# Patient Record
Sex: MALE | Race: WHITE | Employment: FULL TIME | URBAN - METROPOLITAN AREA
[De-identification: names, ages, dates, MRNs, and addresses within clinical notes are randomized per-mention and may not be internally consistent; named-entity substitution may affect disease eponyms.]

---

## 2018-12-01 ENCOUNTER — HOSPITAL ENCOUNTER (OUTPATIENT)
Dept: CT IMAGING | Age: 53
Discharge: HOME | End: 2018-12-01
Payer: COMMERCIAL

## 2018-12-01 DIAGNOSIS — Q45.3 ECTOPIC PANCREATIC TISSUE IN STOMACH: ICD-10-CM

## 2018-12-01 PROCEDURE — 74170 CT ABD WO CNTRST FLWD CNTRST: CPT

## 2018-12-01 PROCEDURE — 6360000004 HC RX CONTRAST MEDICATION: Performed by: INTERNAL MEDICINE

## 2018-12-01 RX ADMIN — IOPAMIDOL 75 ML: 755 INJECTION, SOLUTION INTRAVENOUS at 11:00

## 2018-12-12 ENCOUNTER — HOSPITAL ENCOUNTER (OUTPATIENT)
Dept: NON INVASIVE DIAGNOSTICS | Age: 53
Discharge: HOME | End: 2018-12-12
Payer: COMMERCIAL

## 2018-12-12 ENCOUNTER — HOSPITAL ENCOUNTER (OUTPATIENT)
Dept: VASCULAR LAB | Age: 53
Discharge: HOME | End: 2018-12-12
Payer: COMMERCIAL

## 2018-12-12 DIAGNOSIS — I65.29 STENOSIS OF CAROTID ARTERY, UNSPECIFIED LATERALITY: Primary | ICD-10-CM

## 2018-12-12 LAB
LEFT VENTRICULAR EJECTION FRACTION HIGH VALUE: 60 %
LEFT VENTRICULAR EJECTION FRACTION MODE: NORMAL
LV EF: 60 %
LVEF MODALITY: NORMAL

## 2018-12-12 PROCEDURE — 93306 TTE W/DOPPLER COMPLETE: CPT

## 2018-12-12 PROCEDURE — 93880 EXTRACRANIAL BILAT STUDY: CPT

## 2021-03-30 ENCOUNTER — IMMUNIZATION (OUTPATIENT)
Dept: FAMILY MEDICINE CLINIC | Age: 56
End: 2021-03-30
Payer: COMMERCIAL

## 2021-03-30 PROCEDURE — 91300 COVID-19, PFIZER VACCINE 30MCG/0.3ML DOSE: CPT | Performed by: NURSE PRACTITIONER

## 2021-03-30 PROCEDURE — 0001A COVID-19, PFIZER VACCINE 30MCG/0.3ML DOSE: CPT | Performed by: NURSE PRACTITIONER

## 2021-04-26 ENCOUNTER — TELEPHONE (OUTPATIENT)
Dept: FAMILY MEDICINE CLINIC | Age: 56
End: 2021-04-26

## 2021-05-06 ENCOUNTER — TELEPHONE (OUTPATIENT)
Dept: FAMILY MEDICINE CLINIC | Age: 56
End: 2021-05-06

## 2021-05-06 NOTE — TELEPHONE ENCOUNTER
Patient got vaccine somewhere else yesterday, needs no appointment    ----- Message from Joesph Lim sent at 2021  1:26 PM EDT -----  Regardinnd vaccine  Patient canceled his 2nd vaccine , the location left a  stating he can get scheduled for May 1st . [facility] cant reschedule 2nd vaccines . Please schedule patient and call [phone].

## 2021-10-25 ENCOUNTER — TELEPHONE (OUTPATIENT)
Dept: CARDIOLOGY CLINIC | Age: 56
End: 2021-10-25

## 2021-10-25 NOTE — TELEPHONE ENCOUNTER
Spoke with Eulogio Aburto. She stated patient was referred to us in 2018. Patient had echo in 2018 but was never seen in office. Recent EKG in her office shows incomplete RBBB. Patient has no cardiac symptoms. He is having a right meniscus repair on 11/4/21. She would for [facility] to review EKG and call her back. EKG being faxed to us.

## 2022-03-03 ENCOUNTER — TELEPHONE (OUTPATIENT)
Dept: CARDIOLOGY CLINIC | Age: 57
End: 2022-03-03

## 2022-03-03 NOTE — TELEPHONE ENCOUNTER
Called the pt to get him scheduled as [employer] with DCE, pt stated he was at work and wants a call back after 3 pm,   Time and Date for the pt will be 3/24 at 9 am with GERARDO. Paper work is on [place].       Pls advise thank you

## 2022-03-21 NOTE — PROGRESS NOTES
[facility]  H+P  Consult  OP Visit  FU Visit   CC HX HPI   GEN  Doing well. No new concerns. AS  Ø CP, SOB. HTN  Ambulatory BP in good range. Ø HA/dizziness. CHOL  Last lipid reviewed. On max dose/tolerated statin. MED  Compliant with CV meds. Ø reported SA. HISTORY/ALLERGY/ROS   MEDHx  has a past medical history of Hypertension. SURGHx  has a past surgical history that includes knee surgery (Right). SOCHx  reports that he has been smoking cigarettes. He has been smoking about 1.00 pack per day. He has never used smokeless tobacco. He reports that he does not use drugs. FAMHx family history is not on file. ALLERG Patient has no known allergies. ROS Full ROS obtained and negative except as mentioned in HPI   MEDICATIONS   Current Outpatient Medications   Medication Sig Dispense Refill    aspirin 81 MG EC tablet Take 81 mg by mouth daily      lisinopril (PRINIVIL;ZESTRIL) 20 MG tablet Take 20 mg by mouth daily      pitavastatin (LIVALO) 4 MG TABS tablet Take 4 mg by mouth daily      omeprazole (PRILOSEC) 40 MG delayed release capsule Take 40 mg by mouth daily      tadalafil (CIALIS) 5 MG tablet Take 5 mg by mouth daily      bisoprolol-hydroCHLOROthiazide (ZIAC) 10-6.25 MG per tablet Take 1 tablet by mouth daily      ezetimibe (ZETIA) 10 MG tablet Take 10 mg by mouth daily      colesevelam (WELCHOL) 625 MG tablet TAKE 3 TABLETS BY MOUTH TWICE DAILY FOR 90 DAYS       No current facility-administered medications for this visit.       PHYSICAL EXAM   Vitals Vitals:    03/24/22 0900   BP: 138/82   Pulse: 64   SpO2: 96%      Gen Alert, coop, no distress Heart  Rrr, 2/6   Head NC, AT, no abnorm Abd  Soft, NT, +BS, no mass, no OM   Eyes PER, conj/corn clear Ext  Ext nl, AT, no C/C/E   Nose Nares nl, no drain, NT Pulse 2+ and symmetric   Throat Lips, mucosa, tongue nl Skin Col/text/turg nl, no vis rash/les   Neck S/S, TM, NT, no bruit/JVD Psych Nl mood and affect   Lung CTA-B, unlabored, no DTP Lymph   No cervical or axillary LA   Ch wall NT, no deform Neuro  Nl gross M/S exam      CODING   SCI (81788) - AS, Asc AA  30-39 minutes preparing to see pt including review hx, tests, consults, perf exam, , educating pt, fam, caregiver, ordering meds/tests/procedures, referring and comm with pcps and other consultants, documenting info in EMR, interpreting results and communicating to fam and coordination of pt care. ASSESSMENT AND PLAN   *AS    Date EF Detail   Sx     No concerning   DATA   Most recent TTE, [facility] reviewed personally   TTE 12/18  3/22 60%  55% Mild AS MG 17, aortic root 3.9 cm, ascending aorta 4.7  Mod AS MG 20, ascending aorta 4.0   Plan     AS moderate by echo  Repeat echo 1 year, sooner with symptom change   *Ascending aortic aneurysm  Status stable  CTC none  TTE As above  Repair Sx, rupture, ED>5.5, AV surgery >4.5  Surv 3.5-4.5: CT Q12m /// 4.5-5.4: CT Q6m  Plan Aggressive BP control, education regarding s/s of complications, interval imaging as above   Noncon CT chest  *COMPLIANCE  Status Compliant  Plan Discussed importance of compliance with meds/diet/salt/exercise; avoid tob/alc/drugs; patient verbalized understanding  *FOLLOWUP  12 months if CT ok    [address] Radha Smith, am scribing for and in the presence of Karlene Reich MD.   SignedRadha 03/21/22 1:20 PM   Provider Carol Ann Dia is working as a scribe for and in the presence of me (Karlene Reich MD). Working as a scribe, Radha Oewns may have prepopulated components of this note with my historical  intellectual property under my direct supervision. Any additions to this intellectual property were performed in my presence and at my direction.   Furthermore, the content and accuracy of this note have been reviewed by me Karlene Reich MD).  3/24/2022 7:18 AM

## 2022-03-23 ENCOUNTER — HOSPITAL ENCOUNTER (OUTPATIENT)
Dept: NON INVASIVE DIAGNOSTICS | Age: 57
Discharge: HOME | End: 2022-03-23
Payer: COMMERCIAL

## 2022-03-23 DIAGNOSIS — I35.0 NODULAR CALCIFIC AORTIC VALVE STENOSIS: ICD-10-CM

## 2022-03-23 DIAGNOSIS — I10 BENIGN HYPERTENSION: ICD-10-CM

## 2022-03-23 DIAGNOSIS — I51.7 CARDIOMEGALY: ICD-10-CM

## 2022-03-23 LAB
LV EF: 55 %
LVEF MODALITY: NORMAL

## 2022-03-23 PROCEDURE — 93306 TTE W/DOPPLER COMPLETE: CPT

## 2022-03-24 ENCOUNTER — OFFICE VISIT (OUTPATIENT)
Dept: CARDIOLOGY CLINIC | Age: 57
End: 2022-03-24
Payer: COMMERCIAL

## 2022-03-24 VITALS
HEIGHT: 69 IN | DIASTOLIC BLOOD PRESSURE: 82 MMHG | BODY MASS INDEX: 39.31 KG/M2 | OXYGEN SATURATION: 96 % | SYSTOLIC BLOOD PRESSURE: 138 MMHG | WEIGHT: 265.4 LBS | HEART RATE: 64 BPM

## 2022-03-24 DIAGNOSIS — I35.0 AORTIC VALVE STENOSIS, ETIOLOGY OF CARDIAC VALVE DISEASE UNSPECIFIED: ICD-10-CM

## 2022-03-24 DIAGNOSIS — I77.810 ASCENDING AORTA DILATATION (HCC): Primary | ICD-10-CM

## 2022-03-24 PROCEDURE — 99214 OFFICE O/P EST MOD 30 MIN: CPT | Performed by: INTERNAL MEDICINE

## 2022-03-24 RX ORDER — BISOPROLOL FUMARATE AND HYDROCHLOROTHIAZIDE 10; 6.25 MG/1; MG/1
1 TABLET ORAL DAILY
COMMUNITY
Start: 2021-05-11

## 2022-03-24 RX ORDER — ASPIRIN 81 MG/1
81 TABLET ORAL DAILY
COMMUNITY

## 2022-03-24 RX ORDER — TADALAFIL 5 MG/1
5 TABLET ORAL DAILY
COMMUNITY

## 2022-03-24 RX ORDER — PITAVASTATIN CALCIUM 4.18 MG/1
4 TABLET, FILM COATED ORAL DAILY
COMMUNITY
Start: 2021-05-11

## 2022-03-24 RX ORDER — EZETIMIBE 10 MG/1
10 TABLET ORAL DAILY
COMMUNITY
Start: 2021-05-11

## 2022-03-24 RX ORDER — OMEPRAZOLE 40 MG/1
40 CAPSULE, DELAYED RELEASE ORAL DAILY
COMMUNITY
Start: 2021-05-11

## 2022-03-24 RX ORDER — COLESEVELAM 180 1/1
TABLET ORAL
COMMUNITY
Start: 2021-06-07

## 2022-03-24 RX ORDER — LISINOPRIL 20 MG/1
20 TABLET ORAL DAILY
COMMUNITY

## 2022-04-06 ENCOUNTER — HOSPITAL ENCOUNTER (OUTPATIENT)
Dept: CT IMAGING | Age: 57
Discharge: HOME | End: 2022-04-06
Payer: COMMERCIAL

## 2022-04-06 DIAGNOSIS — I77.810 ASCENDING AORTA DILATATION (HCC): ICD-10-CM

## 2022-04-06 PROCEDURE — 71250 CT THORAX DX C-: CPT

## 2022-04-07 DIAGNOSIS — I77.810 ASCENDING AORTA DILATATION (HCC): Primary | ICD-10-CM

## 2022-04-15 ENCOUNTER — CLINICAL DOCUMENTATION (OUTPATIENT)
Dept: OTHER | Age: 57
End: 2022-04-15

## 2022-04-28 ENCOUNTER — OFFICE VISIT (OUTPATIENT)
Dept: CARDIOTHORACIC SURGERY | Age: 57
End: 2022-04-28
Payer: COMMERCIAL

## 2022-04-28 VITALS
HEIGHT: 69 IN | TEMPERATURE: 98.1 F | DIASTOLIC BLOOD PRESSURE: 72 MMHG | WEIGHT: 260.4 LBS | BODY MASS INDEX: 38.57 KG/M2 | HEART RATE: 62 BPM | OXYGEN SATURATION: 98 % | SYSTOLIC BLOOD PRESSURE: 110 MMHG

## 2022-04-28 DIAGNOSIS — I71.21 ASCENDING AORTIC ANEURYSM: Primary | ICD-10-CM

## 2022-04-28 DIAGNOSIS — I35.0 NONRHEUMATIC AORTIC VALVE STENOSIS: ICD-10-CM

## 2022-04-28 PROCEDURE — 99204 OFFICE O/P NEW MOD 45 MIN: CPT | Performed by: THORACIC SURGERY (CARDIOTHORACIC VASCULAR SURGERY)

## 2022-04-28 NOTE — PROGRESS NOTES
Department of Cardiovascular & Thoracic Surgery  History and Physical / Consultation          PCP: Rebekah Schneider DO    Referring Physician: Dr. Rochelle Archer    DIAGNOSIS:  Ascending Aortic Aneurysm     CHIEF COMPLAINT:  5.2 cm Ascending Aortic Aneurysm on CT scan    History Obtained From:  patient, electronic medical record    HISTORY OF PRESENT ILLNESS:      The patient is a 61 y.o. male with a hx of Moderate Aortic Stenosis, HTN, HLD, pancreatitis, fatty liver, and GERD who is referred for an ascending aortic aneurysm measuring 5.2cm in size seen on a CT Chest W/O contrast on 4/6/22. Patient also had an echo preformed on 3/23/22 showing mild to moderate aortic stenosis. Patient presents today denying any symptoms of chest pain, shortness of breath, palpitations, lightheadedness, or leg swelling. Patient has a 60 pack year history of smoking and is currently smoking 1 pack a day.       Past Medical History:        Diagnosis Date    Fatty liver     Hyperlipidemia     Hypertension     Pancreatitis        Past Surgical History:        Procedure Laterality Date    KNEE SURGERY Right 11/2021    meniscus repair       Medications:   Current Outpatient Medications   Medication Sig Dispense Refill    Cholecalciferol (VITAMIN D3) 125 MCG (5000 UT) TABS Take 5,000 Units by mouth      aspirin 81 MG EC tablet Take 81 mg by mouth daily      lisinopril (PRINIVIL;ZESTRIL) 20 MG tablet Take 20 mg by mouth daily      pitavastatin (LIVALO) 4 MG TABS tablet Take 4 mg by mouth daily      omeprazole (PRILOSEC) 40 MG delayed release capsule Take 40 mg by mouth daily      tadalafil (CIALIS) 5 MG tablet Take 5 mg by mouth daily      bisoprolol-hydroCHLOROthiazide (ZIAC) 10-6.25 MG per tablet Take 1 tablet by mouth daily      ezetimibe (ZETIA) 10 MG tablet Take 10 mg by mouth daily      colesevelam (WELCHOL) 625 MG tablet TAKE 3 TABLETS BY MOUTH TWICE DAILY FOR 90 DAYS       No current facility-administered medications for this visit. Allergies:  Patient has no known allergies. Social History:    TOBACCO: Reports smoking for 45 years up to 2 packs a day. Patient has decreased his smoking to 1 pack per day. Estimated 60 pack year smoking history. ETOH:  Currently drinks 12-15 beers on saturdays. Patient reports having previously been drinking 12-15 beers everyday but decreased his amount after having pancreatitis. DRUGS:   reports no history of drug use. LIFESTYLE: Active    MARITAL STATUS:    OCCUPATION:  Employed at a [employer]     Family History:        Problem Relation Age of Onset    Heart Attack Father        REVIEW OF SYSTEMS:    Constitutional:  No night sweats, headaches, weight loss. Eyes:  No glaucoma, cataracts. ENMT:  No nosebleeds, deviated septum. Cardiac:  No arrhythmias. Vascular:  No claudication, varicosities. + HTN, aneurysm  GI:  No PUD, + heartburn. :  No kidney stones, frequent UTIs  Musculoskeletal:  No arthritis, gout. Respiratory:  No SOB, emphysema, asthma. Integumentary:  No dermatitis, itching, rash. Neurological:  No stroke, TIAs, seizures. Psychiatric:  No depression, anxiety. Endocrine: No diabetes, thyroid issues. Hematologic:  No bleeding, easy bruising. Immunologic:  No known cancer, steroid therapies.     PHYSICAL EXAM:    VITALS:  /72 (Site: Right Upper Arm, Position: Sitting, Cuff Size: Medium Adult)   Pulse 62   Temp 98.1 °F (36.7 °C) (Temporal)   Ht 5' 9\" (1.753 m)   Wt 260 lb 6.4 oz (118.1 kg)   SpO2 98%   BMI 38.45 kg/m²     Eyes:  lids and lashes normal, pupils equal and round, extra ocular muscles intact, sclera clear, conjunctiva normal    Head/ENT:  Normocephalic, atraumatic,  residual dentition, normal gums, & palate, oropharynx without erythema or exudates    Neck:  supple, symmetrical, trachea midline, no lymphadenopathy, no jugular venous distension, no carotid bruits and MASSES:  no masses    Lungs:  no increased work of breathing, good air exchange, no retractions and clear to auscultation, no palpable / percussible abnormalities    Cardiovascular:  regular rate and rhythm, S1, S2 normal, no murmur, click, rub or gallop    Pulses:  Right dorsalis pedis 2, Left dorsalis pedis 2, Right posterior tibial 2, Left Posterior tibial 2, Right radial 2, and Left radial 2    Abdomen:  Soft, normal bowel sounds, non-tender, no hepatosplenomegaly, aorta likely normal and bruits absent    Musculoskeletal:  Back is straight and non-tender,  No CVAT, full ROM of upper and lower extremities. Extremities:   No clubbing, or cyanosis, or edema     Skin: warm and normal turgor, no ulcers, infections, or rashes, no rashes    Neurological: awake, alert and oriented x 3, motor 5/5 bilateral upper and lower extremities, sensation grossly intact    Psychiatric: Mood and affect appear appropriate    DATA:    CT Chest W/O Contrast: 4/6/22  Impression:  1. No acute intrapulmonary findings. 2. 5.2 cm fusiform aneurysm of the ascending aorta, without evidence of leak  or rupture. ECHO: 2/23/22   Summary   Normal left ventricle size, and systolic function with an estimated ejection   fraction of 55%. Diastolic filling parameters suggests grade I diastolic dysfunction . There is mild concentric left ventricular hypertrophy. Mild to moderate aortic stenosis with a peak velocity of 3.1m/s and a mean   pressure gradient of 20mmHg. MACRINA 1.78 cm2   Calcification of the anterior mitral valve leaflet. ASSESSMENT AND PLAN:    Mr. Cristela Dailey is a 61year old male being seen in the office today for an ascending aortic aneurysm measuring 5.2cm in size seen on a CT Chest W/O contrast on 4/6/22 and mild to moderate aortic stenosis seen on an echo on 3/23/22. The patient's ascending aortic aneurysm does not meet criteria for surgical intervention until it is 5.5cm in size.   His aortic stenosis also does not meet criteria for surgical intervention until it becomes severe. This was discussed with the patient and he will need to have a surveillance CT Chest w/o contrast in 6 months (approximately on 10/6/22) to continue to monitor his aneurysm. If aneurysm reaches or exceeds 5.5cm or aortic stenosis becomes severe, patient can be seen again in the office to discuss surgical repair. Discussed assessment and plan with Dr. Lizabeth Lozano. Patient is still currently smoking 1 pack of cigarettes a day and drinking 12-15 beers a week. Smoking and alcohol cessation counseling was given by Dr. Lizabeth Lozano for 20 minutes. All questions answered at this time.        Electronically signed by Elisabeth Grande PA-C on 4/28/2022 at 3:08 PM

## 2022-05-25 ENCOUNTER — TELEPHONE (OUTPATIENT)
Dept: CARDIOLOGY CLINIC | Age: 57
End: 2022-05-25

## 2022-05-25 NOTE — TELEPHONE ENCOUNTER
Josefa Harding from [facility] stating that Laith Colbert NP want to know if it ok for patient to continue taking Paul Harness can be reached at [phone]

## 2022-05-26 ENCOUNTER — TELEPHONE (OUTPATIENT)
Dept: ORTHOPEDIC SURGERY | Age: 57
End: 2022-05-26

## 2022-05-26 NOTE — TELEPHONE ENCOUNTER
Per DCE, ok for the patient to take viagra I called DEPARTMENT [facility] office and informed them.

## 2022-05-26 NOTE — TELEPHONE ENCOUNTER
Attempted to contact patient to inform them about the [facility] [address] joint pain program.    To provide optimal care, [facility], in collaboration with our Primary Care Providers, are excited to update our [facility] ([facility]) patients on our joint pain program.    In the past have you received injections in your knees? Or are you currently experiencing knee pain that is impacting your daily activities or quality of life? For more information about what [facility] [address] can offer to you or to set up an appointment with a joint pain specialist, please call us back at [phone].

## 2022-06-02 NOTE — TELEPHONE ENCOUNTER
Dilia Israel NP needs DCE to put it in writing that its ok for the patient to take Viagra. .  Can be faxed to [phone]

## 2022-10-05 ENCOUNTER — TELEPHONE (OUTPATIENT)
Dept: CARDIOTHORACIC SURGERY | Age: 57
End: 2022-10-05

## 2022-10-05 NOTE — TELEPHONE ENCOUNTER
Spoke with patient regarding schedule of CT chest without contrast on 10/29/2022 at 10:00 am with arrival time of 9:30 am at [facility] for assessment of his ascending aortic aneurysm. Patient has been instructed to avoid metal items such as buttons, snaps and jewelry.

## 2022-10-24 ENCOUNTER — HOSPITAL ENCOUNTER (OUTPATIENT)
Age: 57
Discharge: HOME | End: 2022-10-24
Payer: COMMERCIAL

## 2022-10-24 LAB
A/G RATIO: 1.8 (ref 1.1–2.2)
ALBUMIN SERPL-MCNC: 4.4 G/DL (ref 3.4–5)
ALP BLD-CCNC: 38 U/L (ref 40–129)
ALT SERPL-CCNC: 39 U/L (ref 10–40)
ANION GAP SERPL CALCULATED.3IONS-SCNC: 9 MMOL/L (ref 3–16)
APTT: 28.6 SEC (ref 23–34.3)
AST SERPL-CCNC: 30 U/L (ref 15–37)
BASOPHILS ABSOLUTE: 0.1 K/UL (ref 0–0.2)
BASOPHILS RELATIVE PERCENT: 0.9 %
BILIRUB SERPL-MCNC: 0.6 MG/DL (ref 0–1)
BUN BLDV-MCNC: 16 MG/DL (ref 7–20)
CALCIUM SERPL-MCNC: 10.5 MG/DL (ref 8.3–10.6)
CHLORIDE BLD-SCNC: 101 MMOL/L (ref 99–110)
CO2: 28 MMOL/L (ref 21–32)
CREAT SERPL-MCNC: 1 MG/DL (ref 0.9–1.3)
D DIMER: <0.27 UG/ML FEU (ref 0–0.6)
EOSINOPHILS ABSOLUTE: 0.2 K/UL (ref 0–0.6)
EOSINOPHILS RELATIVE PERCENT: 1.7 %
GFR SERPL CREATININE-BSD FRML MDRD: >60 ML/MIN/{1.73_M2}
GLUCOSE BLD-MCNC: 112 MG/DL (ref 70–99)
HCT VFR BLD CALC: 48.6 % (ref 40.5–52.5)
HEMOGLOBIN: 16.3 G/DL (ref 13.5–17.5)
INR BLD: 0.97 (ref 0.87–1.14)
LACTATE DEHYDROGENASE: 170 U/L (ref 100–190)
LYMPHOCYTES ABSOLUTE: 2.6 K/UL (ref 1–5.1)
LYMPHOCYTES RELATIVE PERCENT: 28.1 %
MCH RBC QN AUTO: 31.3 PG (ref 26–34)
MCHC RBC AUTO-ENTMCNC: 33.6 G/DL (ref 31–36)
MCV RBC AUTO: 93 FL (ref 80–100)
MONOCYTES ABSOLUTE: 0.5 K/UL (ref 0–1.3)
MONOCYTES RELATIVE PERCENT: 5.3 %
NEUTROPHILS ABSOLUTE: 5.8 K/UL (ref 1.7–7.7)
NEUTROPHILS RELATIVE PERCENT: 64 %
PDW BLD-RTO: 13.9 % (ref 12.4–15.4)
PLATELET # BLD: 205 K/UL (ref 135–450)
PMV BLD AUTO: 8.6 FL (ref 5–10.5)
POTASSIUM SERPL-SCNC: 4.5 MMOL/L (ref 3.5–5.1)
PROTHROMBIN TIME: 12.8 SEC (ref 11.7–14.5)
RBC # BLD: 5.23 M/UL (ref 4.2–5.9)
SODIUM BLD-SCNC: 138 MMOL/L (ref 136–145)
TOTAL CK: 270 U/L (ref 39–308)
TOTAL PROTEIN: 6.8 G/DL (ref 6.4–8.2)
TROPONIN: <0.01 NG/ML
URIC ACID, SERUM: 6.2 MG/DL (ref 3.5–7.2)
WBC # BLD: 9.1 K/UL (ref 4–11)

## 2022-10-24 PROCEDURE — 85379 FIBRIN DEGRADATION QUANT: CPT

## 2022-10-24 PROCEDURE — 85610 PROTHROMBIN TIME: CPT

## 2022-10-24 PROCEDURE — 36415 COLL VENOUS BLD VENIPUNCTURE: CPT

## 2022-10-24 PROCEDURE — 82085 ASSAY OF ALDOLASE: CPT

## 2022-10-24 PROCEDURE — 85730 THROMBOPLASTIN TIME PARTIAL: CPT

## 2022-10-24 PROCEDURE — 83615 LACTATE (LD) (LDH) ENZYME: CPT

## 2022-10-24 PROCEDURE — 85025 COMPLETE CBC W/AUTO DIFF WBC: CPT

## 2022-10-24 PROCEDURE — 84550 ASSAY OF BLOOD/URIC ACID: CPT

## 2022-10-24 PROCEDURE — 80053 COMPREHEN METABOLIC PANEL: CPT

## 2022-10-24 PROCEDURE — 84484 ASSAY OF TROPONIN QUANT: CPT

## 2022-10-24 PROCEDURE — 82550 ASSAY OF CK (CPK): CPT

## 2022-10-26 LAB — ALDOLASE: 6.1 U/L (ref 1.2–7.6)

## 2022-10-29 ENCOUNTER — HOSPITAL ENCOUNTER (OUTPATIENT)
Dept: CT IMAGING | Age: 57
Discharge: HOME | End: 2022-10-29
Payer: COMMERCIAL

## 2022-10-29 DIAGNOSIS — I71.21 ASCENDING AORTIC ANEURYSM: ICD-10-CM

## 2022-10-29 PROCEDURE — 71250 CT THORAX DX C-: CPT

## 2022-11-10 DIAGNOSIS — I71.21 ANEURYSM OF ASCENDING AORTA WITHOUT RUPTURE: Primary | ICD-10-CM

## 2022-11-22 ENCOUNTER — TELEPHONE (OUTPATIENT)
Dept: CARDIOLOGY CLINIC | Age: 57
End: 2022-11-22

## 2022-11-22 NOTE — TELEPHONE ENCOUNTER
Pt states he has an order for both echo and echo with bubble and is asking if he needs both. Please advise.

## 2022-11-23 NOTE — TELEPHONE ENCOUNTER
Called pt to clarify. Pt only needs one echo ordered by dr Luwana Merlin. Gave scheduling number and office number. Pt needs yearly fu appointment with Dr Luwana Merlin scheduled. Echo can be same day.

## 2022-12-29 DIAGNOSIS — I71.21 ANEURYSM OF ASCENDING AORTA WITHOUT RUPTURE: Primary | ICD-10-CM

## 2023-04-05 ENCOUNTER — HOSPITAL ENCOUNTER (OUTPATIENT)
Dept: NON INVASIVE DIAGNOSTICS | Age: 58
Discharge: HOME | End: 2023-04-05
Payer: COMMERCIAL

## 2023-04-05 LAB
LV EF: 63 %
LVEF MODALITY: NORMAL

## 2023-04-05 PROCEDURE — 93306 TTE W/DOPPLER COMPLETE: CPT

## 2023-04-06 NOTE — PROGRESS NOTES
[address]  H+P  Consult  OP Visit  FU Visit   CC/HPI   CC Followup visit for cardiac conditions detailed in assessment and plan below. Intervention NONE   General Doing well. No new concerns. Cardiac Sx -CP, -SOB, -dizziness, -syncope, -edema, -orthopnea, -pnd   HISTORY/ALLERGY/ROS   MEDHx  has a past medical history of Fatty liver, Hyperlipidemia, Hypertension, and Pancreatitis. SURGHx  has a past surgical history that includes knee surgery (Right, 11/2021). SOCHx  reports that he has been smoking cigarettes. He has been smoking an average of 1 pack per day. He has never used smokeless tobacco. He reports current alcohol use. He reports that he does not use drugs. FAMHx family history includes Heart Attack in his father. ALLERG Patient has no known allergies. ROS Full ROS obtained and negative except as mentioned in HPI   MEDICATIONS   Current Outpatient Medications   Medication Sig Dispense Refill    Cholecalciferol (VITAMIN D3) 125 MCG (5000 UT) TABS Take 5,000 Units by mouth      aspirin 81 MG EC tablet Take 81 mg by mouth daily      lisinopril (PRINIVIL;ZESTRIL) 20 MG tablet Take 20 mg by mouth daily      pitavastatin (LIVALO) 4 MG TABS tablet Take 4 mg by mouth daily      omeprazole (PRILOSEC) 40 MG delayed release capsule Take 40 mg by mouth daily      tadalafil (CIALIS) 5 MG tablet Take 5 mg by mouth daily      bisoprolol-hydroCHLOROthiazide (ZIAC) 10-6.25 MG per tablet Take 1 tablet by mouth daily      ezetimibe (ZETIA) 10 MG tablet Take 10 mg by mouth daily      colesevelam (WELCHOL) 625 MG tablet TAKE 3 TABLETS BY MOUTH TWICE DAILY FOR 90 DAYS       No current facility-administered medications for this visit.       PHYSICAL EXAM   Vitals /82   Pulse 63   Ht 5' 9\" (1.753 m)   Wt 265 lb 12.8 oz (120.6 kg)   SpO2 95%   BMI 39.25 kg/m²    Gen Alert, coop, no distress Heart  RRR, 1/6   Head NC, AT, no abnorm Abd  Soft, NT, +BS, no mass, no OM   Eyes PER, conj/corn clear

## 2023-04-07 ENCOUNTER — TELEPHONE (OUTPATIENT)
Dept: CARDIOLOGY CLINIC | Age: 58
End: 2023-04-07

## 2023-04-07 ENCOUNTER — OFFICE VISIT (OUTPATIENT)
Dept: CARDIOLOGY CLINIC | Age: 58
End: 2023-04-07
Payer: COMMERCIAL

## 2023-04-07 VITALS
WEIGHT: 265.8 LBS | BODY MASS INDEX: 39.37 KG/M2 | HEIGHT: 69 IN | HEART RATE: 63 BPM | OXYGEN SATURATION: 95 % | SYSTOLIC BLOOD PRESSURE: 128 MMHG | DIASTOLIC BLOOD PRESSURE: 82 MMHG

## 2023-04-07 DIAGNOSIS — I35.0 AORTIC VALVE STENOSIS, NONRHEUMATIC: Primary | ICD-10-CM

## 2023-04-07 DIAGNOSIS — I77.810 ASCENDING AORTA DILATATION (HCC): ICD-10-CM

## 2023-04-07 PROCEDURE — 99214 OFFICE O/P EST MOD 30 MIN: CPT | Performed by: INTERNAL MEDICINE

## 2023-04-07 RX ORDER — NIACIN 500 MG
500 TABLET ORAL EVERY MORNING
COMMUNITY

## 2023-10-12 ENCOUNTER — HOSPITAL ENCOUNTER (OUTPATIENT)
Dept: NON INVASIVE DIAGNOSTICS | Age: 58
Discharge: HOME | End: 2023-10-12
Payer: COMMERCIAL

## 2023-10-12 ENCOUNTER — HOSPITAL ENCOUNTER (OUTPATIENT)
Dept: CT IMAGING | Age: 58
Discharge: HOME | End: 2023-10-12
Payer: COMMERCIAL

## 2023-10-12 DIAGNOSIS — I35.0 AORTIC VALVE STENOSIS, NONRHEUMATIC: ICD-10-CM

## 2023-10-12 DIAGNOSIS — I77.810 ASCENDING AORTA DILATATION (HCC): ICD-10-CM

## 2023-10-12 PROCEDURE — 93306 TTE W/DOPPLER COMPLETE: CPT

## 2023-10-12 PROCEDURE — 71250 CT THORAX DX C-: CPT

## 2023-10-19 ENCOUNTER — OFFICE VISIT (OUTPATIENT)
Dept: CARDIOLOGY CLINIC | Age: 58
End: 2023-10-19
Payer: COMMERCIAL

## 2023-10-19 VITALS
HEIGHT: 69 IN | HEART RATE: 70 BPM | BODY MASS INDEX: 39.69 KG/M2 | WEIGHT: 268 LBS | DIASTOLIC BLOOD PRESSURE: 82 MMHG | OXYGEN SATURATION: 96 % | SYSTOLIC BLOOD PRESSURE: 120 MMHG

## 2023-10-19 DIAGNOSIS — I77.810 ASCENDING AORTA DILATATION (HCC): ICD-10-CM

## 2023-10-19 DIAGNOSIS — I35.0 AORTIC VALVE STENOSIS, NONRHEUMATIC: Primary | ICD-10-CM

## 2023-10-19 PROCEDURE — 99214 OFFICE O/P EST MOD 30 MIN: CPT | Performed by: INTERNAL MEDICINE

## 2023-10-19 NOTE — PATIENT INSTRUCTIONS
Continue all medications as prescribed    Your CT and ECHO are both stable. There is no significant change. We will repeat both before your appt next year    If you have worsening shortness of breath or fatigue call the office    Maria Teresa Casey is Dr Barbie Carpenter Nurse    Keep trying to quit smoking!

## 2024-06-30 ENCOUNTER — HOSPITAL ENCOUNTER (EMERGENCY)
Age: 59
Discharge: HOME | End: 2024-06-30
Payer: COMMERCIAL

## 2024-06-30 VITALS
HEART RATE: 70 BPM | HEIGHT: 70 IN | SYSTOLIC BLOOD PRESSURE: 136 MMHG | DIASTOLIC BLOOD PRESSURE: 88 MMHG | TEMPERATURE: 98.1 F | RESPIRATION RATE: 18 BRPM | BODY MASS INDEX: 38.65 KG/M2 | OXYGEN SATURATION: 95 % | WEIGHT: 270 LBS

## 2024-06-30 DIAGNOSIS — A69.20 ERYTHEMA MIGRANS (LYME DISEASE): Primary | ICD-10-CM

## 2024-06-30 PROCEDURE — 6370000000 HC RX 637 (ALT 250 FOR IP): Performed by: GENERAL ACUTE CARE HOSPITAL

## 2024-06-30 PROCEDURE — 99283 EMERGENCY DEPT VISIT LOW MDM: CPT

## 2024-06-30 RX ORDER — DOXYCYCLINE HYCLATE 100 MG
100 TABLET ORAL ONCE
Status: COMPLETED | OUTPATIENT
Start: 2024-06-30 | End: 2024-06-30

## 2024-06-30 RX ADMIN — DOXYCYCLINE HYCLATE 100 MG: 100 TABLET, COATED ORAL at 15:10

## 2024-06-30 ASSESSMENT — PAIN DESCRIPTION - LOCATION: LOCATION: BACK

## 2024-06-30 ASSESSMENT — PAIN SCALES - GENERAL: PAINLEVEL_OUTOF10: 6

## 2024-06-30 ASSESSMENT — PAIN DESCRIPTION - ORIENTATION: ORIENTATION: LOWER

## 2024-06-30 ASSESSMENT — PAIN DESCRIPTION - DESCRIPTORS: DESCRIPTORS: ACHING

## 2024-06-30 ASSESSMENT — LIFESTYLE VARIABLES
HOW MANY STANDARD DRINKS CONTAINING ALCOHOL DO YOU HAVE ON A TYPICAL DAY: PATIENT DOES NOT DRINK
HOW OFTEN DO YOU HAVE A DRINK CONTAINING ALCOHOL: NEVER

## 2024-06-30 ASSESSMENT — PAIN - FUNCTIONAL ASSESSMENT: PAIN_FUNCTIONAL_ASSESSMENT: 0-10

## 2024-06-30 NOTE — DISCHARGE INSTRUCTIONS
Take the prescribed doxycycline (given by Urgent Care) as directed until gone.    Follow-up with your primary care provider in 1 week for reevaluation.    Return to nearest ED for high fever, incessant vomiting, severe pain, or any other worsening symptoms

## 2024-06-30 NOTE — ED PROVIDER NOTES
condition requiring my urgent intervention.  I personally saw the patient and independently provided 15 minutes of non-concurrent critical care out of the total shared critical care time provided, excluding separately reportable procedures.         PAST MEDICAL HISTORY      has a past medical history of Fatty liver, Hyperlipidemia, Hypertension, and Pancreatitis.     EMERGENCY DEPARTMENT COURSE and DIFFERENTIAL DIAGNOSIS/MDM:   Vitals:    Vitals:    06/30/24 1438   BP: 136/88   Pulse: 70   Resp: 18   Temp: 98.1 °F (36.7 °C)   TempSrc: Oral   SpO2: 95%   Weight: 122.5 kg (270 lb)   Height: 1.778 m (5' 10\")       Patient was given the following medications:  Medications   doxycycline hyclate (VIBRA-TABS) tablet 100 mg (100 mg Oral Given 6/30/24 1510)             Is this patient to be included in the SEP-1 Core Measure due to severe sepsis or septic shock?   No   Exclusion criteria - the patient is NOT to be included for SEP-1 Core Measure due to:  2+ SIRS criteria are not met    Chronic Conditions affecting care:    has a past medical history of Fatty liver, Hyperlipidemia, Hypertension, and Pancreatitis.    CONSULTS: (Who and What was discussed)  None      Social Determinants Significantly Affecting Health : None    Records Reviewed (External and Source) previous records reviewed in order to gain further information regarding patient's PMH as well as his HPI.  Nursing notes reviewed.    CC/HPI Summary, DDx, ED Course, and Reassessment: This is a 59-year-old male who presents to the emergency department today accompanied by spouse for evaluation of a rash.  Patient states that he noticed the rash yesterday.  Patient states that he was bit by a tick approximately 2 weeks ago on Father's Day weekend while camping.  Patient states that his wife removed the tick.  Patient reports concern for possible Lyme disease.  Patient states that he has otherwise felt like his \"usual self\".  Patient denies headache, dizziness, or

## 2024-06-30 NOTE — ED TRIAGE NOTES
Patient oriented to room and ED throughput process.  Safety measures with ED bed locked in lowest position and call light in reach.  Patient educated on all orders, including any medications.  Patient educated on chief complaint/symptoms. Patient encouraged to ask questions regarding care, medications or treatment plan.  Patient aware of how to reach staff with questions/concerns.     No

## 2024-07-03 ASSESSMENT — ENCOUNTER SYMPTOMS
ABDOMINAL PAIN: 0
COUGH: 0
CHEST TIGHTNESS: 0
BACK PAIN: 1
SORE THROAT: 0
VOICE CHANGE: 0
WHEEZING: 0
NAUSEA: 0
VOMITING: 0
SHORTNESS OF BREATH: 0

## 2025-01-13 ENCOUNTER — APPOINTMENT (OUTPATIENT)
Dept: GENERAL RADIOLOGY | Age: 60
DRG: 216 | End: 2025-01-13
Payer: COMMERCIAL

## 2025-01-13 ENCOUNTER — APPOINTMENT (OUTPATIENT)
Dept: CT IMAGING | Age: 60
DRG: 216 | End: 2025-01-13
Payer: COMMERCIAL

## 2025-01-13 ENCOUNTER — HOSPITAL ENCOUNTER (INPATIENT)
Age: 60
LOS: 9 days | Discharge: HOME HEALTH | DRG: 216 | End: 2025-01-22
Attending: EMERGENCY MEDICINE | Admitting: STUDENT IN AN ORGANIZED HEALTH CARE EDUCATION/TRAINING PROGRAM
Payer: COMMERCIAL

## 2025-01-13 DIAGNOSIS — R07.9 CHEST PAIN, UNSPECIFIED TYPE: Primary | ICD-10-CM

## 2025-01-13 DIAGNOSIS — I35.9 AORTIC VALVE DISEASE: ICD-10-CM

## 2025-01-13 DIAGNOSIS — I71.21 ANEURYSM OF ASCENDING AORTA WITHOUT RUPTURE (HCC): ICD-10-CM

## 2025-01-13 DIAGNOSIS — I25.10 CORONARY ARTERY DISEASE, UNSPECIFIED VESSEL OR LESION TYPE, UNSPECIFIED WHETHER ANGINA PRESENT, UNSPECIFIED WHETHER NATIVE OR TRANSPLANTED HEART: ICD-10-CM

## 2025-01-13 DIAGNOSIS — R07.9 CHEST PAIN: ICD-10-CM

## 2025-01-13 DIAGNOSIS — Z95.2 S/P AVR: ICD-10-CM

## 2025-01-13 DIAGNOSIS — I48.91 ATRIAL FIBRILLATION WITH RAPID VENTRICULAR RESPONSE (HCC): ICD-10-CM

## 2025-01-13 LAB
ALBUMIN SERPL-MCNC: 4.3 G/DL (ref 3.4–5)
ALBUMIN/GLOB SERPL: 1.5 {RATIO} (ref 1.1–2.2)
ALP SERPL-CCNC: 46 U/L (ref 40–129)
ALT SERPL-CCNC: 49 U/L (ref 10–40)
ANION GAP SERPL CALCULATED.3IONS-SCNC: 13 MMOL/L (ref 3–16)
ANTI-XA UNFRAC HEPARIN: 0.28 IU/ML (ref 0.3–0.7)
APTT BLD: 99.6 SEC (ref 22.1–36.4)
AST SERPL-CCNC: 43 U/L (ref 15–37)
BASOPHILS # BLD: 0.1 K/UL (ref 0–0.2)
BASOPHILS NFR BLD: 0.6 %
BILIRUB SERPL-MCNC: 0.4 MG/DL (ref 0–1)
BUN SERPL-MCNC: 19 MG/DL (ref 7–20)
CALCIUM SERPL-MCNC: 10.3 MG/DL (ref 8.3–10.6)
CHLORIDE SERPL-SCNC: 97 MMOL/L (ref 99–110)
CO2 SERPL-SCNC: 24 MMOL/L (ref 21–32)
CREAT SERPL-MCNC: 1.1 MG/DL (ref 0.8–1.3)
DEPRECATED RDW RBC AUTO: 13.3 % (ref 12.4–15.4)
EOSINOPHIL # BLD: 0.2 K/UL (ref 0–0.6)
EOSINOPHIL NFR BLD: 1.6 %
GFR SERPLBLD CREATININE-BSD FMLA CKD-EPI: 76 ML/MIN/{1.73_M2}
GLUCOSE SERPL-MCNC: 157 MG/DL (ref 70–99)
HCT VFR BLD AUTO: 50.1 % (ref 40.5–52.5)
HGB BLD-MCNC: 16.7 G/DL (ref 13.5–17.5)
INR PPP: 1.08 (ref 0.85–1.15)
LYMPHOCYTES # BLD: 3.9 K/UL (ref 1–5.1)
LYMPHOCYTES NFR BLD: 35 %
MAGNESIUM SERPL-MCNC: 1.51 MG/DL (ref 1.8–2.4)
MCH RBC QN AUTO: 31.3 PG (ref 26–34)
MCHC RBC AUTO-ENTMCNC: 33.4 G/DL (ref 31–36)
MCV RBC AUTO: 93.6 FL (ref 80–100)
MONOCYTES # BLD: 0.8 K/UL (ref 0–1.3)
MONOCYTES NFR BLD: 7.1 %
NEUTROPHILS # BLD: 6.1 K/UL (ref 1.7–7.7)
NEUTROPHILS NFR BLD: 55.7 %
PLATELET # BLD AUTO: 197 K/UL (ref 135–450)
PMV BLD AUTO: 8.6 FL (ref 5–10.5)
POTASSIUM SERPL-SCNC: 3.9 MMOL/L (ref 3.5–5.1)
PROT SERPL-MCNC: 7.1 G/DL (ref 6.4–8.2)
PROTHROMBIN TIME: 14.2 SEC (ref 11.9–14.9)
RBC # BLD AUTO: 5.35 M/UL (ref 4.2–5.9)
SODIUM SERPL-SCNC: 134 MMOL/L (ref 136–145)
TROPONIN, HIGH SENSITIVITY: 21 NG/L (ref 0–22)
TROPONIN, HIGH SENSITIVITY: 48 NG/L (ref 0–22)
TROPONIN, HIGH SENSITIVITY: 9 NG/L (ref 0–22)
WBC # BLD AUTO: 11 K/UL (ref 4–11)

## 2025-01-13 PROCEDURE — 6360000002 HC RX W HCPCS: Performed by: STUDENT IN AN ORGANIZED HEALTH CARE EDUCATION/TRAINING PROGRAM

## 2025-01-13 PROCEDURE — 85520 HEPARIN ASSAY: CPT

## 2025-01-13 PROCEDURE — 80053 COMPREHEN METABOLIC PANEL: CPT

## 2025-01-13 PROCEDURE — 96365 THER/PROPH/DIAG IV INF INIT: CPT

## 2025-01-13 PROCEDURE — 93005 ELECTROCARDIOGRAM TRACING: CPT | Performed by: EMERGENCY MEDICINE

## 2025-01-13 PROCEDURE — 6360000002 HC RX W HCPCS: Performed by: INTERNAL MEDICINE

## 2025-01-13 PROCEDURE — 84484 ASSAY OF TROPONIN QUANT: CPT

## 2025-01-13 PROCEDURE — 6370000000 HC RX 637 (ALT 250 FOR IP): Performed by: EMERGENCY MEDICINE

## 2025-01-13 PROCEDURE — 99291 CRITICAL CARE FIRST HOUR: CPT

## 2025-01-13 PROCEDURE — 2060000000 HC ICU INTERMEDIATE R&B

## 2025-01-13 PROCEDURE — 2580000003 HC RX 258: Performed by: INTERNAL MEDICINE

## 2025-01-13 PROCEDURE — 83735 ASSAY OF MAGNESIUM: CPT

## 2025-01-13 PROCEDURE — 36415 COLL VENOUS BLD VENIPUNCTURE: CPT

## 2025-01-13 PROCEDURE — 85730 THROMBOPLASTIN TIME PARTIAL: CPT

## 2025-01-13 PROCEDURE — 85025 COMPLETE CBC W/AUTO DIFF WBC: CPT

## 2025-01-13 PROCEDURE — 85610 PROTHROMBIN TIME: CPT

## 2025-01-13 PROCEDURE — 2500000003 HC RX 250 WO HCPCS: Performed by: EMERGENCY MEDICINE

## 2025-01-13 PROCEDURE — 71275 CT ANGIOGRAPHY CHEST: CPT

## 2025-01-13 PROCEDURE — 1200000000 HC SEMI PRIVATE

## 2025-01-13 PROCEDURE — 93005 ELECTROCARDIOGRAM TRACING: CPT | Performed by: INTERNAL MEDICINE

## 2025-01-13 PROCEDURE — 6360000004 HC RX CONTRAST MEDICATION: Performed by: EMERGENCY MEDICINE

## 2025-01-13 RX ORDER — POTASSIUM CHLORIDE 7.45 MG/ML
10 INJECTION INTRAVENOUS PRN
Status: DISCONTINUED | OUTPATIENT
Start: 2025-01-13 | End: 2025-01-16

## 2025-01-13 RX ORDER — PANTOPRAZOLE SODIUM 40 MG/1
40 TABLET, DELAYED RELEASE ORAL
Status: DISCONTINUED | OUTPATIENT
Start: 2025-01-14 | End: 2025-01-16

## 2025-01-13 RX ORDER — ASPIRIN 81 MG/1
81 TABLET, CHEWABLE ORAL DAILY
Status: DISCONTINUED | OUTPATIENT
Start: 2025-01-14 | End: 2025-01-16

## 2025-01-13 RX ORDER — HEPARIN SODIUM 1000 [USP'U]/ML
4000 INJECTION, SOLUTION INTRAVENOUS; SUBCUTANEOUS ONCE
Status: COMPLETED | OUTPATIENT
Start: 2025-01-13 | End: 2025-01-13

## 2025-01-13 RX ORDER — 0.9 % SODIUM CHLORIDE 0.9 %
500 INTRAVENOUS SOLUTION INTRAVENOUS ONCE
Status: COMPLETED | OUTPATIENT
Start: 2025-01-14 | End: 2025-01-14

## 2025-01-13 RX ORDER — ATORVASTATIN CALCIUM 40 MG/1
40 TABLET, FILM COATED ORAL NIGHTLY
Status: DISCONTINUED | OUTPATIENT
Start: 2025-01-13 | End: 2025-01-16

## 2025-01-13 RX ORDER — HEPARIN SODIUM 1000 [USP'U]/ML
2000 INJECTION, SOLUTION INTRAVENOUS; SUBCUTANEOUS PRN
Status: DISCONTINUED | OUTPATIENT
Start: 2025-01-13 | End: 2025-01-13 | Stop reason: SDUPTHER

## 2025-01-13 RX ORDER — DILTIAZEM HYDROCHLORIDE 5 MG/ML
5 INJECTION INTRAVENOUS ONCE
Status: COMPLETED | OUTPATIENT
Start: 2025-01-13 | End: 2025-01-13

## 2025-01-13 RX ORDER — 0.9 % SODIUM CHLORIDE 0.9 %
500 INTRAVENOUS SOLUTION INTRAVENOUS ONCE
Status: COMPLETED | OUTPATIENT
Start: 2025-01-13 | End: 2025-01-13

## 2025-01-13 RX ORDER — DILTIAZEM HCL IN NACL,ISO-OSM 125 MG/125
2.5-15 PLASTIC BAG, INJECTION (ML) INTRAVENOUS CONTINUOUS
Status: DISCONTINUED | OUTPATIENT
Start: 2025-01-13 | End: 2025-01-13 | Stop reason: SDUPTHER

## 2025-01-13 RX ORDER — HEPARIN SODIUM 1000 [USP'U]/ML
2000 INJECTION, SOLUTION INTRAVENOUS; SUBCUTANEOUS PRN
Status: DISCONTINUED | OUTPATIENT
Start: 2025-01-13 | End: 2025-01-16

## 2025-01-13 RX ORDER — LISINOPRIL 10 MG/1
10 TABLET ORAL DAILY
Status: DISCONTINUED | OUTPATIENT
Start: 2025-01-14 | End: 2025-01-14

## 2025-01-13 RX ORDER — POTASSIUM CHLORIDE 1500 MG/1
40 TABLET, EXTENDED RELEASE ORAL PRN
Status: DISCONTINUED | OUTPATIENT
Start: 2025-01-13 | End: 2025-01-16

## 2025-01-13 RX ORDER — HEPARIN SODIUM 10000 [USP'U]/100ML
5-30 INJECTION, SOLUTION INTRAVENOUS CONTINUOUS
Status: DISCONTINUED | OUTPATIENT
Start: 2025-01-13 | End: 2025-01-13 | Stop reason: SDUPTHER

## 2025-01-13 RX ORDER — HYDROMORPHONE HYDROCHLORIDE 1 MG/ML
0.5 INJECTION, SOLUTION INTRAMUSCULAR; INTRAVENOUS; SUBCUTANEOUS EVERY 4 HOURS PRN
Status: DISCONTINUED | OUTPATIENT
Start: 2025-01-13 | End: 2025-01-16

## 2025-01-13 RX ORDER — MAGNESIUM SULFATE IN WATER 40 MG/ML
2000 INJECTION, SOLUTION INTRAVENOUS PRN
Status: DISCONTINUED | OUTPATIENT
Start: 2025-01-13 | End: 2025-01-16

## 2025-01-13 RX ORDER — SODIUM CHLORIDE 9 MG/ML
INJECTION, SOLUTION INTRAVENOUS PRN
Status: DISCONTINUED | OUTPATIENT
Start: 2025-01-13 | End: 2025-01-16

## 2025-01-13 RX ORDER — MAGNESIUM SULFATE IN WATER 40 MG/ML
4000 INJECTION, SOLUTION INTRAVENOUS ONCE
Status: COMPLETED | OUTPATIENT
Start: 2025-01-13 | End: 2025-01-14

## 2025-01-13 RX ORDER — FENOFIBRIC ACID 135 MG/1
135 CAPSULE, DELAYED RELEASE ORAL DAILY
Status: ON HOLD | COMMUNITY
End: 2025-01-22 | Stop reason: HOSPADM

## 2025-01-13 RX ORDER — DILTIAZEM HCL IN NACL,ISO-OSM 125 MG/125
2.5-15 PLASTIC BAG, INJECTION (ML) INTRAVENOUS CONTINUOUS
Status: DISCONTINUED | OUTPATIENT
Start: 2025-01-13 | End: 2025-01-16

## 2025-01-13 RX ORDER — ACETAMINOPHEN 650 MG/1
650 SUPPOSITORY RECTAL EVERY 6 HOURS PRN
Status: DISCONTINUED | OUTPATIENT
Start: 2025-01-13 | End: 2025-01-16

## 2025-01-13 RX ORDER — SODIUM CHLORIDE 0.9 % (FLUSH) 0.9 %
5-40 SYRINGE (ML) INJECTION PRN
Status: DISCONTINUED | OUTPATIENT
Start: 2025-01-13 | End: 2025-01-16

## 2025-01-13 RX ORDER — HEPARIN SODIUM 1000 [USP'U]/ML
4000 INJECTION, SOLUTION INTRAVENOUS; SUBCUTANEOUS ONCE
Status: DISCONTINUED | OUTPATIENT
Start: 2025-01-13 | End: 2025-01-13 | Stop reason: SDUPTHER

## 2025-01-13 RX ORDER — SODIUM CHLORIDE 0.9 % (FLUSH) 0.9 %
5-40 SYRINGE (ML) INJECTION EVERY 12 HOURS SCHEDULED
Status: DISCONTINUED | OUTPATIENT
Start: 2025-01-13 | End: 2025-01-16

## 2025-01-13 RX ORDER — NITROGLYCERIN 0.4 MG/1
0.4 TABLET SUBLINGUAL EVERY 5 MIN PRN
Status: DISCONTINUED | OUTPATIENT
Start: 2025-01-13 | End: 2025-01-13

## 2025-01-13 RX ORDER — POLYETHYLENE GLYCOL 3350 17 G/17G
17 POWDER, FOR SOLUTION ORAL DAILY PRN
Status: DISCONTINUED | OUTPATIENT
Start: 2025-01-13 | End: 2025-01-16

## 2025-01-13 RX ORDER — IOPAMIDOL 755 MG/ML
75 INJECTION, SOLUTION INTRAVASCULAR
Status: COMPLETED | OUTPATIENT
Start: 2025-01-13 | End: 2025-01-13

## 2025-01-13 RX ORDER — HEPARIN SODIUM 1000 [USP'U]/ML
4000 INJECTION, SOLUTION INTRAVENOUS; SUBCUTANEOUS PRN
Status: DISCONTINUED | OUTPATIENT
Start: 2025-01-13 | End: 2025-01-13 | Stop reason: SDUPTHER

## 2025-01-13 RX ORDER — HEPARIN SODIUM 10000 [USP'U]/100ML
5-30 INJECTION, SOLUTION INTRAVENOUS CONTINUOUS
Status: DISCONTINUED | OUTPATIENT
Start: 2025-01-13 | End: 2025-01-16

## 2025-01-13 RX ORDER — IBUPROFEN 800 MG/1
800 TABLET, FILM COATED ORAL EVERY 8 HOURS PRN
Status: ON HOLD | COMMUNITY
End: 2025-01-22 | Stop reason: HOSPADM

## 2025-01-13 RX ORDER — ONDANSETRON 4 MG/1
4 TABLET, ORALLY DISINTEGRATING ORAL EVERY 8 HOURS PRN
Status: DISCONTINUED | OUTPATIENT
Start: 2025-01-13 | End: 2025-01-16

## 2025-01-13 RX ORDER — ACETAMINOPHEN 325 MG/1
650 TABLET ORAL EVERY 6 HOURS PRN
Status: DISCONTINUED | OUTPATIENT
Start: 2025-01-13 | End: 2025-01-16

## 2025-01-13 RX ORDER — ONDANSETRON 2 MG/ML
4 INJECTION INTRAMUSCULAR; INTRAVENOUS EVERY 6 HOURS PRN
Status: DISCONTINUED | OUTPATIENT
Start: 2025-01-13 | End: 2025-01-16

## 2025-01-13 RX ORDER — HEPARIN SODIUM 1000 [USP'U]/ML
4000 INJECTION, SOLUTION INTRAVENOUS; SUBCUTANEOUS PRN
Status: DISCONTINUED | OUTPATIENT
Start: 2025-01-13 | End: 2025-01-16

## 2025-01-13 RX ORDER — NITROGLYCERIN 0.4 MG/1
0.4 TABLET SUBLINGUAL ONCE
Status: COMPLETED | OUTPATIENT
Start: 2025-01-13 | End: 2025-01-13

## 2025-01-13 RX ORDER — DIGOXIN 0.25 MG/ML
500 INJECTION INTRAMUSCULAR; INTRAVENOUS ONCE
Status: COMPLETED | OUTPATIENT
Start: 2025-01-13 | End: 2025-01-13

## 2025-01-13 RX ORDER — DIGOXIN 125 MCG
250 TABLET ORAL DAILY
Status: DISCONTINUED | OUTPATIENT
Start: 2025-01-14 | End: 2025-01-16

## 2025-01-13 RX ADMIN — IOPAMIDOL 75 ML: 755 INJECTION, SOLUTION INTRAVENOUS at 18:12

## 2025-01-13 RX ADMIN — DIGOXIN 500 MCG: 0.25 INJECTION INTRAMUSCULAR; INTRAVENOUS at 20:02

## 2025-01-13 RX ADMIN — HYDROMORPHONE HYDROCHLORIDE 0.5 MG: 1 INJECTION, SOLUTION INTRAMUSCULAR; INTRAVENOUS; SUBCUTANEOUS at 20:47

## 2025-01-13 RX ADMIN — HEPARIN SODIUM 8 UNITS/KG/HR: 10000 INJECTION, SOLUTION INTRAVENOUS at 20:15

## 2025-01-13 RX ADMIN — HEPARIN SODIUM 4000 UNITS: 1000 INJECTION INTRAVENOUS; SUBCUTANEOUS at 20:13

## 2025-01-13 RX ADMIN — NITROGLYCERIN 0.4 MG: 0.4 TABLET SUBLINGUAL at 19:25

## 2025-01-13 RX ADMIN — MAGNESIUM SULFATE HEPTAHYDRATE 4000 MG: 40 INJECTION, SOLUTION INTRAVENOUS at 20:59

## 2025-01-13 RX ADMIN — SODIUM CHLORIDE 500 ML: 9 INJECTION, SOLUTION INTRAVENOUS at 18:55

## 2025-01-13 RX ADMIN — Medication 5 MG/HR: at 18:00

## 2025-01-13 RX ADMIN — DILTIAZEM HYDROCHLORIDE 5 MG: 5 INJECTION, SOLUTION INTRAVENOUS at 17:56

## 2025-01-13 ASSESSMENT — PAIN DESCRIPTION - DESCRIPTORS
DESCRIPTORS: BURNING
DESCRIPTORS: BURNING

## 2025-01-13 ASSESSMENT — PAIN - FUNCTIONAL ASSESSMENT: PAIN_FUNCTIONAL_ASSESSMENT: 0-10

## 2025-01-13 ASSESSMENT — PAIN DESCRIPTION - LOCATION
LOCATION: CHEST

## 2025-01-13 ASSESSMENT — PAIN SCALES - GENERAL
PAINLEVEL_OUTOF10: 8
PAINLEVEL_OUTOF10: 2
PAINLEVEL_OUTOF10: 8
PAINLEVEL_OUTOF10: 8

## 2025-01-13 ASSESSMENT — LIFESTYLE VARIABLES
HOW OFTEN DO YOU HAVE A DRINK CONTAINING ALCOHOL: NEVER
HOW MANY STANDARD DRINKS CONTAINING ALCOHOL DO YOU HAVE ON A TYPICAL DAY: PATIENT DOES NOT DRINK

## 2025-01-14 ENCOUNTER — APPOINTMENT (OUTPATIENT)
Age: 60
DRG: 216 | End: 2025-01-14
Attending: STUDENT IN AN ORGANIZED HEALTH CARE EDUCATION/TRAINING PROGRAM
Payer: COMMERCIAL

## 2025-01-14 PROBLEM — R07.9 CHEST PAIN: Status: ACTIVE | Noted: 2025-01-14

## 2025-01-14 LAB
ANION GAP SERPL CALCULATED.3IONS-SCNC: 9 MMOL/L (ref 3–16)
ANTI-XA UNFRAC HEPARIN: <0.1 IU/ML (ref 0.3–0.7)
ANTI-XA UNFRAC HEPARIN: <0.1 IU/ML (ref 0.3–0.7)
BUN SERPL-MCNC: 18 MG/DL (ref 7–20)
CALCIUM SERPL-MCNC: 9.4 MG/DL (ref 8.3–10.6)
CHLORIDE SERPL-SCNC: 100 MMOL/L (ref 99–110)
CHOLEST SERPL-MCNC: 162 MG/DL (ref 0–199)
CO2 SERPL-SCNC: 23 MMOL/L (ref 21–32)
CREAT SERPL-MCNC: 0.9 MG/DL (ref 0.8–1.3)
DEPRECATED RDW RBC AUTO: 13.6 % (ref 12.4–15.4)
ECHO AO ASC DIAM: 5.3 CM
ECHO AO ASCENDING AORTA INDEX: 2.23 CM/M2
ECHO AO ROOT DIAM: 4.2 CM
ECHO AO ROOT INDEX: 1.76 CM/M2
ECHO AV AREA PEAK VELOCITY: 1.1 CM2
ECHO AV AREA VTI: 1.3 CM2
ECHO AV AREA/BSA PEAK VELOCITY: 0.5 CM2/M2
ECHO AV AREA/BSA VTI: 0.5 CM2/M2
ECHO AV MEAN GRADIENT: 52 MMHG
ECHO AV MEAN VELOCITY: 3.5 M/S
ECHO AV PEAK GRADIENT: 82 MMHG
ECHO AV PEAK VELOCITY: 4.5 M/S
ECHO AV VELOCITY RATIO: 0.24
ECHO AV VTI: 101 CM
ECHO BSA: 2.48 M2
ECHO BSA: 2.48 M2
ECHO LA AREA 2C: 26.8 CM2
ECHO LA AREA 4C: 26.7 CM2
ECHO LA DIAMETER INDEX: 2.14 CM/M2
ECHO LA DIAMETER: 5.1 CM
ECHO LA MAJOR AXIS: 7.2 CM
ECHO LA MINOR AXIS: 7.6 CM
ECHO LA TO AORTIC ROOT RATIO: 1.21
ECHO LA VOL BP: 81 ML (ref 18–58)
ECHO LA VOL MOD A2C: 78 ML (ref 18–58)
ECHO LA VOL MOD A4C: 81 ML (ref 18–58)
ECHO LA VOL/BSA BIPLANE: 34 ML/M2 (ref 16–34)
ECHO LA VOLUME INDEX MOD A2C: 33 ML/M2 (ref 16–34)
ECHO LA VOLUME INDEX MOD A4C: 34 ML/M2 (ref 16–34)
ECHO LV E' LATERAL VELOCITY: 7.36 CM/S
ECHO LV E' SEPTAL VELOCITY: 6.57 CM/S
ECHO LV EDV A2C: 82 ML
ECHO LV EDV A4C: 102 ML
ECHO LV EDV INDEX A4C: 43 ML/M2
ECHO LV EDV NDEX A2C: 34 ML/M2
ECHO LV EJECTION FRACTION A2C: 76 %
ECHO LV EJECTION FRACTION A4C: 81 %
ECHO LV EJECTION FRACTION BIPLANE: 78 % (ref 55–100)
ECHO LV ESV A2C: 20 ML
ECHO LV ESV A4C: 19 ML
ECHO LV ESV INDEX A2C: 8 ML/M2
ECHO LV ESV INDEX A4C: 8 ML/M2
ECHO LV FRACTIONAL SHORTENING: 26 % (ref 28–44)
ECHO LV INTERNAL DIMENSION DIASTOLE INDEX: 1.93 CM/M2
ECHO LV INTERNAL DIMENSION DIASTOLIC: 4.6 CM (ref 4.2–5.9)
ECHO LV INTERNAL DIMENSION SYSTOLIC INDEX: 1.43 CM/M2
ECHO LV INTERNAL DIMENSION SYSTOLIC: 3.4 CM
ECHO LV IVSD: 1.5 CM (ref 0.6–1)
ECHO LV MASS 2D: 284.8 G (ref 88–224)
ECHO LV MASS INDEX 2D: 119.7 G/M2 (ref 49–115)
ECHO LV POSTERIOR WALL DIASTOLIC: 1.5 CM (ref 0.6–1)
ECHO LV RELATIVE WALL THICKNESS RATIO: 0.65
ECHO LVOT AREA: 4.5 CM2
ECHO LVOT AV VTI INDEX: 0.28
ECHO LVOT DIAM: 2.4 CM
ECHO LVOT MEAN GRADIENT: 4 MMHG
ECHO LVOT PEAK GRADIENT: 5 MMHG
ECHO LVOT PEAK VELOCITY: 1.1 M/S
ECHO LVOT STROKE VOLUME INDEX: 53.8 ML/M2
ECHO LVOT SV: 128 ML
ECHO LVOT VTI: 28.3 CM
ECHO MV A VELOCITY: 0.65 M/S
ECHO MV AREA VTI: 4.5 CM2
ECHO MV E VELOCITY: 0.77 M/S
ECHO MV E/A RATIO: 1.18
ECHO MV E/E' LATERAL: 10.46
ECHO MV E/E' RATIO (AVERAGED): 11.09
ECHO MV E/E' SEPTAL: 11.72
ECHO MV LVOT VTI INDEX: 1.01
ECHO MV MAX VELOCITY: 0.9 M/S
ECHO MV MEAN GRADIENT: 1 MMHG
ECHO MV MEAN VELOCITY: 0.5 M/S
ECHO MV PEAK GRADIENT: 3 MMHG
ECHO MV VTI: 28.6 CM
ECHO PV MAX VELOCITY: 1.1 M/S
ECHO PV PEAK GRADIENT: 5 MMHG
ECHO RA AREA 4C: 12.8 CM2
ECHO RA END SYSTOLIC VOLUME APICAL 4 CHAMBER INDEX BSA: 8 ML/M2
ECHO RA VOLUME: 20 ML
ECHO RV BASAL DIMENSION: 2.5 CM
ECHO RV FREE WALL PEAK S': 11.4 CM/S
ECHO RV LONGITUDINAL DIMENSION: 8.1 CM
ECHO RV MID DIMENSION: 2 CM
ECHO RV TAPSE: 2.1 CM (ref 1.7–?)
EKG ATRIAL RATE: 86 BPM
EKG DIAGNOSIS: NORMAL
EKG DIAGNOSIS: NORMAL
EKG Q-T INTERVAL: 302 MS
EKG Q-T INTERVAL: 344 MS
EKG QRS DURATION: 102 MS
EKG QRS DURATION: 98 MS
EKG QTC CALCULATION (BAZETT): 497 MS
EKG QTC CALCULATION (BAZETT): 513 MS
EKG R AXIS: -27 DEGREES
EKG R AXIS: 263 DEGREES
EKG T AXIS: 16 DEGREES
EKG T AXIS: 227 DEGREES
EKG VENTRICULAR RATE: 134 BPM
EKG VENTRICULAR RATE: 163 BPM
EST. AVERAGE GLUCOSE BLD GHB EST-MCNC: 142.7 MG/DL
GFR SERPLBLD CREATININE-BSD FMLA CKD-EPI: >90 ML/MIN/{1.73_M2}
GLUCOSE SERPL-MCNC: 124 MG/DL (ref 70–99)
HBA1C MFR BLD: 6.6 %
HCT VFR BLD AUTO: 46.4 % (ref 40.5–52.5)
HDLC SERPL-MCNC: 26 MG/DL (ref 40–60)
HGB BLD-MCNC: 15.5 G/DL (ref 13.5–17.5)
INR PPP: 0.97 (ref 0.85–1.15)
LDLC SERPL CALC-MCNC: 104 MG/DL
MAGNESIUM SERPL-MCNC: 2.36 MG/DL (ref 1.8–2.4)
MCH RBC QN AUTO: 31.4 PG (ref 26–34)
MCHC RBC AUTO-ENTMCNC: 33.4 G/DL (ref 31–36)
MCV RBC AUTO: 94.1 FL (ref 80–100)
NT-PROBNP SERPL-MCNC: 319 PG/ML (ref 0–124)
PHOSPHATE SERPL-MCNC: 3.5 MG/DL (ref 2.5–4.9)
PLATELET # BLD AUTO: 181 K/UL (ref 135–450)
PMV BLD AUTO: 8.6 FL (ref 5–10.5)
POTASSIUM SERPL-SCNC: 4 MMOL/L (ref 3.5–5.1)
PROTHROMBIN TIME: 13.1 SEC (ref 11.9–14.9)
RBC # BLD AUTO: 4.93 M/UL (ref 4.2–5.9)
SODIUM SERPL-SCNC: 132 MMOL/L (ref 136–145)
TRIGL SERPL-MCNC: 158 MG/DL (ref 0–150)
TROPONIN, HIGH SENSITIVITY: 89 NG/L (ref 0–22)
TSH SERPL DL<=0.005 MIU/L-ACNC: 0.95 UIU/ML (ref 0.27–4.2)
VLDLC SERPL CALC-MCNC: 32 MG/DL
WBC # BLD AUTO: 11.7 K/UL (ref 4–11)

## 2025-01-14 PROCEDURE — 6360000004 HC RX CONTRAST MEDICATION: Performed by: INTERNAL MEDICINE

## 2025-01-14 PROCEDURE — 2709999900 HC NON-CHARGEABLE SUPPLY: Performed by: INTERNAL MEDICINE

## 2025-01-14 PROCEDURE — 85610 PROTHROMBIN TIME: CPT

## 2025-01-14 PROCEDURE — 2060000000 HC ICU INTERMEDIATE R&B

## 2025-01-14 PROCEDURE — 99152 MOD SED SAME PHYS/QHP 5/>YRS: CPT | Performed by: INTERNAL MEDICINE

## 2025-01-14 PROCEDURE — 4A023N7 MEASUREMENT OF CARDIAC SAMPLING AND PRESSURE, LEFT HEART, PERCUTANEOUS APPROACH: ICD-10-PCS | Performed by: THORACIC SURGERY (CARDIOTHORACIC VASCULAR SURGERY)

## 2025-01-14 PROCEDURE — 85520 HEPARIN ASSAY: CPT

## 2025-01-14 PROCEDURE — C1769 GUIDE WIRE: HCPCS | Performed by: INTERNAL MEDICINE

## 2025-01-14 PROCEDURE — 99223 1ST HOSP IP/OBS HIGH 75: CPT | Performed by: INTERNAL MEDICINE

## 2025-01-14 PROCEDURE — 2500000003 HC RX 250 WO HCPCS: Performed by: STUDENT IN AN ORGANIZED HEALTH CARE EDUCATION/TRAINING PROGRAM

## 2025-01-14 PROCEDURE — 2580000003 HC RX 258: Performed by: EMERGENCY MEDICINE

## 2025-01-14 PROCEDURE — 6370000000 HC RX 637 (ALT 250 FOR IP): Performed by: STUDENT IN AN ORGANIZED HEALTH CARE EDUCATION/TRAINING PROGRAM

## 2025-01-14 PROCEDURE — 93454 CORONARY ARTERY ANGIO S&I: CPT | Performed by: INTERNAL MEDICINE

## 2025-01-14 PROCEDURE — 84443 ASSAY THYROID STIM HORMONE: CPT

## 2025-01-14 PROCEDURE — 6360000002 HC RX W HCPCS: Performed by: INTERNAL MEDICINE

## 2025-01-14 PROCEDURE — 99222 1ST HOSP IP/OBS MODERATE 55: CPT | Performed by: INTERNAL MEDICINE

## 2025-01-14 PROCEDURE — 2500000003 HC RX 250 WO HCPCS: Performed by: EMERGENCY MEDICINE

## 2025-01-14 PROCEDURE — 6360000004 HC RX CONTRAST MEDICATION: Performed by: STUDENT IN AN ORGANIZED HEALTH CARE EDUCATION/TRAINING PROGRAM

## 2025-01-14 PROCEDURE — 83735 ASSAY OF MAGNESIUM: CPT

## 2025-01-14 PROCEDURE — C1894 INTRO/SHEATH, NON-LASER: HCPCS | Performed by: INTERNAL MEDICINE

## 2025-01-14 PROCEDURE — 36415 COLL VENOUS BLD VENIPUNCTURE: CPT

## 2025-01-14 PROCEDURE — 80048 BASIC METABOLIC PNL TOTAL CA: CPT

## 2025-01-14 PROCEDURE — 93010 ELECTROCARDIOGRAM REPORT: CPT | Performed by: INTERNAL MEDICINE

## 2025-01-14 PROCEDURE — 2580000003 HC RX 258: Performed by: NURSE PRACTITIONER

## 2025-01-14 PROCEDURE — 83036 HEMOGLOBIN GLYCOSYLATED A1C: CPT

## 2025-01-14 PROCEDURE — C8929 TTE W OR WO FOL WCON,DOPPLER: HCPCS

## 2025-01-14 PROCEDURE — 85027 COMPLETE CBC AUTOMATED: CPT

## 2025-01-14 PROCEDURE — 84100 ASSAY OF PHOSPHORUS: CPT

## 2025-01-14 PROCEDURE — 93458 L HRT ARTERY/VENTRICLE ANGIO: CPT | Performed by: INTERNAL MEDICINE

## 2025-01-14 PROCEDURE — 2500000003 HC RX 250 WO HCPCS: Performed by: INTERNAL MEDICINE

## 2025-01-14 PROCEDURE — 80061 LIPID PANEL: CPT

## 2025-01-14 PROCEDURE — 93306 TTE W/DOPPLER COMPLETE: CPT | Performed by: INTERNAL MEDICINE

## 2025-01-14 PROCEDURE — 83880 ASSAY OF NATRIURETIC PEPTIDE: CPT

## 2025-01-14 PROCEDURE — 84484 ASSAY OF TROPONIN QUANT: CPT

## 2025-01-14 PROCEDURE — 99153 MOD SED SAME PHYS/QHP EA: CPT | Performed by: INTERNAL MEDICINE

## 2025-01-14 PROCEDURE — B2111ZZ FLUOROSCOPY OF MULTIPLE CORONARY ARTERIES USING LOW OSMOLAR CONTRAST: ICD-10-PCS | Performed by: THORACIC SURGERY (CARDIOTHORACIC VASCULAR SURGERY)

## 2025-01-14 RX ORDER — IOPAMIDOL 755 MG/ML
INJECTION, SOLUTION INTRAVASCULAR PRN
Status: DISCONTINUED | OUTPATIENT
Start: 2025-01-14 | End: 2025-01-14 | Stop reason: HOSPADM

## 2025-01-14 RX ORDER — FENTANYL CITRATE 50 UG/ML
INJECTION, SOLUTION INTRAMUSCULAR; INTRAVENOUS PRN
Status: DISCONTINUED | OUTPATIENT
Start: 2025-01-14 | End: 2025-01-14 | Stop reason: HOSPADM

## 2025-01-14 RX ORDER — MIDAZOLAM HYDROCHLORIDE 1 MG/ML
INJECTION, SOLUTION INTRAMUSCULAR; INTRAVENOUS PRN
Status: DISCONTINUED | OUTPATIENT
Start: 2025-01-14 | End: 2025-01-14 | Stop reason: HOSPADM

## 2025-01-14 RX ADMIN — SODIUM CHLORIDE, PRESERVATIVE FREE 10 ML: 5 INJECTION INTRAVENOUS at 20:26

## 2025-01-14 RX ADMIN — DIGOXIN 250 MCG: 125 TABLET ORAL at 07:49

## 2025-01-14 RX ADMIN — ASPIRIN 81 MG: 81 TABLET, CHEWABLE ORAL at 07:53

## 2025-01-14 RX ADMIN — SULFUR HEXAFLUORIDE 2 ML: 60.7; .19; .19 INJECTION, POWDER, LYOPHILIZED, FOR SUSPENSION INTRAVENOUS; INTRAVESICAL at 13:02

## 2025-01-14 RX ADMIN — SODIUM CHLORIDE, PRESERVATIVE FREE 10 ML: 5 INJECTION INTRAVENOUS at 07:53

## 2025-01-14 RX ADMIN — HEPARIN SODIUM 4000 UNITS: 1000 INJECTION INTRAVENOUS; SUBCUTANEOUS at 02:43

## 2025-01-14 RX ADMIN — HEPARIN SODIUM 4000 UNITS: 1000 INJECTION INTRAVENOUS; SUBCUTANEOUS at 09:28

## 2025-01-14 RX ADMIN — SODIUM CHLORIDE 500 ML: 9 INJECTION, SOLUTION INTRAVENOUS at 00:07

## 2025-01-14 RX ADMIN — Medication 10 MG/HR: at 06:00

## 2025-01-14 RX ADMIN — HEPARIN SODIUM 16 UNITS/KG/HR: 10000 INJECTION, SOLUTION INTRAVENOUS at 12:13

## 2025-01-14 RX ADMIN — SODIUM CHLORIDE, PRESERVATIVE FREE 10 ML: 5 INJECTION INTRAVENOUS at 01:03

## 2025-01-14 RX ADMIN — ATORVASTATIN CALCIUM 40 MG: 40 TABLET, FILM COATED ORAL at 20:26

## 2025-01-14 RX ADMIN — PANTOPRAZOLE SODIUM 40 MG: 40 TABLET, DELAYED RELEASE ORAL at 06:04

## 2025-01-14 ASSESSMENT — PAIN SCALES - GENERAL
PAINLEVEL_OUTOF10: 0

## 2025-01-14 NOTE — H&P
V2.0  History and Physical      Name:  Kojo Gracia /Age/Sex: 1965  (59 y.o. male)   MRN & CSN:  [id number] & [id number] Encounter Date/Time: 2025 11:19 PM EST   Location:  [address] PCP: Lucho Traylor MD       Hospital Day: 1    Assessment and Plan:   Kojo Gracia is a 59 y.o. male  who presents with Atrial fibrillation (HCC)    Hospital Problems             Last Modified POA    * (Principal) Atrial fibrillation (HCC) 2025 Yes         Assessment and Plan:  Atypical chest pain ACS rule out.  Elevated heart score of more than 4.  Cardiology to be consulted currently on heparin drip because of A-fib RVR.  Trend troponins.  Telemetry in place.  Admitted to stepdown  Atrial fibrillation with rapid ventricle response started on heparin drip and Cardizem drip.  Echocardiogram to be ordered.  Cardiology to be consulted telemetry in place.  Hypomagnesemia replete as needed goal magnesium level of 2 and potassium of 4  History of stable aortic aneurysm of 5.1 cm    Medical Decision Making:  The following items were considered in medical decision making:  Discussion of patient care with other providers  Reviewed clinical lab tests if any  Reviewed radiology tests if any  Reviewed other diagnostic tests/interventions  Independent review of radiologic images if any  Microbiology cultures and other micro tests if any    Estimated time spent for medical decision-making encompassing complexity of the case, history taking, medication review, physical examination, communication with family, RN, , discussion with specialists, and ancillary staff members to provide accurate care for the patient was [id number] minutes.    MDM (any 2 required for High level billing)     A. Problems (any 1)  [x] Acute/Chronic Illness/injury posing ongoing threat to life and/or bodily function without ongoing treatment    [] Severe exacerbation of chronic illness

## 2025-01-14 NOTE — SEDATION DOCUMENTATION
[facility]  Pre-sedation Assessment   PROCEDURE   Planned Procedure Cardiac catheterization   Consent I have discussed with the patient and/or the patient representative the indication, alternatives, and the possible risks and/or complications of the planned procedure and the anesthesia methods. The patient and/or patient representative appear to understand and agree to proceed.   INDICATION   Chief Complaint Chest Pain/Pressure  Anginal Equivalent  Dyspnea on Exertion  Dyspnea   Presentation New Onset Angina <= 2 months, Worsening Angina, and Suspected CAD   Anginal Class (2 wks) CCS III - Symptoms with everyday living activities, i.e., moderate limitation   Anginal Symptoms Typical chest pain or anginal equivalent   CHF Class (2 wks) [] I     []II     []III     []IV    CV Instability Yes   ISCHEMIC WORKUP/MANAGEMENT/EVALUTION   Stress Test No   Anginal Meds Yes: Aspirin, Ca Channel Blockers, and STATIN   UPCOMING SURGERY   Valve surgery No   MEDICAL HISTORY   Vital Signs /66   Pulse 92   Temp 99.3 °F (37.4 °C) (Oral)   Resp 16   Ht 1.753 m (5' 9\")   Wt 126.3 kg (278 lb 6.4 oz)   SpO2 91%   BMI 41.11 kg/m²    Allergies Reviewed in EMR   Medications Reviewed in EMR   Medical History Reviewed in EMR   Surgical History Reviewed in EMR   PRE-SEDATION   Exam I have assessed the patient and reviewed the H&P on the chart.   Hx Anesthesia Issue None   Mod Mallampati II   ASA Class Class 2 - A normal healthy patient with mild systemic disease   KIMO SCALE   Activity 2 - Able to move 4 extrem on command   Respiration 2 - Able to breath deeply and cough freely   Circulation 2 - BP +/- 20mmHg of normal   Consciousness 2 - Fully awake   Oxygen Saturation 2 - Able to maintain oxygen sat >92% on room air   SEDATION/ANESTHESIA PLAN   Goal Guard patient safety and welfare. Minimize physical discomfort and pain. Minimize negative psychological responses to treatment by providing sedation and analgesia

## 2025-01-14 NOTE — TELEPHONE ENCOUNTER
RN at bedside assessing CP patient states “ hardly feels any CP now”. It is much better than it was.     Repeat EKG st depressions and HR improved now sinus tach low 100s occasionally peaks at 120.   Trop trend 9, 21, 48     On cardizem (12.5mg/hr) and heparin drips. BP borderline will order some hydration.     Call with any concerns

## 2025-01-14 NOTE — FLOWSHEET NOTE
Pt admitted to room 5907. Currently on magnesium, heparin, and Cardizem gtt. Pt states chest pain better after dilaudid, 2/10. Wife at bedside.    01/13/25 2123   Vital Signs   Temp 97.4 °F (36.3 °C)   Temp Source Temporal   Pulse (!) 124   Heart Rate Source Telemetry   Respirations 26   BP (!) 137/118   MAP (Calculated) 124   BP Location Left upper arm   BP Method Automatic   Patient Position Turns self;Semi fowlers   Pain Assessment   Pain Assessment 0-10   Pain Level 2   Patient's Stated Pain Goal 0 - No pain   Pain Location Chest   Oxygen Therapy   SpO2 92 %   O2 Device None (Room air)   Height and Weight   Weight - Scale 126.4 kg (278 lb 10.6 oz)   Weight Method Bed scale   BMI (Calculated) 41.2   Rhythm Interpretation   Cardiac Rhythm A fib RVR

## 2025-01-14 NOTE — ED NOTES
Pt transported to [address] [place] [address] by this RN in stable conditions, on telemetry, and all medications infusing per MAR.

## 2025-01-14 NOTE — ED NOTES
Patient Name: Kojo Gracia  : 1965 59 y.o.  MRN: [id number]  ED Room #: ED-0002/02     Chief complaint:   Chief Complaint   Patient presents with    Chest Pain     Pt states that 30 minutes ago was dizzy, chest burning, some SOB. Pt has aortic aneurysm supposed to have scan Thursday.      Hospital Problem/Diagnosis:   Hospital Problems             Last Modified POA    * (Principal) Atrial fibrillation (HCC) 2025 Yes         O2 Flow Rate:O2 Device: None (Room air)   (if applicable)  Cardiac Rhythm:   (if applicable)  Active LDA's:   Peripheral IV 25 Distal;Left;Anterior Cephalic (Active)       Peripheral IV 25 Right Antecubital (Active)   Site Assessment Clean, dry & intact 25   Line Status Blood return noted;Flushed 25   Phlebitis Assessment No symptoms 25   Infiltration Assessment 0 25   Dressing Status New dressing applied 25   Dressing Type Transparent 25   Dressing Intervention New 25            How does patient ambulate? Stand by assist    2. How does patient take pills? Whole with Water    3. Is patient alert? Alert    4. Is patient oriented? To Person, To Place, To Time, and To Situation    5.   Patient arrived from:  home  Facility Name: ___________________________________________    6. If patient is disoriented or from a Skill Nursing Facility has family been notified of admission? No    7. Patient belongings? Belongings: Cell Phone, Wallet, and Clothing    Disposition of belongings? Kept with Patient     8. Any specific patient or family belongings/needs/dynamics?   a. N/A    9. Miscellaneous comments/pending orders?  a. A&Ox4, ambulatory, from home, wife at bedside.       If there are any additional questions please reach out to the Emergency Department.

## 2025-01-15 ENCOUNTER — APPOINTMENT (OUTPATIENT)
Dept: VASCULAR LAB | Age: 60
DRG: 216 | End: 2025-01-15
Payer: COMMERCIAL

## 2025-01-15 ENCOUNTER — ANESTHESIA EVENT (OUTPATIENT)
Dept: OPERATING ROOM | Age: 60
End: 2025-01-15
Payer: COMMERCIAL

## 2025-01-15 PROBLEM — I48.0 PAF (PAROXYSMAL ATRIAL FIBRILLATION) (HCC): Status: ACTIVE | Noted: 2025-01-15

## 2025-01-15 PROBLEM — E88.82 CLASS 3 OBESITY DUE TO DISRUPTION OF MC4R PATHWAY WITH SERIOUS COMORBIDITY AND BODY MASS INDEX (BMI) OF 40.0 TO 44.9 IN ADULT: Status: ACTIVE | Noted: 2025-01-15

## 2025-01-15 PROBLEM — E66.813 CLASS 3 OBESITY DUE TO DISRUPTION OF MC4R PATHWAY WITH SERIOUS COMORBIDITY AND BODY MASS INDEX (BMI) OF 40.0 TO 44.9 IN ADULT: Status: ACTIVE | Noted: 2025-01-15

## 2025-01-15 PROBLEM — Z91.89 AT RISK FOR SLEEP APNEA: Status: ACTIVE | Noted: 2025-01-15

## 2025-01-15 PROBLEM — I71.21 ANEURYSM OF ASCENDING AORTA WITHOUT RUPTURE (HCC): Status: ACTIVE | Noted: 2025-01-15

## 2025-01-15 PROBLEM — R00.1 BRADYCARDIA: Status: ACTIVE | Noted: 2025-01-15

## 2025-01-15 PROBLEM — Z15.2 CLASS 3 OBESITY DUE TO DISRUPTION OF MC4R PATHWAY WITH SERIOUS COMORBIDITY AND BODY MASS INDEX (BMI) OF 40.0 TO 44.9 IN ADULT: Status: ACTIVE | Noted: 2025-01-15

## 2025-01-15 LAB
ABO + RH BLD: NORMAL
ANION GAP SERPL CALCULATED.3IONS-SCNC: 9 MMOL/L (ref 3–16)
BASE EXCESS BLDA CALC-SCNC: 0 MMOL/L (ref -3–3)
BILIRUB UR QL STRIP.AUTO: NEGATIVE
BLD GP AB SCN SERPL QL: NORMAL
BUN SERPL-MCNC: 14 MG/DL (ref 7–20)
CALCIUM SERPL-MCNC: 9.5 MG/DL (ref 8.3–10.6)
CHLORIDE SERPL-SCNC: 105 MMOL/L (ref 99–110)
CLARITY UR: CLEAR
CO2 BLDA-SCNC: 56.8 MMOL/L
CO2 SERPL-SCNC: 22 MMOL/L (ref 21–32)
COHGB MFR BLDA: 0.5 % (ref 0–1.5)
COLOR UR: YELLOW
CREAT SERPL-MCNC: 0.9 MG/DL (ref 0.8–1.3)
DEPRECATED RDW RBC AUTO: 13.8 % (ref 12.4–15.4)
ECHO BSA: 2.48 M2
EKG ATRIAL RATE: 62 BPM
EKG DIAGNOSIS: NORMAL
EKG P AXIS: 31 DEGREES
EKG P-R INTERVAL: 156 MS
EKG Q-T INTERVAL: 450 MS
EKG QRS DURATION: 102 MS
EKG QTC CALCULATION (BAZETT): 456 MS
EKG R AXIS: -35 DEGREES
EKG T AXIS: 7 DEGREES
EKG VENTRICULAR RATE: 62 BPM
EST. AVERAGE GLUCOSE BLD GHB EST-MCNC: 139.9 MG/DL
GFR SERPLBLD CREATININE-BSD FMLA CKD-EPI: >90 ML/MIN/{1.73_M2}
GLUCOSE SERPL-MCNC: 146 MG/DL (ref 70–99)
GLUCOSE UR STRIP.AUTO-MCNC: NEGATIVE MG/DL
HBA1C MFR BLD: 6.5 %
HCO3 BLDA-SCNC: 24.2 MMOL/L (ref 21–29)
HCT VFR BLD AUTO: 47.3 % (ref 40.5–52.5)
HGB BLD-MCNC: 16.2 G/DL (ref 13.5–17.5)
HGB BLDA-MCNC: 16.7 G/DL (ref 13.5–17.5)
HGB UR QL STRIP.AUTO: NEGATIVE
KETONES UR STRIP.AUTO-MCNC: NEGATIVE MG/DL
LEUKOCYTE ESTERASE UR QL STRIP.AUTO: NEGATIVE
MAGNESIUM SERPL-MCNC: 2.09 MG/DL (ref 1.8–2.4)
MCH RBC QN AUTO: 31.7 PG (ref 26–34)
MCHC RBC AUTO-ENTMCNC: 34.2 G/DL (ref 31–36)
MCV RBC AUTO: 92.5 FL (ref 80–100)
METHGB MFR BLDA: 0.6 %
NITRITE UR QL STRIP.AUTO: NEGATIVE
O2 THERAPY: ABNORMAL
PCO2 BLDA: 37.4 MMHG (ref 35–45)
PH BLDA: 7.42 [PH] (ref 7.35–7.45)
PH UR STRIP.AUTO: 7.5 [PH] (ref 5–8)
PHOSPHATE SERPL-MCNC: 2.9 MG/DL (ref 2.5–4.9)
PLATELET # BLD AUTO: 169 K/UL (ref 135–450)
PMV BLD AUTO: 8.5 FL (ref 5–10.5)
PO2 BLDA: 156 MMHG (ref 75–108)
POTASSIUM SERPL-SCNC: 4.1 MMOL/L (ref 3.5–5.1)
PROT UR STRIP.AUTO-MCNC: NEGATIVE MG/DL
RBC # BLD AUTO: 5.11 M/UL (ref 4.2–5.9)
SAO2 % BLDA: 98.3 %
SODIUM SERPL-SCNC: 136 MMOL/L (ref 136–145)
SP GR UR STRIP.AUTO: 1.01 (ref 1–1.03)
UA COMPLETE W REFLEX CULTURE PNL UR: NORMAL
UA DIPSTICK W REFLEX MICRO PNL UR: NORMAL
URN SPEC COLLECT METH UR: NORMAL
UROBILINOGEN UR STRIP-ACNC: 1 E.U./DL
VAS LEFT CCA DIST EDV: 20.5 CM/S
VAS LEFT CCA DIST PSV: 81.2 CM/S
VAS LEFT CCA MID EDV: 20.5 CM/S
VAS LEFT CCA MID PSV: 76.6 CM/S
VAS LEFT CCA PROX EDV: 25.4 CM/S
VAS LEFT CCA PROX PSV: 101 CM/S
VAS LEFT ECA EDV: 16.9 CM/S
VAS LEFT ECA PSV: 81.2 CM/S
VAS LEFT ICA DIST EDV: 31.6 CM/S
VAS LEFT ICA DIST PSV: 74.8 CM/S
VAS LEFT ICA MID EDV: 30.4 CM/S
VAS LEFT ICA MID PSV: 80.1 CM/S
VAS LEFT ICA PROX EDV: 14.2 CM/S
VAS LEFT ICA PROX PSV: 41.3 CM/S
VAS LEFT ICA/CCA PSV: 1.05
VAS LEFT SUBCLAVIAN PROX PSV: 130 CM/S
VAS LEFT VERTEBRAL PSV: 57.9 CM/S
VAS RIGHT CCA DIST EDV: 29.4 CM/S
VAS RIGHT CCA DIST PSV: 91.8 CM/S
VAS RIGHT CCA MID EDV: 18.2 CM/S
VAS RIGHT CCA MID PSV: 80.6 CM/S
VAS RIGHT CCA PROX EDV: 14.7 CM/S
VAS RIGHT CCA PROX PSV: 87.6 CM/S
VAS RIGHT ECA PSV: 91.1 CM/S
VAS RIGHT ICA DIST EDV: 17.5 CM/S
VAS RIGHT ICA DIST PSV: 42.1 CM/S
VAS RIGHT ICA MID EDV: 23.7 CM/S
VAS RIGHT ICA MID PSV: 63.2 CM/S
VAS RIGHT ICA PROX EDV: 15.4 CM/S
VAS RIGHT ICA PROX PSV: 39.5 CM/S
VAS RIGHT ICA/CCA PSV: 0.78
VAS RIGHT SUBCLAVIAN PROX PSV: 118 CM/S
VAS RIGHT VERTEBRAL PSV: 38.4 CM/S
WBC # BLD AUTO: 7.7 K/UL (ref 4–11)

## 2025-01-15 PROCEDURE — P9059 PLASMA, FRZ BETWEEN 8-24HOUR: HCPCS

## 2025-01-15 PROCEDURE — 93010 ELECTROCARDIOGRAM REPORT: CPT | Performed by: INTERNAL MEDICINE

## 2025-01-15 PROCEDURE — 86901 BLOOD TYPING SEROLOGIC RH(D): CPT

## 2025-01-15 PROCEDURE — 93880 EXTRACRANIAL BILAT STUDY: CPT | Performed by: SURGERY

## 2025-01-15 PROCEDURE — 94010 BREATHING CAPACITY TEST: CPT

## 2025-01-15 PROCEDURE — 2140000000 HC CCU INTERMEDIATE R&B

## 2025-01-15 PROCEDURE — 6370000000 HC RX 637 (ALT 250 FOR IP): Performed by: THORACIC SURGERY (CARDIOTHORACIC VASCULAR SURGERY)

## 2025-01-15 PROCEDURE — 86900 BLOOD TYPING SEROLOGIC ABO: CPT

## 2025-01-15 PROCEDURE — 83735 ASSAY OF MAGNESIUM: CPT

## 2025-01-15 PROCEDURE — 36415 COLL VENOUS BLD VENIPUNCTURE: CPT

## 2025-01-15 PROCEDURE — 84100 ASSAY OF PHOSPHORUS: CPT

## 2025-01-15 PROCEDURE — 6370000000 HC RX 637 (ALT 250 FOR IP): Performed by: STUDENT IN AN ORGANIZED HEALTH CARE EDUCATION/TRAINING PROGRAM

## 2025-01-15 PROCEDURE — 93970 EXTREMITY STUDY: CPT

## 2025-01-15 PROCEDURE — P9017 PLASMA 1 DONOR FRZ W/IN 8 HR: HCPCS

## 2025-01-15 PROCEDURE — 81003 URINALYSIS AUTO W/O SCOPE: CPT

## 2025-01-15 PROCEDURE — 30233L1 TRANSFUSION OF NONAUTOLOGOUS FRESH PLASMA INTO PERIPHERAL VEIN, PERCUTANEOUS APPROACH: ICD-10-PCS | Performed by: THORACIC SURGERY (CARDIOTHORACIC VASCULAR SURGERY)

## 2025-01-15 PROCEDURE — 6A551Z2 PHERESIS OF PLATELETS, MULTIPLE: ICD-10-PCS | Performed by: THORACIC SURGERY (CARDIOTHORACIC VASCULAR SURGERY)

## 2025-01-15 PROCEDURE — 82803 BLOOD GASES ANY COMBINATION: CPT

## 2025-01-15 PROCEDURE — 2500000003 HC RX 250 WO HCPCS: Performed by: STUDENT IN AN ORGANIZED HEALTH CARE EDUCATION/TRAINING PROGRAM

## 2025-01-15 PROCEDURE — 86923 COMPATIBILITY TEST ELECTRIC: CPT

## 2025-01-15 PROCEDURE — 83036 HEMOGLOBIN GLYCOSYLATED A1C: CPT

## 2025-01-15 PROCEDURE — 93880 EXTRACRANIAL BILAT STUDY: CPT

## 2025-01-15 PROCEDURE — 30233K1 TRANSFUSION OF NONAUTOLOGOUS FROZEN PLASMA INTO PERIPHERAL VEIN, PERCUTANEOUS APPROACH: ICD-10-PCS | Performed by: THORACIC SURGERY (CARDIOTHORACIC VASCULAR SURGERY)

## 2025-01-15 PROCEDURE — 2500000003 HC RX 250 WO HCPCS

## 2025-01-15 PROCEDURE — P9035 PLATELET PHERES LEUKOREDUCED: HCPCS

## 2025-01-15 PROCEDURE — 93005 ELECTROCARDIOGRAM TRACING: CPT | Performed by: INTERNAL MEDICINE

## 2025-01-15 PROCEDURE — 99232 SBSQ HOSP IP/OBS MODERATE 35: CPT | Performed by: INTERNAL MEDICINE

## 2025-01-15 PROCEDURE — P9012 CRYOPRECIPITATE EACH UNIT: HCPCS

## 2025-01-15 PROCEDURE — 80048 BASIC METABOLIC PNL TOTAL CA: CPT

## 2025-01-15 PROCEDURE — 85027 COMPLETE CBC AUTOMATED: CPT

## 2025-01-15 PROCEDURE — 6370000000 HC RX 637 (ALT 250 FOR IP)

## 2025-01-15 PROCEDURE — 99232 SBSQ HOSP IP/OBS MODERATE 35: CPT | Performed by: NURSE PRACTITIONER

## 2025-01-15 PROCEDURE — 86850 RBC ANTIBODY SCREEN: CPT

## 2025-01-15 PROCEDURE — 30233M1 TRANSFUSION OF NONAUTOLOGOUS PLASMA CRYOPRECIPITATE INTO PERIPHERAL VEIN, PERCUTANEOUS APPROACH: ICD-10-PCS | Performed by: THORACIC SURGERY (CARDIOTHORACIC VASCULAR SURGERY)

## 2025-01-15 RX ORDER — MUPIROCIN 20 MG/G
OINTMENT TOPICAL 2 TIMES DAILY
Status: DISCONTINUED | OUTPATIENT
Start: 2025-01-15 | End: 2025-01-16 | Stop reason: HOSPADM

## 2025-01-15 RX ORDER — SODIUM CHLORIDE 9 MG/ML
INJECTION, SOLUTION INTRAVENOUS PRN
Status: DISCONTINUED | OUTPATIENT
Start: 2025-01-15 | End: 2025-01-16 | Stop reason: HOSPADM

## 2025-01-15 RX ORDER — SODIUM CHLORIDE 0.9 % (FLUSH) 0.9 %
5-40 SYRINGE (ML) INJECTION EVERY 12 HOURS SCHEDULED
Status: DISCONTINUED | OUTPATIENT
Start: 2025-01-15 | End: 2025-01-16 | Stop reason: HOSPADM

## 2025-01-15 RX ORDER — SODIUM CHLORIDE 0.9 % (FLUSH) 0.9 %
5-40 SYRINGE (ML) INJECTION PRN
Status: DISCONTINUED | OUTPATIENT
Start: 2025-01-15 | End: 2025-01-16 | Stop reason: HOSPADM

## 2025-01-15 RX ORDER — CHLORHEXIDINE GLUCONATE ORAL RINSE 1.2 MG/ML
15 SOLUTION DENTAL ONCE
Status: COMPLETED | OUTPATIENT
Start: 2025-01-15 | End: 2025-01-16

## 2025-01-15 RX ORDER — CHLORHEXIDINE GLUCONATE 40 MG/ML
SOLUTION TOPICAL SEE ADMIN INSTRUCTIONS
Status: DISCONTINUED | OUTPATIENT
Start: 2025-01-15 | End: 2025-01-16 | Stop reason: HOSPADM

## 2025-01-15 RX ORDER — ALPRAZOLAM 0.25 MG/1
0.25 TABLET ORAL ONCE
Status: COMPLETED | OUTPATIENT
Start: 2025-01-15 | End: 2025-01-15

## 2025-01-15 RX ADMIN — ASPIRIN 81 MG: 81 TABLET, CHEWABLE ORAL at 08:08

## 2025-01-15 RX ADMIN — DIGOXIN 250 MCG: 125 TABLET ORAL at 08:07

## 2025-01-15 RX ADMIN — ALPRAZOLAM 0.25 MG: 0.25 TABLET ORAL at 22:42

## 2025-01-15 RX ADMIN — ATORVASTATIN CALCIUM 40 MG: 40 TABLET, FILM COATED ORAL at 21:54

## 2025-01-15 RX ADMIN — MUPIROCIN: 20 OINTMENT TOPICAL at 21:52

## 2025-01-15 RX ADMIN — SODIUM CHLORIDE, PRESERVATIVE FREE 10 ML: 5 INJECTION INTRAVENOUS at 21:54

## 2025-01-15 RX ADMIN — PANTOPRAZOLE SODIUM 40 MG: 40 TABLET, DELAYED RELEASE ORAL at 05:55

## 2025-01-15 RX ADMIN — SODIUM CHLORIDE, PRESERVATIVE FREE 10 ML: 5 INJECTION INTRAVENOUS at 08:09

## 2025-01-15 NOTE — CONSULTS
[facility]  H+P  Consult  OP Visit  FU Visit   CC/HPI   CC New patient visit for AF with RVR.   HPI 59 y.o., male admitted with cp, sob.  Found to be in AF with RVR.  Hx bicuspid AV, Asc AA.  CTA with stable aneurysm.  Echo pending   Cardiac Hx None   CARDIAC TESTING   EKG AFIB, RVR   Troponin Lab Results   Component Value Date    TROPHS 89 (H) 01/14/2025    TROPHS 48 (H) 01/13/2025    TROPHS 21 01/13/2025      HISTORY/ALLERGY/ROS   MEDHx  has a past medical history of Fatty liver, Hyperlipidemia, Hypertension, and Pancreatitis.   SURGHx  has a past surgical history that includes knee surgery (Right, 11/2021) and Steroid injection knee (Right).   SOCHx  reports that he has been smoking cigarettes. He has never used smokeless tobacco. He reports current alcohol use. He reports that he does not use drugs.   FAMHx family history includes Heart Attack in his father.   ALLERG Patient has no known allergies.   ROS Full ROS obtained and negative except as mentioned in HPI   MEDICATIONS   Medications reviewed in Epic.   PHYSICAL EXAM   Vitals /66   Pulse 92   Temp 99.3 °F (37.4 °C) (Oral)   Resp 16   Ht 1.753 m (5' 9\")   Wt 126.3 kg (278 lb 6.4 oz)   SpO2 91%   BMI 41.11 kg/m²    Gen Alert, coop, no distress Heart  Rrr, 2/6   Head NC, AT, no abnorm Abd  Soft, NT, +BS, no mass, no OM   Eyes PER, conj/corn clear Ext  Ext nl, AT, no C/C/E   Nose Nares nl, no drain, NT Pulse 2+ and symmetric   Throat Lips, mucosa, tongue nl Skin Col/text/turg nl, no vis rash/les   Neck S/S, TM, NT, no bruit/JVD Psych Nl mood and affect   Lung CTA-B, unlabored, no DTP Lymph   No cervical or axillary LA   Ch wall NT, no deform Neuro  Nl gross M/S exam      ASSESSMENT TIME   N/A   ASSESSMENT AND PLAN   *AS  Status Bicuspid  TTE 10/23 55%, MG 29, Asc AA 5.2  Plan Echo pending for reassessment  *Asc AA  Status chronic  CTA 1/25 5.1 (stable)  Plan Stable by CT, will need to be repaired with SAVR  *AF  Status New onset, 
Department of Cardiovascular & Thoracic Surgery  Consult          DIAGNOSIS:  chest pain with multivessel coronary artery disease with  severe BAV-AS    CHIEF COMPLAINT:    Chief Complaint   Patient presents with    Chest Pain     Pt states that 30 minutes ago was dizzy, chest burning, some SOB. Pt has aortic aneurysm supposed to have scan Thursday.        History Obtained From:  patient    HISTORY OF PRESENT ILLNESS:      The patient is a 59 y.o. male with significant past medical history of HTN, HLD, tobacco use, alcohol use (6-7 beers every Saturday), ascending AAA, fatty liver, obesity and pancreatitis who presents with chest pain and some dizziness and shortness of breath with it. Patient also had a known history of an ascending thoracic aneurysm around 5.1 cm that he was supposed to get scanned for later this week. Troponins were elevated at 89 yesterday and 48 the day before.  Patient and wife believe he might have undiagnosed sleep apnea. Patient is currently hemodynamically stable and denies any chest pain at this time.    CVTS consulted for multivessel coronary artery diasease with severe aortic stenosis.     Past Medical History:        Diagnosis Date    Fatty liver     Hyperlipidemia     Hypertension     Pancreatitis        Past Surgical History:        Procedure Laterality Date    KNEE SURGERY Right 11/2021    meniscus repair    STEROID INJECTION KNEE Right        Medications Prior to Admission:   Medications Prior to Admission: fenofibric acid (TRILIPIX) 135 MG CPDR capsule, Take 1 capsule by mouth daily  ibuprofen (ADVIL;MOTRIN) 800 MG tablet, Take 1 tablet by mouth every 8 hours as needed for Pain  Garlic 500 MG TABS, Take 500 mg by mouth every evening  niacin 500 MG tablet, Take 1 tablet by mouth every evening  aspirin 81 MG EC tablet, Take 1 tablet by mouth daily  lisinopril (PRINIVIL;ZESTRIL) 10 MG tablet, Take 1 tablet by mouth daily  omeprazole (PRILOSEC) 40 MG delayed release capsule, Take 1 
RVR    EC25  Atrial fibrillation with RVR    ECHO: 10/23   Normal left ventricular cavity size and systolic function with an estimated   ejection fraction of 55%. Moderate concentric left ventricular hypertrophy   is present. No regional wall motion abnormalities are noted.   Grade I diastolic dysfunction with normal LV filling pressures.   The right ventricle is normal in size and systolic function.   The right atrium is mildly dilated.   Aortic valve leaflets appear thickened and calcified. Bicuspid aortic valve. Evidence of eccentric diastolic closure of aortic valve leaflets noted on M-Mode. The right and left coronary cusps appear to be fused. Moderate   aortic valve stenosis with a PV of 3.26 m/s, MPG of 29 mmHg, and MACRINA of 1.69 cm2. Mild mitral annular calcification is present.   The aortic root is mildly dilated.   The ascending aorta is severely dilated with a diameter of 5.2cm, which   appears unchanged from previous echocardiogram in 2023.    CTA Chest: 25  Unchanged ascending thoracic aortic aneurysm measuring up to 5.1 cm in diameter.  No evidence of thoracic aortic dissection.     No acute thoracic abnormality identified.    Problem List:   Patient Active Problem List    Diagnosis Date Noted    Atrial fibrillation (HCC) 2025        Assessment and Plan:   1.Atrial Fibrillation  - New onset of unknown duration  - Currently in NSR (converted today)  - Continue cardizem gtt and switch to diltiazem 120 mg CD  - [id number] score:1; [id number] Vasc score and anticoagulation discussed. High risk for stroke and thromboembolism. Anticoagulation is recommended. Risk of bleeding was discussed.  ~ Continue heparin gtt and changed to oral anticoagulation apixaban 5 mg twice daily until he sees me in the clinic.    - Afib risk factors including age, HTN, obesity, inactivity and SID were discussed with patient. Risk factor modification recommended   ~ TSH normal (2025)    ~ Recommend outpatient

## 2025-01-15 NOTE — FLOWSHEET NOTE
01/15/25 1545   Vitals   Temp 98 °F (36.7 °C)   Temp Source Temporal   Pulse 67   Heart Rate Source Monitor   Respirations 20   BP (!) 147/79   MAP (Calculated) 102   MAP (mmHg) 99   BP Location Left Arm   BP Upper/Lower Upper   BP Method Automatic   Patient Position Semi fowlers   Cardiac Rhythm Sinus rhythm   Oxygen Therapy   SpO2 98 %   Pulse Oximetry Type Intermittent   Pulse Oximeter Device Mode Intermittent   Pulse Oximeter Device Location Finger   O2 Device None (Room air)   O2 Flow Rate (L/min) 0 L/min     Pt admitted to CVU 2908. Report received bedside by this RN. Shift assessment complete- see complex assessment data in flowsheet. VSS. Pt alert and oriented x4. POC discussed with pt, no further questions or concerns at this time. Bed in lowest position, call light within reach.   Electronically signed by Ryann Keita RN on 1/15/2025 at 6:28 PM

## 2025-01-15 NOTE — ANESTHESIA PRE PROCEDURE
Date/Time    WBC 7.7 01/15/2025 04:45 AM    RBC 5.11 01/15/2025 04:45 AM    HGB 16.2 01/15/2025 04:45 AM    HCT 47.3 01/15/2025 04:45 AM    MCV 92.5 01/15/2025 04:45 AM    RDW 13.8 01/15/2025 04:45 AM     01/15/2025 04:45 AM       CMP:   Lab Results   Component Value Date/Time     01/15/2025 04:45 AM    K 4.1 01/15/2025 04:45 AM    K 3.9 01/13/2025 05:53 PM     01/15/2025 04:45 AM    CO2 22 01/15/2025 04:45 AM    BUN 14 01/15/2025 04:45 AM    CREATININE 0.9 01/15/2025 04:45 AM    AGRATIO 1.5 01/13/2025 05:53 PM    LABGLOM >90 01/15/2025 04:45 AM    LABGLOM >60 10/24/2022 05:09 PM    GLUCOSE 146 01/15/2025 04:45 AM    CALCIUM 9.5 01/15/2025 04:45 AM    BILITOT 0.4 01/13/2025 05:53 PM    ALKPHOS 46 01/13/2025 05:53 PM    AST 43 01/13/2025 05:53 PM    ALT 49 01/13/2025 05:53 PM       POC Tests: No results for input(s): \"POCGLU\", \"POCNA\", \"POCK\", \"POCCL\", \"POCBUN\", \"POCHEMO\", \"POCHCT\" in the last 72 hours.    Coags:   Lab Results   Component Value Date/Time    PROTIME 13.1 01/14/2025 01:57 AM    INR 0.97 01/14/2025 01:57 AM    APTT 99.6 01/13/2025 08:27 PM       HCG (If Applicable): No results found for: \"PREGTESTUR\", \"PREGSERUM\", \"HCG\", \"HCGQUANT\"     ABGs: No results found for: \"PHART\", \"PO2ART\", \"PKJ0RZP\", \"QJI8YIL\", \"BEART\", \"J0VOJIDC\"     Type & Screen (If Applicable):  No results found for: \"ABORH\", \"LABANTI\"    Drug/Infectious Status (If Applicable):  No results found for: \"HIV\", \"HEPCAB\"    COVID-19 Screening (If Applicable): No results found for: \"COVID19\"        Anesthesia Evaluation  Patient summary reviewed and Nursing notes reviewed  Airway: Mallampati: II          Dental:          Pulmonary:                              Cardiovascular:  Exercise tolerance: poor (<4 METS)  (+) hypertension: severe               ROS comment: Echo 2021  ·  Left Ventricle: Hyperdynamic left ventricular systolic function with a visually estimated EF of 70%. Left ventricle size is normal. Moderately increased

## 2025-01-16 ENCOUNTER — APPOINTMENT (OUTPATIENT)
Dept: GENERAL RADIOLOGY | Age: 60
DRG: 216 | End: 2025-01-16
Payer: COMMERCIAL

## 2025-01-16 ENCOUNTER — ANESTHESIA (OUTPATIENT)
Dept: OPERATING ROOM | Age: 60
End: 2025-01-16
Payer: COMMERCIAL

## 2025-01-16 LAB
ANION GAP SERPL CALCULATED.3IONS-SCNC: 10 MMOL/L (ref 3–16)
ANION GAP SERPL CALCULATED.3IONS-SCNC: 11 MMOL/L (ref 3–16)
APTT BLD: 26.9 SEC (ref 22.1–36.4)
APTT BLD: 27.1 SEC (ref 22.1–36.4)
APTT BLD: 27.9 SEC (ref 22.1–36.4)
BASE EXCESS BLDA CALC-SCNC: -1 MMOL/L (ref -3–3)
BASE EXCESS BLDA CALC-SCNC: -10 MMOL/L (ref -3–3)
BASE EXCESS BLDA CALC-SCNC: -11 MMOL/L (ref -3–3)
BASE EXCESS BLDA CALC-SCNC: -12 MMOL/L (ref -3–3)
BASE EXCESS BLDA CALC-SCNC: -2 MMOL/L (ref -3–3)
BASE EXCESS BLDA CALC-SCNC: -2 MMOL/L (ref -3–3)
BASE EXCESS BLDA CALC-SCNC: -3 MMOL/L (ref -3–3)
BASE EXCESS BLDA CALC-SCNC: 1 MMOL/L (ref -3–3)
BASE EXCESS BLDA CALC-SCNC: 2 MMOL/L (ref -3–3)
BASE EXCESS BLDA CALC-SCNC: 3 MMOL/L (ref -3–3)
BASE EXCESS BLDA CALC-SCNC: 4 MMOL/L (ref -3–3)
BASE EXCESS BLDA CALC-SCNC: 7 MMOL/L (ref -3–3)
BASOPHILS # BLD: 0 K/UL (ref 0–0.2)
BASOPHILS # BLD: 0 K/UL (ref 0–0.2)
BASOPHILS NFR BLD: 0.4 %
BASOPHILS NFR BLD: 0.5 %
BLOOD BANK DISPENSE STATUS: NORMAL
BLOOD BANK PRODUCT CODE: NORMAL
BPU ID: NORMAL
BUN SERPL-MCNC: 12 MG/DL (ref 7–20)
BUN SERPL-MCNC: 13 MG/DL (ref 7–20)
CA-I BLD-SCNC: 1.05 MMOL/L (ref 1.12–1.32)
CA-I BLD-SCNC: 1.06 MMOL/L (ref 1.12–1.32)
CA-I BLD-SCNC: 1.07 MMOL/L (ref 1.12–1.32)
CA-I BLD-SCNC: 1.07 MMOL/L (ref 1.12–1.32)
CA-I BLD-SCNC: 1.08 MMOL/L (ref 1.12–1.32)
CA-I BLD-SCNC: 1.1 MMOL/L (ref 1.12–1.32)
CA-I BLD-SCNC: 1.14 MMOL/L (ref 1.12–1.32)
CA-I BLD-SCNC: 1.25 MMOL/L (ref 1.12–1.32)
CA-I BLD-SCNC: 1.26 MMOL/L (ref 1.12–1.32)
CA-I BLD-SCNC: 1.29 MMOL/L (ref 1.12–1.32)
CA-I BLD-SCNC: 1.29 MMOL/L (ref 1.12–1.32)
CA-I BLD-SCNC: 1.31 MMOL/L (ref 1.12–1.32)
CALCIUM SERPL-MCNC: 8.9 MG/DL (ref 8.3–10.6)
CALCIUM SERPL-MCNC: 9.9 MG/DL (ref 8.3–10.6)
CHLORIDE SERPL-SCNC: 102 MMOL/L (ref 99–110)
CHLORIDE SERPL-SCNC: 111 MMOL/L (ref 99–110)
CO2 BLDA-SCNC: 19 MMOL/L
CO2 BLDA-SCNC: 19 MMOL/L
CO2 BLDA-SCNC: 20 MMOL/L
CO2 BLDA-SCNC: 23 MMOL/L
CO2 BLDA-SCNC: 25 MMOL/L
CO2 BLDA-SCNC: 26 MMOL/L
CO2 BLDA-SCNC: 27 MMOL/L
CO2 BLDA-SCNC: 28 MMOL/L
CO2 BLDA-SCNC: 29 MMOL/L
CO2 BLDA-SCNC: 29 MMOL/L
CO2 BLDA-SCNC: 30 MMOL/L
CO2 SERPL-SCNC: 24 MMOL/L (ref 21–32)
CO2 SERPL-SCNC: 25 MMOL/L (ref 21–32)
CREAT SERPL-MCNC: 1.1 MG/DL (ref 0.8–1.3)
CREAT SERPL-MCNC: 1.1 MG/DL (ref 0.8–1.3)
DEPRECATED RDW RBC AUTO: 13.5 % (ref 12.4–15.4)
DEPRECATED RDW RBC AUTO: 13.5 % (ref 12.4–15.4)
DEPRECATED RDW RBC AUTO: 13.7 % (ref 12.4–15.4)
DESCRIPTION BLOOD BANK: NORMAL
ECHO BSA: 2.48 M2
EOSINOPHIL # BLD: 0 K/UL (ref 0–0.6)
EOSINOPHIL # BLD: 0.1 K/UL (ref 0–0.6)
EOSINOPHIL NFR BLD: 0.3 %
EOSINOPHIL NFR BLD: 1.4 %
FIBRINOGEN PPP-MCNC: 225 MG/DL (ref 227–534)
FIBRINOGEN PPP-MCNC: 312 MG/DL (ref 227–534)
FIBRINOGEN PPP-MCNC: 379 MG/DL (ref 227–534)
GFR SERPLBLD CREATININE-BSD FMLA CKD-EPI: 76 ML/MIN/{1.73_M2}
GFR SERPLBLD CREATININE-BSD FMLA CKD-EPI: 76 ML/MIN/{1.73_M2}
GLUCOSE BLD-MCNC: 110 MG/DL (ref 70–99)
GLUCOSE BLD-MCNC: 126 MG/DL (ref 70–99)
GLUCOSE BLD-MCNC: 127 MG/DL (ref 70–99)
GLUCOSE BLD-MCNC: 130 MG/DL (ref 70–99)
GLUCOSE BLD-MCNC: 133 MG/DL (ref 70–99)
GLUCOSE BLD-MCNC: 138 MG/DL (ref 70–99)
GLUCOSE BLD-MCNC: 180 MG/DL (ref 70–99)
GLUCOSE BLD-MCNC: 240 MG/DL (ref 70–99)
GLUCOSE BLD-MCNC: 242 MG/DL (ref 70–99)
GLUCOSE BLD-MCNC: 243 MG/DL (ref 70–99)
GLUCOSE BLD-MCNC: 262 MG/DL (ref 70–99)
GLUCOSE BLD-MCNC: 266 MG/DL (ref 70–99)
GLUCOSE BLD-MCNC: 273 MG/DL (ref 70–99)
GLUCOSE BLD-MCNC: 286 MG/DL (ref 70–99)
GLUCOSE SERPL-MCNC: 139 MG/DL (ref 70–99)
GLUCOSE SERPL-MCNC: 178 MG/DL (ref 70–99)
HCO3 BLDA-SCNC: 17.1 MMOL/L (ref 21–29)
HCO3 BLDA-SCNC: 17.1 MMOL/L (ref 21–29)
HCO3 BLDA-SCNC: 18.1 MMOL/L (ref 21–29)
HCO3 BLDA-SCNC: 22.3 MMOL/L (ref 21–29)
HCO3 BLDA-SCNC: 23.5 MMOL/L (ref 21–29)
HCO3 BLDA-SCNC: 23.9 MMOL/L (ref 21–29)
HCO3 BLDA-SCNC: 24 MMOL/L (ref 21–29)
HCO3 BLDA-SCNC: 24.4 MMOL/L (ref 21–29)
HCO3 BLDA-SCNC: 25.3 MMOL/L (ref 21–29)
HCO3 BLDA-SCNC: 25.5 MMOL/L (ref 21–29)
HCO3 BLDA-SCNC: 26.8 MMOL/L (ref 21–29)
HCO3 BLDA-SCNC: 27.6 MMOL/L (ref 21–29)
HCO3 BLDA-SCNC: 27.9 MMOL/L (ref 21–29)
HCO3 BLDA-SCNC: 29.2 MMOL/L (ref 21–29)
HCT VFR BLD AUTO: 27 % (ref 40.5–52.5)
HCT VFR BLD AUTO: 30 % (ref 40.5–52.5)
HCT VFR BLD AUTO: 30 % (ref 40.5–52.5)
HCT VFR BLD AUTO: 31 % (ref 40.5–52.5)
HCT VFR BLD AUTO: 32.3 % (ref 40.5–52.5)
HCT VFR BLD AUTO: 35 % (ref 40.5–52.5)
HCT VFR BLD AUTO: 39 % (ref 40.5–52.5)
HCT VFR BLD AUTO: 41.3 % (ref 40.5–52.5)
HCT VFR BLD AUTO: 42 % (ref 40.5–52.5)
HCT VFR BLD AUTO: 49 % (ref 40.5–52.5)
HCT VFR BLD AUTO: 49 % (ref 40.5–52.5)
HCT VFR BLD AUTO: 49.9 % (ref 40.5–52.5)
HGB BLD CALC-MCNC: 10.3 GM/DL (ref 13.5–17.5)
HGB BLD CALC-MCNC: 10.3 GM/DL (ref 13.5–17.5)
HGB BLD CALC-MCNC: 10.4 GM/DL (ref 13.5–17.5)
HGB BLD CALC-MCNC: 11.9 GM/DL (ref 13.5–17.5)
HGB BLD CALC-MCNC: 12 GM/DL (ref 13.5–17.5)
HGB BLD CALC-MCNC: 13.2 GM/DL (ref 13.5–17.5)
HGB BLD CALC-MCNC: 14.3 GM/DL (ref 13.5–17.5)
HGB BLD CALC-MCNC: 16.7 GM/DL (ref 13.5–17.5)
HGB BLD CALC-MCNC: 16.8 GM/DL (ref 13.5–17.5)
HGB BLD CALC-MCNC: 9.1 GM/DL (ref 13.5–17.5)
HGB BLD-MCNC: 11 G/DL (ref 13.5–17.5)
HGB BLD-MCNC: 14 G/DL (ref 13.5–17.5)
HGB BLD-MCNC: 17.1 G/DL (ref 13.5–17.5)
INR PPP: 0.97 (ref 0.85–1.15)
INR PPP: 1.39 (ref 0.85–1.15)
INR PPP: 1.84 (ref 0.85–1.15)
LACTATE BLD-SCNC: 0.73 MMOL/L (ref 0.4–2)
LACTATE BLD-SCNC: 0.9 MMOL/L (ref 0.4–2)
LACTATE BLD-SCNC: 1.53 MMOL/L (ref 0.4–2)
LACTATE BLD-SCNC: 1.78 MMOL/L (ref 0.4–2)
LACTATE BLD-SCNC: 3.84 MMOL/L (ref 0.4–2)
LACTATE BLD-SCNC: 4.88 MMOL/L (ref 0.4–2)
LACTATE BLD-SCNC: 5.13 MMOL/L (ref 0.4–2)
LACTATE BLD-SCNC: 5.39 MMOL/L (ref 0.4–2)
LACTATE BLD-SCNC: 5.85 MMOL/L (ref 0.4–2)
LACTATE BLD-SCNC: 6.14 MMOL/L (ref 0.4–2)
LACTATE BLD-SCNC: 6.23 MMOL/L (ref 0.4–2)
LACTATE BLD-SCNC: 6.58 MMOL/L (ref 0.4–2)
LYMPHOCYTES # BLD: 1.3 K/UL (ref 1–5.1)
LYMPHOCYTES # BLD: 2.2 K/UL (ref 1–5.1)
LYMPHOCYTES NFR BLD: 11.5 %
LYMPHOCYTES NFR BLD: 27.1 %
MAGNESIUM SERPL-MCNC: 1.97 MG/DL (ref 1.8–2.4)
MCH RBC QN AUTO: 31.4 PG (ref 26–34)
MCH RBC QN AUTO: 31.7 PG (ref 26–34)
MCH RBC QN AUTO: 31.7 PG (ref 26–34)
MCHC RBC AUTO-ENTMCNC: 33.9 G/DL (ref 31–36)
MCHC RBC AUTO-ENTMCNC: 34.1 G/DL (ref 31–36)
MCHC RBC AUTO-ENTMCNC: 34.3 G/DL (ref 31–36)
MCV RBC AUTO: 92.3 FL (ref 80–100)
MCV RBC AUTO: 92.7 FL (ref 80–100)
MCV RBC AUTO: 93 FL (ref 80–100)
MONOCYTES # BLD: 0.2 K/UL (ref 0–1.3)
MONOCYTES # BLD: 0.6 K/UL (ref 0–1.3)
MONOCYTES NFR BLD: 1.6 %
MONOCYTES NFR BLD: 7.1 %
NEUTROPHILS # BLD: 5.2 K/UL (ref 1.7–7.7)
NEUTROPHILS # BLD: 9.6 K/UL (ref 1.7–7.7)
NEUTROPHILS NFR BLD: 63.9 %
NEUTROPHILS NFR BLD: 86.2 %
PCO2 BLDA: 34.6 MM HG (ref 35–45)
PCO2 BLDA: 35.6 MM HG (ref 35–45)
PCO2 BLDA: 35.9 MM HG (ref 35–45)
PCO2 BLDA: 36.8 MM HG (ref 35–45)
PCO2 BLDA: 38.3 MM HG (ref 35–45)
PCO2 BLDA: 44.2 MM HG (ref 35–45)
PCO2 BLDA: 45.2 MM HG (ref 35–45)
PCO2 BLDA: 45.5 MM HG (ref 35–45)
PCO2 BLDA: 46.3 MM HG (ref 35–45)
PCO2 BLDA: 47.5 MM HG (ref 35–45)
PCO2 BLDA: 48.7 MM HG (ref 35–45)
PCO2 BLDA: 50 MM HG (ref 35–45)
PCO2 BLDA: 50.6 MM HG (ref 35–45)
PCO2 BLDA: 51.7 MM HG (ref 35–45)
PCO2 BLDA: 52.6 MM HG (ref 35–45)
PCO2 BLDA: 53.1 MM HG (ref 35–45)
PERFORMED ON: ABNORMAL
PH BLDA: 7.12 [PH] (ref 7.35–7.45)
PH BLDA: 7.18 [PH] (ref 7.35–7.45)
PH BLDA: 7.2 [PH] (ref 7.35–7.45)
PH BLDA: 7.27 [PH] (ref 7.35–7.45)
PH BLDA: 7.28 [PH] (ref 7.35–7.45)
PH BLDA: 7.31 [PH] (ref 7.35–7.45)
PH BLDA: 7.33 [PH] (ref 7.35–7.45)
PH BLDA: 7.34 [PH] (ref 7.35–7.45)
PH BLDA: 7.37 [PH] (ref 7.35–7.45)
PH BLDA: 7.39 [PH] (ref 7.35–7.45)
PH BLDA: 7.43 [PH] (ref 7.35–7.45)
PH BLDA: 7.46 [PH] (ref 7.35–7.45)
PH BLDA: 7.47 [PH] (ref 7.35–7.45)
PH BLDA: 7.54 [PH] (ref 7.35–7.45)
PHOSPHATE SERPL-MCNC: 3.5 MG/DL (ref 2.5–4.9)
PLATELET # BLD AUTO: 125 K/UL (ref 135–450)
PLATELET # BLD AUTO: 181 K/UL (ref 135–450)
PLATELET # BLD AUTO: 192 K/UL (ref 135–450)
PMV BLD AUTO: 7.9 FL (ref 5–10.5)
PMV BLD AUTO: 8.5 FL (ref 5–10.5)
PMV BLD AUTO: 8.8 FL (ref 5–10.5)
PO2 BLDA: 106.6 MM HG (ref 75–108)
PO2 BLDA: 120.8 MM HG (ref 75–108)
PO2 BLDA: 130.6 MM HG (ref 75–108)
PO2 BLDA: 140 MM HG (ref 75–108)
PO2 BLDA: 141.1 MM HG (ref 75–108)
PO2 BLDA: 147.3 MM HG (ref 75–108)
PO2 BLDA: 180 MM HG (ref 75–108)
PO2 BLDA: 208.3 MM HG (ref 75–108)
PO2 BLDA: 252.3 MM HG (ref 75–108)
PO2 BLDA: 257.9 MM HG (ref 75–108)
PO2 BLDA: 260.5 MM HG (ref 75–108)
PO2 BLDA: 306.8 MM HG (ref 75–108)
PO2 BLDA: 364.4 MM HG (ref 75–108)
PO2 BLDA: 373.9 MM HG (ref 75–108)
PO2 BLDA: 612.1 MM HG (ref 75–108)
PO2 BLDA: 94 MM HG (ref 75–108)
POC SAMPLE TYPE: ABNORMAL
POTASSIUM BLD-SCNC: 3.8 MMOL/L (ref 3.5–5.1)
POTASSIUM BLD-SCNC: 3.9 MMOL/L (ref 3.5–5.1)
POTASSIUM BLD-SCNC: 4 MMOL/L (ref 3.5–5.1)
POTASSIUM BLD-SCNC: 4.1 MMOL/L (ref 3.5–5.1)
POTASSIUM BLD-SCNC: 4.1 MMOL/L (ref 3.5–5.1)
POTASSIUM BLD-SCNC: 4.3 MMOL/L (ref 3.5–5.1)
POTASSIUM BLD-SCNC: 4.3 MMOL/L (ref 3.5–5.1)
POTASSIUM BLD-SCNC: 4.4 MMOL/L (ref 3.5–5.1)
POTASSIUM BLD-SCNC: 4.5 MMOL/L (ref 3.5–5.1)
POTASSIUM BLD-SCNC: 4.6 MMOL/L (ref 3.5–5.1)
POTASSIUM BLD-SCNC: 4.7 MMOL/L (ref 3.5–5.1)
POTASSIUM BLD-SCNC: 4.9 MMOL/L (ref 3.5–5.1)
POTASSIUM BLD-SCNC: 5 MMOL/L (ref 3.5–5.1)
POTASSIUM SERPL-SCNC: 4.1 MMOL/L (ref 3.5–5.1)
POTASSIUM SERPL-SCNC: 4.2 MMOL/L (ref 3.5–5.1)
PROTHROMBIN TIME: 13.1 SEC (ref 11.9–14.9)
PROTHROMBIN TIME: 17.3 SEC (ref 11.9–14.9)
PROTHROMBIN TIME: 21.3 SEC (ref 11.9–14.9)
RBC # BLD AUTO: 3.47 M/UL (ref 4.2–5.9)
RBC # BLD AUTO: 4.46 M/UL (ref 4.2–5.9)
RBC # BLD AUTO: 5.41 M/UL (ref 4.2–5.9)
SAO2 % BLDA: 100 % (ref 93–100)
SAO2 % BLDA: 97 % (ref 93–100)
SAO2 % BLDA: 98 % (ref 93–100)
SAO2 % BLDA: 98 % (ref 93–100)
SAO2 % BLDA: 99 % (ref 93–100)
SODIUM BLD-SCNC: 130 MMOL/L (ref 136–145)
SODIUM BLD-SCNC: 132 MMOL/L (ref 136–145)
SODIUM BLD-SCNC: 135 MMOL/L (ref 136–145)
SODIUM BLD-SCNC: 137 MMOL/L (ref 136–145)
SODIUM BLD-SCNC: 139 MMOL/L (ref 136–145)
SODIUM BLD-SCNC: 141 MMOL/L (ref 136–145)
SODIUM BLD-SCNC: 142 MMOL/L (ref 136–145)
SODIUM BLD-SCNC: 143 MMOL/L (ref 136–145)
SODIUM BLD-SCNC: 145 MMOL/L (ref 136–145)
SODIUM BLD-SCNC: 149 MMOL/L (ref 136–145)
SODIUM SERPL-SCNC: 137 MMOL/L (ref 136–145)
SODIUM SERPL-SCNC: 146 MMOL/L (ref 136–145)
VAS LEFT GSV AT KNEE DIAM: 4.9 MM
VAS LEFT GSV BK DIST DIAM: 2.8 MM
VAS LEFT GSV BK MID DIAM: 2.7 MM
VAS LEFT GSV BK PROX DIAM: 4.2 MM
VAS LEFT GSV JUNC DIAM: 7.8 MM
VAS LEFT GSV THIGH DIST DIAM: 5.8 MM
VAS LEFT GSV THIGH MID DIAM: 5.8 MM
VAS LEFT GSV THIGH PROX DIAM: 5.8 MM
VAS RIGHT GSV AT KNEE DIAM: 4.2 MM
VAS RIGHT GSV BK DIST DIAM: 3.7 MM
VAS RIGHT GSV BK MID DIAM: 2.4 MM
VAS RIGHT GSV BK PROX DIAM: 2.6 MM
VAS RIGHT GSV JUNC DIAM: 6 MM
VAS RIGHT GSV THIGH DIST DIAM: 3.7 MM
VAS RIGHT GSV THIGH MID DIAM: 4.3 MM
VAS RIGHT GSV THIGH PROX DIAM: 5.75 MM
WBC # BLD AUTO: 11.1 K/UL (ref 4–11)
WBC # BLD AUTO: 20.6 K/UL (ref 4–11)
WBC # BLD AUTO: 8.1 K/UL (ref 4–11)

## 2025-01-16 PROCEDURE — 6360000002 HC RX W HCPCS: Performed by: THORACIC SURGERY (CARDIOTHORACIC VASCULAR SURGERY)

## 2025-01-16 PROCEDURE — C1889 IMPLANT/INSERT DEVICE, NOC: HCPCS | Performed by: THORACIC SURGERY (CARDIOTHORACIC VASCULAR SURGERY)

## 2025-01-16 PROCEDURE — 021309W BYPASS CORONARY ARTERY, FOUR OR MORE ARTERIES FROM AORTA WITH AUTOLOGOUS VENOUS TISSUE, OPEN APPROACH: ICD-10-PCS | Performed by: THORACIC SURGERY (CARDIOTHORACIC VASCULAR SURGERY)

## 2025-01-16 PROCEDURE — 6360000002 HC RX W HCPCS

## 2025-01-16 PROCEDURE — 6370000000 HC RX 637 (ALT 250 FOR IP)

## 2025-01-16 PROCEDURE — 2500000003 HC RX 250 WO HCPCS: Performed by: THORACIC SURGERY (CARDIOTHORACIC VASCULAR SURGERY)

## 2025-01-16 PROCEDURE — 88304 TISSUE EXAM BY PATHOLOGIST: CPT

## 2025-01-16 PROCEDURE — 82947 ASSAY GLUCOSE BLOOD QUANT: CPT

## 2025-01-16 PROCEDURE — C1894 INTRO/SHEATH, NON-LASER: HCPCS | Performed by: THORACIC SURGERY (CARDIOTHORACIC VASCULAR SURGERY)

## 2025-01-16 PROCEDURE — 02RX0JZ REPLACEMENT OF THORACIC AORTA, ASCENDING/ARCH WITH SYNTHETIC SUBSTITUTE, OPEN APPROACH: ICD-10-PCS | Performed by: THORACIC SURGERY (CARDIOTHORACIC VASCULAR SURGERY)

## 2025-01-16 PROCEDURE — 84295 ASSAY OF SERUM SODIUM: CPT

## 2025-01-16 PROCEDURE — 82803 BLOOD GASES ANY COMBINATION: CPT

## 2025-01-16 PROCEDURE — C1751 CATH, INF, PER/CENT/MIDLINE: HCPCS | Performed by: THORACIC SURGERY (CARDIOTHORACIC VASCULAR SURGERY)

## 2025-01-16 PROCEDURE — 5A1221Z PERFORMANCE OF CARDIAC OUTPUT, CONTINUOUS: ICD-10-PCS | Performed by: THORACIC SURGERY (CARDIOTHORACIC VASCULAR SURGERY)

## 2025-01-16 PROCEDURE — 33259 ABLATE ATRIA W/BYPASS ADD-ON: CPT | Performed by: THORACIC SURGERY (CARDIOTHORACIC VASCULAR SURGERY)

## 2025-01-16 PROCEDURE — 85014 HEMATOCRIT: CPT

## 2025-01-16 PROCEDURE — 3700000001 HC ADD 15 MINUTES (ANESTHESIA): Performed by: THORACIC SURGERY (CARDIOTHORACIC VASCULAR SURGERY)

## 2025-01-16 PROCEDURE — 36415 COLL VENOUS BLD VENIPUNCTURE: CPT

## 2025-01-16 PROCEDURE — 2580000003 HC RX 258: Performed by: ANESTHESIOLOGY

## 2025-01-16 PROCEDURE — 2000000000 HC ICU R&B

## 2025-01-16 PROCEDURE — C1729 CATH, DRAINAGE: HCPCS | Performed by: THORACIC SURGERY (CARDIOTHORACIC VASCULAR SURGERY)

## 2025-01-16 PROCEDURE — 3700000000 HC ANESTHESIA ATTENDED CARE: Performed by: THORACIC SURGERY (CARDIOTHORACIC VASCULAR SURGERY)

## 2025-01-16 PROCEDURE — 94761 N-INVAS EAR/PLS OXIMETRY MLT: CPT

## 2025-01-16 PROCEDURE — 3600000008 HC SURGERY OHS BASE: Performed by: THORACIC SURGERY (CARDIOTHORACIC VASCULAR SURGERY)

## 2025-01-16 PROCEDURE — 2580000003 HC RX 258: Performed by: THORACIC SURGERY (CARDIOTHORACIC VASCULAR SURGERY)

## 2025-01-16 PROCEDURE — 85025 COMPLETE CBC W/AUTO DIFF WBC: CPT

## 2025-01-16 PROCEDURE — 33513 CABG VEIN FOUR: CPT | Performed by: THORACIC SURGERY (CARDIOTHORACIC VASCULAR SURGERY)

## 2025-01-16 PROCEDURE — 82330 ASSAY OF CALCIUM: CPT

## 2025-01-16 PROCEDURE — 2709999900 HC NON-CHARGEABLE SUPPLY: Performed by: THORACIC SURGERY (CARDIOTHORACIC VASCULAR SURGERY)

## 2025-01-16 PROCEDURE — 85730 THROMBOPLASTIN TIME PARTIAL: CPT

## 2025-01-16 PROCEDURE — 2500000003 HC RX 250 WO HCPCS

## 2025-01-16 PROCEDURE — 2100000000 HC CCU R&B

## 2025-01-16 PROCEDURE — 33866 AORTIC HEMIARCH GRAFT: CPT | Performed by: THORACIC SURGERY (CARDIOTHORACIC VASCULAR SURGERY)

## 2025-01-16 PROCEDURE — 6360000002 HC RX W HCPCS: Performed by: ANESTHESIOLOGY

## 2025-01-16 PROCEDURE — 85347 COAGULATION TIME ACTIVATED: CPT

## 2025-01-16 PROCEDURE — 2700000000 HC OXYGEN THERAPY PER DAY

## 2025-01-16 PROCEDURE — 33405 REPLACEMENT AORTIC VALVE OPN: CPT | Performed by: THORACIC SURGERY (CARDIOTHORACIC VASCULAR SURGERY)

## 2025-01-16 PROCEDURE — 6370000000 HC RX 637 (ALT 250 FOR IP): Performed by: STUDENT IN AN ORGANIZED HEALTH CARE EDUCATION/TRAINING PROGRAM

## 2025-01-16 PROCEDURE — C1768 GRAFT, VASCULAR: HCPCS | Performed by: THORACIC SURGERY (CARDIOTHORACIC VASCULAR SURGERY)

## 2025-01-16 PROCEDURE — 94002 VENT MGMT INPAT INIT DAY: CPT

## 2025-01-16 PROCEDURE — 2580000003 HC RX 258

## 2025-01-16 PROCEDURE — 33508 ENDOSCOPIC VEIN HARVEST: CPT | Performed by: THORACIC SURGERY (CARDIOTHORACIC VASCULAR SURGERY)

## 2025-01-16 PROCEDURE — 2720000010 HC SURG SUPPLY STERILE: Performed by: THORACIC SURGERY (CARDIOTHORACIC VASCULAR SURGERY)

## 2025-01-16 PROCEDURE — 7100000000 HC PACU RECOVERY - FIRST 15 MIN

## 2025-01-16 PROCEDURE — 80048 BASIC METABOLIC PNL TOTAL CA: CPT

## 2025-01-16 PROCEDURE — 85384 FIBRINOGEN ACTIVITY: CPT

## 2025-01-16 PROCEDURE — 02L70CK OCCLUSION OF LEFT ATRIAL APPENDAGE WITH EXTRALUMINAL DEVICE, OPEN APPROACH: ICD-10-PCS | Performed by: THORACIC SURGERY (CARDIOTHORACIC VASCULAR SURGERY)

## 2025-01-16 PROCEDURE — 84132 ASSAY OF SERUM POTASSIUM: CPT

## 2025-01-16 PROCEDURE — 83735 ASSAY OF MAGNESIUM: CPT

## 2025-01-16 PROCEDURE — 71045 X-RAY EXAM CHEST 1 VIEW: CPT

## 2025-01-16 PROCEDURE — 06BP4ZZ EXCISION OF RIGHT SAPHENOUS VEIN, PERCUTANEOUS ENDOSCOPIC APPROACH: ICD-10-PCS | Performed by: THORACIC SURGERY (CARDIOTHORACIC VASCULAR SURGERY)

## 2025-01-16 PROCEDURE — 6370000000 HC RX 637 (ALT 250 FOR IP): Performed by: THORACIC SURGERY (CARDIOTHORACIC VASCULAR SURGERY)

## 2025-01-16 PROCEDURE — 3600000018 HC SURGERY OHS ADDTL 15MIN: Performed by: THORACIC SURGERY (CARDIOTHORACIC VASCULAR SURGERY)

## 2025-01-16 PROCEDURE — C1713 ANCHOR/SCREW BN/BN,TIS/BN: HCPCS | Performed by: THORACIC SURGERY (CARDIOTHORACIC VASCULAR SURGERY)

## 2025-01-16 PROCEDURE — 33858 AS-AORT GRF F/AORTIC DSJ: CPT | Performed by: THORACIC SURGERY (CARDIOTHORACIC VASCULAR SURGERY)

## 2025-01-16 PROCEDURE — 85610 PROTHROMBIN TIME: CPT

## 2025-01-16 PROCEDURE — 2500000003 HC RX 250 WO HCPCS: Performed by: ANESTHESIOLOGY

## 2025-01-16 PROCEDURE — 85027 COMPLETE CBC AUTOMATED: CPT

## 2025-01-16 PROCEDURE — 88305 TISSUE EXAM BY PATHOLOGIST: CPT

## 2025-01-16 PROCEDURE — 02583ZZ DESTRUCTION OF CONDUCTION MECHANISM, PERCUTANEOUS APPROACH: ICD-10-PCS | Performed by: THORACIC SURGERY (CARDIOTHORACIC VASCULAR SURGERY)

## 2025-01-16 PROCEDURE — 93971 EXTREMITY STUDY: CPT | Performed by: SURGERY

## 2025-01-16 PROCEDURE — B24BZZ4 ULTRASONOGRAPHY OF HEART WITH AORTA, TRANSESOPHAGEAL: ICD-10-PCS | Performed by: THORACIC SURGERY (CARDIOTHORACIC VASCULAR SURGERY)

## 2025-01-16 PROCEDURE — 83605 ASSAY OF LACTIC ACID: CPT

## 2025-01-16 PROCEDURE — 7100000001 HC PACU RECOVERY - ADDTL 15 MIN

## 2025-01-16 PROCEDURE — P9045 ALBUMIN (HUMAN), 5%, 250 ML: HCPCS

## 2025-01-16 PROCEDURE — 84100 ASSAY OF PHOSPHORUS: CPT

## 2025-01-16 PROCEDURE — 02RF08Z REPLACEMENT OF AORTIC VALVE WITH ZOOPLASTIC TISSUE, OPEN APPROACH: ICD-10-PCS | Performed by: THORACIC SURGERY (CARDIOTHORACIC VASCULAR SURGERY)

## 2025-01-16 DEVICE — DEVICE LAA EXCLUSION CLP L 40 MM NIT SPRING POLYESTER CVR: Type: IMPLANTABLE DEVICE | Status: FUNCTIONAL

## 2025-01-16 DEVICE — CABLE STRNL TAPR 3 BLNT 1 CUT EDGE NDL SS: Type: IMPLANTABLE DEVICE | Site: CHEST | Status: FUNCTIONAL

## 2025-01-16 DEVICE — MAX STENTED TISSUE VALVE
Type: IMPLANTABLE DEVICE | Site: AORTIC VALVE | Status: FUNCTIONAL
Brand: EPIC™

## 2025-01-16 DEVICE — TEMP PACING WIRE
Type: IMPLANTABLE DEVICE | Site: HEART | Status: FUNCTIONAL
Brand: MYO/WIRE

## 2025-01-16 DEVICE — IMPLANTABLE DEVICE: Type: IMPLANTABLE DEVICE | Status: FUNCTIONAL

## 2025-01-16 DEVICE — CLIP INT L ORNG TI TRNSVRS GRV CHEVRON SHP W/ PRECIS TIP TO: Type: IMPLANTABLE DEVICE | Status: FUNCTIONAL

## 2025-01-16 RX ORDER — PROPOFOL 10 MG/ML
5-50 INJECTION, EMULSION INTRAVENOUS CONTINUOUS PRN
Status: DISCONTINUED | OUTPATIENT
Start: 2025-01-16 | End: 2025-01-22 | Stop reason: HOSPADM

## 2025-01-16 RX ORDER — ONDANSETRON 4 MG/1
4 TABLET, ORALLY DISINTEGRATING ORAL EVERY 8 HOURS PRN
Status: DISCONTINUED | OUTPATIENT
Start: 2025-01-16 | End: 2025-01-22 | Stop reason: HOSPADM

## 2025-01-16 RX ORDER — CALCIUM CHLORIDE 100 MG/ML
INJECTION INTRAVENOUS; INTRAVENTRICULAR
Status: DISCONTINUED | OUTPATIENT
Start: 2025-01-16 | End: 2025-01-16 | Stop reason: SDUPTHER

## 2025-01-16 RX ORDER — ALBUMIN HUMAN 50 G/1000ML
SOLUTION INTRAVENOUS
Status: DISPENSED
Start: 2025-01-16 | End: 2025-01-16

## 2025-01-16 RX ORDER — VANCOMYCIN HYDROCHLORIDE 1 G/20ML
INJECTION, POWDER, LYOPHILIZED, FOR SOLUTION INTRAVENOUS
Status: DISCONTINUED | OUTPATIENT
Start: 2025-01-16 | End: 2025-01-16 | Stop reason: SDUPTHER

## 2025-01-16 RX ORDER — NOREPINEPHRINE BITARTRATE 0.06 MG/ML
.01-3.3 INJECTION, SOLUTION INTRAVENOUS CONTINUOUS PRN
Status: DISCONTINUED | OUTPATIENT
Start: 2025-01-16 | End: 2025-01-22 | Stop reason: HOSPADM

## 2025-01-16 RX ORDER — SENNA AND DOCUSATE SODIUM 50; 8.6 MG/1; MG/1
1 TABLET, FILM COATED ORAL 2 TIMES DAILY
Status: DISCONTINUED | OUTPATIENT
Start: 2025-01-17 | End: 2025-01-22 | Stop reason: HOSPADM

## 2025-01-16 RX ORDER — POTASSIUM CHLORIDE 1500 MG/1
40 TABLET, EXTENDED RELEASE ORAL PRN
Status: DISCONTINUED | OUTPATIENT
Start: 2025-01-17 | End: 2025-01-22 | Stop reason: HOSPADM

## 2025-01-16 RX ORDER — FAMOTIDINE 10 MG/ML
INJECTION, SOLUTION INTRAVENOUS
Status: DISCONTINUED | OUTPATIENT
Start: 2025-01-16 | End: 2025-01-16 | Stop reason: SDUPTHER

## 2025-01-16 RX ORDER — CEFAZOLIN SODIUM 1 G/3ML
INJECTION, POWDER, FOR SOLUTION INTRAMUSCULAR; INTRAVENOUS
Status: DISCONTINUED | OUTPATIENT
Start: 2025-01-16 | End: 2025-01-16 | Stop reason: SDUPTHER

## 2025-01-16 RX ORDER — FUROSEMIDE 10 MG/ML
INJECTION INTRAMUSCULAR; INTRAVENOUS
Status: DISCONTINUED | OUTPATIENT
Start: 2025-01-16 | End: 2025-01-16 | Stop reason: SDUPTHER

## 2025-01-16 RX ORDER — DOBUTAMINE HYDROCHLORIDE 200 MG/100ML
2-10 INJECTION INTRAVENOUS CONTINUOUS PRN
Status: DISCONTINUED | OUTPATIENT
Start: 2025-01-16 | End: 2025-01-22 | Stop reason: HOSPADM

## 2025-01-16 RX ORDER — OXYCODONE HYDROCHLORIDE 5 MG/1
10 TABLET ORAL EVERY 4 HOURS PRN
Status: DISCONTINUED | OUTPATIENT
Start: 2025-01-16 | End: 2025-01-22 | Stop reason: HOSPADM

## 2025-01-16 RX ORDER — MILRINONE LACTATE 0.2 MG/ML
.1-.75 INJECTION, SOLUTION INTRAVENOUS CONTINUOUS PRN
Status: DISCONTINUED | OUTPATIENT
Start: 2025-01-16 | End: 2025-01-22 | Stop reason: HOSPADM

## 2025-01-16 RX ORDER — MAGNESIUM SULFATE IN WATER 40 MG/ML
2000 INJECTION, SOLUTION INTRAVENOUS PRN
Status: DISCONTINUED | OUTPATIENT
Start: 2025-01-16 | End: 2025-01-22 | Stop reason: HOSPADM

## 2025-01-16 RX ORDER — AMIODARONE HYDROCHLORIDE 150 MG/3ML
INJECTION, SOLUTION INTRAVENOUS
Status: DISCONTINUED | OUTPATIENT
Start: 2025-01-16 | End: 2025-01-16 | Stop reason: SDUPTHER

## 2025-01-16 RX ORDER — ASPIRIN 81 MG/1
81 TABLET ORAL DAILY
Status: DISCONTINUED | OUTPATIENT
Start: 2025-01-16 | End: 2025-01-22 | Stop reason: HOSPADM

## 2025-01-16 RX ORDER — HEPARIN SODIUM 1000 [USP'U]/ML
INJECTION, SOLUTION INTRAVENOUS; SUBCUTANEOUS
Status: DISCONTINUED | OUTPATIENT
Start: 2025-01-16 | End: 2025-01-16 | Stop reason: SDUPTHER

## 2025-01-16 RX ORDER — BISACODYL 10 MG
10 SUPPOSITORY, RECTAL RECTAL EVERY 6 HOURS PRN
Status: DISCONTINUED | OUTPATIENT
Start: 2025-01-16 | End: 2025-01-22

## 2025-01-16 RX ORDER — POTASSIUM CHLORIDE 29.8 MG/ML
20 INJECTION INTRAVENOUS PRN
Status: DISCONTINUED | OUTPATIENT
Start: 2025-01-16 | End: 2025-01-22 | Stop reason: HOSPADM

## 2025-01-16 RX ORDER — LIDOCAINE HYDROCHLORIDE 20 MG/ML
INJECTION, SOLUTION INTRAVENOUS
Status: DISCONTINUED | OUTPATIENT
Start: 2025-01-16 | End: 2025-01-16 | Stop reason: SDUPTHER

## 2025-01-16 RX ORDER — DIPHENHYDRAMINE HCL 25 MG
12.5 TABLET ORAL NIGHTLY PRN
Status: DISCONTINUED | OUTPATIENT
Start: 2025-01-16 | End: 2025-01-22 | Stop reason: HOSPADM

## 2025-01-16 RX ORDER — MUPIROCIN 20 MG/G
OINTMENT TOPICAL 2 TIMES DAILY
Status: COMPLETED | OUTPATIENT
Start: 2025-01-16 | End: 2025-01-19

## 2025-01-16 RX ORDER — COLCHICINE 0.6 MG/1
0.3 TABLET ORAL 2 TIMES DAILY
Status: DISCONTINUED | OUTPATIENT
Start: 2025-01-17 | End: 2025-01-19 | Stop reason: ALTCHOICE

## 2025-01-16 RX ORDER — SUCCINYLCHOLINE/SOD CL,ISO/PF 200MG/10ML
SYRINGE (ML) INTRAVENOUS
Status: DISCONTINUED | OUTPATIENT
Start: 2025-01-16 | End: 2025-01-16 | Stop reason: SDUPTHER

## 2025-01-16 RX ORDER — PROPOFOL 10 MG/ML
INJECTION, EMULSION INTRAVENOUS
Status: DISCONTINUED | OUTPATIENT
Start: 2025-01-16 | End: 2025-01-16 | Stop reason: SDUPTHER

## 2025-01-16 RX ORDER — FENTANYL CITRATE 50 UG/ML
INJECTION, SOLUTION INTRAMUSCULAR; INTRAVENOUS
Status: DISCONTINUED | OUTPATIENT
Start: 2025-01-16 | End: 2025-01-16 | Stop reason: SDUPTHER

## 2025-01-16 RX ORDER — SODIUM CHLORIDE 0.9 % (FLUSH) 0.9 %
5-40 SYRINGE (ML) INJECTION PRN
Status: DISCONTINUED | OUTPATIENT
Start: 2025-01-16 | End: 2025-01-22 | Stop reason: HOSPADM

## 2025-01-16 RX ORDER — OXYCODONE HYDROCHLORIDE 5 MG/1
5 TABLET ORAL EVERY 4 HOURS PRN
Status: DISCONTINUED | OUTPATIENT
Start: 2025-01-16 | End: 2025-01-22 | Stop reason: HOSPADM

## 2025-01-16 RX ORDER — HYDROMORPHONE HYDROCHLORIDE 1 MG/ML
0.5 INJECTION, SOLUTION INTRAMUSCULAR; INTRAVENOUS; SUBCUTANEOUS
Status: DISCONTINUED | OUTPATIENT
Start: 2025-01-16 | End: 2025-01-17

## 2025-01-16 RX ORDER — HEPARIN SODIUM 5000 [USP'U]/ML
5000 INJECTION, SOLUTION INTRAVENOUS; SUBCUTANEOUS EVERY 8 HOURS SCHEDULED
Status: DISCONTINUED | OUTPATIENT
Start: 2025-01-17 | End: 2025-01-17

## 2025-01-16 RX ORDER — POLYETHYLENE GLYCOL 3350 17 G/17G
17 POWDER, FOR SOLUTION ORAL DAILY
Status: DISCONTINUED | OUTPATIENT
Start: 2025-01-17 | End: 2025-01-22 | Stop reason: HOSPADM

## 2025-01-16 RX ORDER — MAGNESIUM SULFATE HEPTAHYDRATE 500 MG/ML
INJECTION, SOLUTION INTRAMUSCULAR; INTRAVENOUS
Status: DISCONTINUED | OUTPATIENT
Start: 2025-01-16 | End: 2025-01-16 | Stop reason: SDUPTHER

## 2025-01-16 RX ORDER — MAGNESIUM HYDROXIDE 1200 MG/15ML
LIQUID ORAL CONTINUOUS PRN
Status: COMPLETED | OUTPATIENT
Start: 2025-01-16 | End: 2025-01-16

## 2025-01-16 RX ORDER — ASPIRIN 300 MG/1
300 SUPPOSITORY RECTAL
Status: ACTIVE | OUTPATIENT
Start: 2025-01-16 | End: 2025-01-17

## 2025-01-16 RX ORDER — TAMSULOSIN HYDROCHLORIDE 0.4 MG/1
0.4 CAPSULE ORAL DAILY
Status: DISCONTINUED | OUTPATIENT
Start: 2025-01-16 | End: 2025-01-22 | Stop reason: HOSPADM

## 2025-01-16 RX ORDER — ALBUMIN HUMAN 50 G/1000ML
25 SOLUTION INTRAVENOUS PRN
Status: DISCONTINUED | OUTPATIENT
Start: 2025-01-16 | End: 2025-01-22 | Stop reason: HOSPADM

## 2025-01-16 RX ORDER — METOPROLOL TARTRATE 1 MG/ML
INJECTION, SOLUTION INTRAVENOUS
Status: DISCONTINUED | OUTPATIENT
Start: 2025-01-16 | End: 2025-01-16 | Stop reason: SDUPTHER

## 2025-01-16 RX ORDER — ONDANSETRON 2 MG/ML
4 INJECTION INTRAMUSCULAR; INTRAVENOUS EVERY 6 HOURS PRN
Status: DISCONTINUED | OUTPATIENT
Start: 2025-01-16 | End: 2025-01-22 | Stop reason: HOSPADM

## 2025-01-16 RX ORDER — SODIUM CHLORIDE 9 MG/ML
INJECTION, SOLUTION INTRAVENOUS PRN
Status: DISCONTINUED | OUTPATIENT
Start: 2025-01-16 | End: 2025-01-22 | Stop reason: HOSPADM

## 2025-01-16 RX ORDER — GLUCAGON 1 MG/ML
1 KIT INJECTION PRN
Status: DISCONTINUED | OUTPATIENT
Start: 2025-01-16 | End: 2025-01-22 | Stop reason: HOSPADM

## 2025-01-16 RX ORDER — ALBUTEROL SULFATE 0.83 MG/ML
2.5 SOLUTION RESPIRATORY (INHALATION)
Status: DISCONTINUED | OUTPATIENT
Start: 2025-01-17 | End: 2025-01-22 | Stop reason: HOSPADM

## 2025-01-16 RX ORDER — DEXMEDETOMIDINE HYDROCHLORIDE 4 UG/ML
.1-1.5 INJECTION, SOLUTION INTRAVENOUS CONTINUOUS PRN
Status: DISCONTINUED | OUTPATIENT
Start: 2025-01-16 | End: 2025-01-22 | Stop reason: HOSPADM

## 2025-01-16 RX ORDER — SODIUM CHLORIDE, SODIUM LACTATE, POTASSIUM CHLORIDE, CALCIUM CHLORIDE 600; 310; 30; 20 MG/100ML; MG/100ML; MG/100ML; MG/100ML
INJECTION, SOLUTION INTRAVENOUS
Status: DISCONTINUED | OUTPATIENT
Start: 2025-01-16 | End: 2025-01-16 | Stop reason: SDUPTHER

## 2025-01-16 RX ORDER — ACETAMINOPHEN 500 MG
1000 TABLET ORAL EVERY 8 HOURS
Status: DISCONTINUED | OUTPATIENT
Start: 2025-01-16 | End: 2025-01-22 | Stop reason: HOSPADM

## 2025-01-16 RX ORDER — LIDOCAINE HYDROCHLORIDE ANHYDROUS AND DEXTROSE MONOHYDRATE 5; 400 G/100ML; MG/100ML
0.5 INJECTION, SOLUTION INTRAVENOUS CONTINUOUS
Status: DISCONTINUED | OUTPATIENT
Start: 2025-01-16 | End: 2025-01-22

## 2025-01-16 RX ORDER — HYDROMORPHONE HYDROCHLORIDE 1 MG/ML
0.25 INJECTION, SOLUTION INTRAMUSCULAR; INTRAVENOUS; SUBCUTANEOUS
Status: DISCONTINUED | OUTPATIENT
Start: 2025-01-16 | End: 2025-01-17

## 2025-01-16 RX ORDER — PANTOPRAZOLE SODIUM 40 MG/1
40 TABLET, DELAYED RELEASE ORAL DAILY
Status: DISCONTINUED | OUTPATIENT
Start: 2025-01-16 | End: 2025-01-22 | Stop reason: HOSPADM

## 2025-01-16 RX ORDER — ONDANSETRON 2 MG/ML
INJECTION INTRAMUSCULAR; INTRAVENOUS
Status: DISCONTINUED | OUTPATIENT
Start: 2025-01-16 | End: 2025-01-16 | Stop reason: SDUPTHER

## 2025-01-16 RX ORDER — MIDAZOLAM HYDROCHLORIDE 5 MG/ML
INJECTION INTRAMUSCULAR; INTRAVENOUS
Status: DISCONTINUED | OUTPATIENT
Start: 2025-01-16 | End: 2025-01-16 | Stop reason: SDUPTHER

## 2025-01-16 RX ORDER — VECURONIUM BROMIDE 1 MG/ML
INJECTION, POWDER, LYOPHILIZED, FOR SOLUTION INTRAVENOUS
Status: DISCONTINUED | OUTPATIENT
Start: 2025-01-16 | End: 2025-01-16 | Stop reason: SDUPTHER

## 2025-01-16 RX ORDER — DEXTROSE MONOHYDRATE 100 MG/ML
INJECTION, SOLUTION INTRAVENOUS CONTINUOUS PRN
Status: DISCONTINUED | OUTPATIENT
Start: 2025-01-16 | End: 2025-01-22 | Stop reason: HOSPADM

## 2025-01-16 RX ORDER — DOBUTAMINE 12.5 MG/ML
INJECTION, SOLUTION, CONCENTRATE INTRAVENOUS
Status: DISCONTINUED | OUTPATIENT
Start: 2025-01-16 | End: 2025-01-16 | Stop reason: SDUPTHER

## 2025-01-16 RX ORDER — ATORVASTATIN CALCIUM 80 MG/1
80 TABLET, FILM COATED ORAL NIGHTLY
Status: DISCONTINUED | OUTPATIENT
Start: 2025-01-17 | End: 2025-01-22 | Stop reason: HOSPADM

## 2025-01-16 RX ORDER — SODIUM CHLORIDE 0.9 % (FLUSH) 0.9 %
5-40 SYRINGE (ML) INJECTION EVERY 12 HOURS SCHEDULED
Status: DISCONTINUED | OUTPATIENT
Start: 2025-01-16 | End: 2025-01-22 | Stop reason: HOSPADM

## 2025-01-16 RX ORDER — SODIUM CHLORIDE, SODIUM LACTATE, POTASSIUM CHLORIDE, CALCIUM CHLORIDE 600; 310; 30; 20 MG/100ML; MG/100ML; MG/100ML; MG/100ML
INJECTION, SOLUTION INTRAVENOUS CONTINUOUS
Status: DISCONTINUED | OUTPATIENT
Start: 2025-01-16 | End: 2025-01-22

## 2025-01-16 RX ORDER — NITROGLYCERIN 20 MG/100ML
5-200 INJECTION INTRAVENOUS CONTINUOUS
Status: DISCONTINUED | OUTPATIENT
Start: 2025-01-16 | End: 2025-01-16

## 2025-01-16 RX ADMIN — INSULIN HUMAN 2.4 UNITS/HR: 1 INJECTION, SOLUTION INTRAVENOUS at 20:40

## 2025-01-16 RX ADMIN — MIDAZOLAM 4 MG: 5 INJECTION INTRAMUSCULAR; INTRAVENOUS at 16:24

## 2025-01-16 RX ADMIN — VECURONIUM BROMIDE 10 MG: 1 INJECTION, POWDER, LYOPHILIZED, FOR SOLUTION INTRAVENOUS at 12:05

## 2025-01-16 RX ADMIN — LIDOCAINE HYDROCHLORIDE 100 MG: 20 INJECTION, SOLUTION INTRAVENOUS at 18:38

## 2025-01-16 RX ADMIN — SODIUM CHLORIDE: 900 INJECTION, SOLUTION INTRAVENOUS at 20:53

## 2025-01-16 RX ADMIN — PROPOFOL 100 MG: 10 INJECTION, EMULSION INTRAVENOUS at 14:58

## 2025-01-16 RX ADMIN — MAGNESIUM SULFATE HEPTAHYDRATE 1 G: 500 INJECTION, SOLUTION INTRAMUSCULAR; INTRAVENOUS at 15:02

## 2025-01-16 RX ADMIN — CEFAZOLIN 3 G: 1 INJECTION, POWDER, FOR SOLUTION INTRAMUSCULAR; INTRAVENOUS at 19:54

## 2025-01-16 RX ADMIN — ONDANSETRON 4 MG: 2 INJECTION INTRAMUSCULAR; INTRAVENOUS at 17:08

## 2025-01-16 RX ADMIN — HEPARIN SODIUM 31000 UNITS: 1000 INJECTION INTRAVENOUS; SUBCUTANEOUS at 14:17

## 2025-01-16 RX ADMIN — SODIUM CHLORIDE, PRESERVATIVE FREE 10 ML: 5 INJECTION INTRAVENOUS at 08:43

## 2025-01-16 RX ADMIN — PROPOFOL 150 MG: 10 INJECTION, EMULSION INTRAVENOUS at 11:59

## 2025-01-16 RX ADMIN — PROPOFOL 100 MG: 10 INJECTION, EMULSION INTRAVENOUS at 14:50

## 2025-01-16 RX ADMIN — Medication 0.06 MCG/KG/MIN: at 20:56

## 2025-01-16 RX ADMIN — MIDAZOLAM 1 MG: 5 INJECTION INTRAMUSCULAR; INTRAVENOUS at 11:55

## 2025-01-16 RX ADMIN — VASOPRESSIN 0.04 UNITS/MIN: 20 INJECTION INTRAVENOUS at 15:09

## 2025-01-16 RX ADMIN — FENTANYL CITRATE 150 MCG: 50 INJECTION, SOLUTION INTRAMUSCULAR; INTRAVENOUS at 11:59

## 2025-01-16 RX ADMIN — FENTANYL CITRATE 50 MCG: 50 INJECTION, SOLUTION INTRAMUSCULAR; INTRAVENOUS at 11:52

## 2025-01-16 RX ADMIN — FAMOTIDINE 20 MG: 10 INJECTION, SOLUTION INTRAVENOUS at 13:13

## 2025-01-16 RX ADMIN — METHYLPREDNISOLONE SODIUM SUCCINATE 1000 MG: 1 INJECTION INTRAMUSCULAR; INTRAVENOUS at 13:41

## 2025-01-16 RX ADMIN — INSULIN HUMAN 2 UNITS/HR: 1 INJECTION, SOLUTION INTRAVENOUS at 12:11

## 2025-01-16 RX ADMIN — LIDOCAINE HYDROCHLORIDE 100 MG: 20 INJECTION, SOLUTION INTRAVENOUS at 18:31

## 2025-01-16 RX ADMIN — PROPOFOL 100 MG: 10 INJECTION, EMULSION INTRAVENOUS at 14:46

## 2025-01-16 RX ADMIN — AMINOCAPROIC ACID 5 MG/HR: 250 INJECTION, SOLUTION INTRAVENOUS at 11:55

## 2025-01-16 RX ADMIN — ALBUMIN (HUMAN) 12.5 G: 12.5 INJECTION, SOLUTION INTRAVENOUS at 20:45

## 2025-01-16 RX ADMIN — LIDOCAINE HYDROCHLORIDE 100 MG: 20 INJECTION, SOLUTION INTRAVENOUS at 11:55

## 2025-01-16 RX ADMIN — CHLORHEXIDINE GLUCONATE, 0.12% ORAL RINSE 15 ML: 1.2 SOLUTION DENTAL at 06:18

## 2025-01-16 RX ADMIN — VASOPRESSIN 0.04 UNITS/MIN: 20 INJECTION INTRAVENOUS at 20:55

## 2025-01-16 RX ADMIN — Medication 50 MG: at 14:38

## 2025-01-16 RX ADMIN — SODIUM CHLORIDE 3000 MG: 900 INJECTION INTRAVENOUS at 23:48

## 2025-01-16 RX ADMIN — SODIUM BICARBONATE 50 MEQ: 84 INJECTION INTRAVENOUS at 17:08

## 2025-01-16 RX ADMIN — PROPOFOL 100 MG: 10 INJECTION, EMULSION INTRAVENOUS at 14:31

## 2025-01-16 RX ADMIN — METOPROLOL TARTRATE 2 MG: 5 INJECTION INTRAVENOUS at 12:59

## 2025-01-16 RX ADMIN — CALCIUM CHLORIDE 0.5 G: 100 INJECTION INTRAVENOUS; INTRAVENTRICULAR at 18:42

## 2025-01-16 RX ADMIN — SODIUM CHLORIDE, PRESERVATIVE FREE 40 MG: 5 INJECTION INTRAVENOUS at 23:00

## 2025-01-16 RX ADMIN — SODIUM CHLORIDE: 900 INJECTION, SOLUTION INTRAVENOUS at 20:51

## 2025-01-16 RX ADMIN — MUPIROCIN: 20 OINTMENT TOPICAL at 08:50

## 2025-01-16 RX ADMIN — FENTANYL CITRATE 150 MCG: 50 INJECTION, SOLUTION INTRAMUSCULAR; INTRAVENOUS at 14:38

## 2025-01-16 RX ADMIN — VANCOMYCIN HYDROCHLORIDE 2000 MG: 1 INJECTION, POWDER, LYOPHILIZED, FOR SOLUTION INTRAVENOUS at 12:00

## 2025-01-16 RX ADMIN — AMIODARONE HYDROCHLORIDE 150 MG: 50 INJECTION, SOLUTION INTRAVENOUS at 15:15

## 2025-01-16 RX ADMIN — NITROGLYCERIN 20 MCG/MIN: 20 INJECTION INTRAVENOUS at 13:11

## 2025-01-16 RX ADMIN — CALCIUM CHLORIDE 0.5 G: 100 INJECTION INTRAVENOUS; INTRAVENTRICULAR at 18:55

## 2025-01-16 RX ADMIN — NOREPINEPHRINE BITARTRATE 0.01 MCG/KG/MIN: 1 INJECTION, SOLUTION, CONCENTRATE INTRAVENOUS at 13:11

## 2025-01-16 RX ADMIN — POTASSIUM CHLORIDE 20 MEQ: 29.8 INJECTION, SOLUTION INTRAVENOUS at 21:09

## 2025-01-16 RX ADMIN — PANTOPRAZOLE SODIUM 40 MG: 40 TABLET, DELAYED RELEASE ORAL at 06:18

## 2025-01-16 RX ADMIN — AMIODARONE HYDROCHLORIDE 150 MG: 50 INJECTION, SOLUTION INTRAVENOUS at 15:21

## 2025-01-16 RX ADMIN — Medication 10 ML: at 23:01

## 2025-01-16 RX ADMIN — Medication 100 MG: at 11:59

## 2025-01-16 RX ADMIN — PROPOFOL 100 MG: 10 INJECTION, EMULSION INTRAVENOUS at 14:42

## 2025-01-16 RX ADMIN — SODIUM CHLORIDE, POTASSIUM CHLORIDE, SODIUM LACTATE AND CALCIUM CHLORIDE: 600; 310; 30; 20 INJECTION, SOLUTION INTRAVENOUS at 11:57

## 2025-01-16 RX ADMIN — VECURONIUM BROMIDE 10 MG: 1 INJECTION, POWDER, LYOPHILIZED, FOR SOLUTION INTRAVENOUS at 14:38

## 2025-01-16 RX ADMIN — DEXMEDETOMIDINE HYDROCHLORIDE 0.4 MCG/KG/HR: 4 INJECTION, SOLUTION INTRAVENOUS at 20:53

## 2025-01-16 RX ADMIN — FUROSEMIDE 40 MG: 10 INJECTION, SOLUTION INTRAMUSCULAR; INTRAVENOUS at 15:02

## 2025-01-16 RX ADMIN — MUPIROCIN: 20 OINTMENT TOPICAL at 23:00

## 2025-01-16 RX ADMIN — SODIUM CHLORIDE, POTASSIUM CHLORIDE, SODIUM LACTATE AND CALCIUM CHLORIDE: 600; 310; 30; 20 INJECTION, SOLUTION INTRAVENOUS at 20:56

## 2025-01-16 RX ADMIN — CEFAZOLIN 3 G: 1 INJECTION, POWDER, FOR SOLUTION INTRAMUSCULAR; INTRAVENOUS at 12:00

## 2025-01-16 RX ADMIN — PROPOFOL 50 MG: 10 INJECTION, EMULSION INTRAVENOUS at 14:40

## 2025-01-16 RX ADMIN — FENTANYL CITRATE 150 MCG: 50 INJECTION, SOLUTION INTRAMUSCULAR; INTRAVENOUS at 16:24

## 2025-01-16 RX ADMIN — LIDOCAINE HYDROCHLORIDE 1 MG/MIN: 4 INJECTION, SOLUTION INTRAVENOUS at 20:50

## 2025-01-16 RX ADMIN — HEPARIN SODIUM 5000 UNITS: 1000 INJECTION INTRAVENOUS; SUBCUTANEOUS at 13:34

## 2025-01-16 RX ADMIN — MIDAZOLAM 3 MG: 5 INJECTION INTRAMUSCULAR; INTRAVENOUS at 14:38

## 2025-01-16 RX ADMIN — CEFAZOLIN 3 G: 1 INJECTION, POWDER, FOR SOLUTION INTRAMUSCULAR; INTRAVENOUS at 16:00

## 2025-01-16 RX ADMIN — MIDAZOLAM 2 MG: 5 INJECTION INTRAMUSCULAR; INTRAVENOUS at 11:42

## 2025-01-16 RX ADMIN — LIDOCAINE HYDROCHLORIDE 1 MG/MIN: 4 INJECTION, SOLUTION INTRAVENOUS at 18:40

## 2025-01-16 RX ADMIN — DOBUTAMINE 0.5 MCG/KG/MIN: 12.5 INJECTION, SOLUTION, CONCENTRATE INTRAVENOUS at 13:04

## 2025-01-16 ASSESSMENT — PAIN SCALES - GENERAL
PAINLEVEL_OUTOF10: 0

## 2025-01-16 ASSESSMENT — PULMONARY FUNCTION TESTS
PIF_VALUE: 32
PIF_VALUE: 30
PIF_VALUE: 28
PIF_VALUE: 32
PIF_VALUE: 31
PIF_VALUE: 31
PIF_VALUE: 36
PIF_VALUE: 28

## 2025-01-16 ASSESSMENT — PAIN - FUNCTIONAL ASSESSMENT: PAIN_FUNCTIONAL_ASSESSMENT: 0-10

## 2025-01-17 ENCOUNTER — APPOINTMENT (OUTPATIENT)
Dept: GENERAL RADIOLOGY | Age: 60
DRG: 216 | End: 2025-01-17
Payer: COMMERCIAL

## 2025-01-17 LAB
ACTIVATED CLOTTING TIME: 135 SEC (ref 99–130)
ACTIVATED CLOTTING TIME: 135 SEC (ref 99–130)
ACTIVATED CLOTTING TIME: 446 SEC (ref 99–130)
ACTIVATED CLOTTING TIME: 458 SEC (ref 99–130)
ACTIVATED CLOTTING TIME: 461 SEC (ref 99–130)
ACTIVATED CLOTTING TIME: 483 SEC (ref 99–130)
ACTIVATED CLOTTING TIME: 511 SEC (ref 99–130)
ACTIVATED CLOTTING TIME: 512 SEC (ref 99–130)
ACTIVATED CLOTTING TIME: 521 SEC (ref 99–130)
ACTIVATED CLOTTING TIME: 540 SEC (ref 99–130)
ACTIVATED CLOTTING TIME: 575 SEC (ref 99–130)
ALBUMIN SERPL-MCNC: 3.5 G/DL (ref 3.4–5)
ALP SERPL-CCNC: 34 U/L (ref 40–129)
ALT SERPL-CCNC: 54 U/L (ref 10–40)
ANION GAP SERPL CALCULATED.3IONS-SCNC: 8 MMOL/L (ref 3–16)
AST SERPL-CCNC: 181 U/L (ref 15–37)
BASE EXCESS BLDA CALC-SCNC: 0 MMOL/L (ref -3–3)
BASE EXCESS BLDA CALC-SCNC: 3 MMOL/L (ref -3–3)
BILIRUB DIRECT SERPL-MCNC: 0.5 MG/DL (ref 0–0.3)
BILIRUB INDIRECT SERPL-MCNC: 0.4 MG/DL (ref 0–1)
BILIRUB SERPL-MCNC: 0.9 MG/DL (ref 0–1)
BUN SERPL-MCNC: 18 MG/DL (ref 7–20)
CA-I BLD-SCNC: 1.25 MMOL/L (ref 1.12–1.32)
CALCIUM SERPL-MCNC: 8.8 MG/DL (ref 8.3–10.6)
CHLORIDE SERPL-SCNC: 112 MMOL/L (ref 99–110)
CO2 BLDA-SCNC: 27 MMOL/L
CO2 BLDA-SCNC: 29 MMOL/L
CO2 SERPL-SCNC: 24 MMOL/L (ref 21–32)
CREAT SERPL-MCNC: 1.3 MG/DL (ref 0.8–1.3)
DEPRECATED RDW RBC AUTO: 13.7 % (ref 12.4–15.4)
EKG ATRIAL RATE: 26 BPM
EKG DIAGNOSIS: NORMAL
EKG Q-T INTERVAL: 482 MS
EKG QRS DURATION: 138 MS
EKG QTC CALCULATION (BAZETT): 356 MS
EKG R AXIS: -53 DEGREES
EKG T AXIS: 97 DEGREES
EKG VENTRICULAR RATE: 33 BPM
FIBRINOGEN PPP-MCNC: 311 MG/DL (ref 227–534)
GFR SERPLBLD CREATININE-BSD FMLA CKD-EPI: 63 ML/MIN/{1.73_M2}
GLUCOSE BLD-MCNC: 120 MG/DL (ref 70–99)
GLUCOSE BLD-MCNC: 121 MG/DL (ref 70–99)
GLUCOSE BLD-MCNC: 121 MG/DL (ref 70–99)
GLUCOSE BLD-MCNC: 122 MG/DL (ref 70–99)
GLUCOSE BLD-MCNC: 123 MG/DL (ref 70–99)
GLUCOSE BLD-MCNC: 128 MG/DL (ref 70–99)
GLUCOSE BLD-MCNC: 131 MG/DL (ref 70–99)
GLUCOSE BLD-MCNC: 134 MG/DL (ref 70–99)
GLUCOSE BLD-MCNC: 134 MG/DL (ref 70–99)
GLUCOSE BLD-MCNC: 135 MG/DL (ref 70–99)
GLUCOSE BLD-MCNC: 137 MG/DL (ref 70–99)
GLUCOSE BLD-MCNC: 142 MG/DL (ref 70–99)
GLUCOSE BLD-MCNC: 147 MG/DL (ref 70–99)
GLUCOSE BLD-MCNC: 150 MG/DL (ref 70–99)
GLUCOSE BLD-MCNC: 155 MG/DL (ref 70–99)
GLUCOSE BLD-MCNC: 156 MG/DL (ref 70–99)
GLUCOSE BLD-MCNC: 161 MG/DL (ref 70–99)
GLUCOSE BLD-MCNC: 174 MG/DL (ref 70–99)
GLUCOSE BLD-MCNC: 199 MG/DL (ref 70–99)
GLUCOSE BLD-MCNC: 200 MG/DL (ref 70–99)
GLUCOSE BLD-MCNC: 215 MG/DL (ref 70–99)
GLUCOSE BLD-MCNC: 236 MG/DL (ref 70–99)
GLUCOSE SERPL-MCNC: 127 MG/DL (ref 70–99)
HCO3 BLDA-SCNC: 25.6 MMOL/L (ref 21–29)
HCO3 BLDA-SCNC: 27.3 MMOL/L (ref 21–29)
HCT VFR BLD AUTO: 35.2 % (ref 40.5–52.5)
HCT VFR BLD AUTO: 39 % (ref 40.5–52.5)
HGB BLD CALC-MCNC: 13.2 GM/DL (ref 13.5–17.5)
HGB BLD-MCNC: 11.9 G/DL (ref 13.5–17.5)
MAGNESIUM SERPL-MCNC: 2.23 MG/DL (ref 1.8–2.4)
MAGNESIUM SERPL-MCNC: 2.56 MG/DL (ref 1.8–2.4)
MCH RBC QN AUTO: 31.3 PG (ref 26–34)
MCHC RBC AUTO-ENTMCNC: 33.9 G/DL (ref 31–36)
MCV RBC AUTO: 92.3 FL (ref 80–100)
PCO2 BLDA: 41.8 MM HG (ref 35–45)
PCO2 BLDA: 43.4 MM HG (ref 35–45)
PERFORMED ON: ABNORMAL
PH BLDA: 7.38 [PH] (ref 7.35–7.45)
PH BLDA: 7.42 [PH] (ref 7.35–7.45)
PLATELET # BLD AUTO: 129 K/UL (ref 135–450)
PMV BLD AUTO: 8.3 FL (ref 5–10.5)
PO2 BLDA: 107.5 MM HG (ref 75–108)
PO2 BLDA: 108.3 MM HG (ref 75–108)
POC SAMPLE TYPE: ABNORMAL
POC SAMPLE TYPE: ABNORMAL
POTASSIUM BLD-SCNC: 4.9 MMOL/L (ref 3.5–5.1)
POTASSIUM SERPL-SCNC: 4.9 MMOL/L (ref 3.5–5.1)
PROT SERPL-MCNC: 5.2 G/DL (ref 6.4–8.2)
RBC # BLD AUTO: 3.81 M/UL (ref 4.2–5.9)
SAO2 % BLDA: 98 % (ref 93–100)
SAO2 % BLDA: 98 % (ref 93–100)
SODIUM BLD-SCNC: 146 MMOL/L (ref 136–145)
SODIUM SERPL-SCNC: 144 MMOL/L (ref 136–145)
WBC # BLD AUTO: 14.5 K/UL (ref 4–11)

## 2025-01-17 PROCEDURE — 94375 RESPIRATORY FLOW VOLUME LOOP: CPT | Performed by: INTERNAL MEDICINE

## 2025-01-17 PROCEDURE — 5A1935Z RESPIRATORY VENTILATION, LESS THAN 24 CONSECUTIVE HOURS: ICD-10-PCS | Performed by: THORACIC SURGERY (CARDIOTHORACIC VASCULAR SURGERY)

## 2025-01-17 PROCEDURE — 6360000002 HC RX W HCPCS

## 2025-01-17 PROCEDURE — 2580000003 HC RX 258

## 2025-01-17 PROCEDURE — 85384 FIBRINOGEN ACTIVITY: CPT

## 2025-01-17 PROCEDURE — 80076 HEPATIC FUNCTION PANEL: CPT

## 2025-01-17 PROCEDURE — 80048 BASIC METABOLIC PNL TOTAL CA: CPT

## 2025-01-17 PROCEDURE — 6370000000 HC RX 637 (ALT 250 FOR IP): Performed by: THORACIC SURGERY (CARDIOTHORACIC VASCULAR SURGERY)

## 2025-01-17 PROCEDURE — P9045 ALBUMIN (HUMAN), 5%, 250 ML: HCPCS

## 2025-01-17 PROCEDURE — 82803 BLOOD GASES ANY COMBINATION: CPT

## 2025-01-17 PROCEDURE — 93005 ELECTROCARDIOGRAM TRACING: CPT

## 2025-01-17 PROCEDURE — 97530 THERAPEUTIC ACTIVITIES: CPT

## 2025-01-17 PROCEDURE — 2500000003 HC RX 250 WO HCPCS

## 2025-01-17 PROCEDURE — 97165 OT EVAL LOW COMPLEX 30 MIN: CPT

## 2025-01-17 PROCEDURE — 71045 X-RAY EXAM CHEST 1 VIEW: CPT

## 2025-01-17 PROCEDURE — 85027 COMPLETE CBC AUTOMATED: CPT

## 2025-01-17 PROCEDURE — 2000000000 HC ICU R&B

## 2025-01-17 PROCEDURE — P9047 ALBUMIN (HUMAN), 25%, 50ML: HCPCS

## 2025-01-17 PROCEDURE — 99024 POSTOP FOLLOW-UP VISIT: CPT

## 2025-01-17 PROCEDURE — 6360000002 HC RX W HCPCS: Performed by: ANESTHESIOLOGY

## 2025-01-17 PROCEDURE — 93010 ELECTROCARDIOGRAM REPORT: CPT | Performed by: INTERNAL MEDICINE

## 2025-01-17 PROCEDURE — 94640 AIRWAY INHALATION TREATMENT: CPT

## 2025-01-17 PROCEDURE — 83735 ASSAY OF MAGNESIUM: CPT

## 2025-01-17 PROCEDURE — 94669 MECHANICAL CHEST WALL OSCILL: CPT

## 2025-01-17 PROCEDURE — 6370000000 HC RX 637 (ALT 250 FOR IP)

## 2025-01-17 PROCEDURE — 97166 OT EVAL MOD COMPLEX 45 MIN: CPT

## 2025-01-17 PROCEDURE — 2580000003 HC RX 258: Performed by: ANESTHESIOLOGY

## 2025-01-17 PROCEDURE — 2700000000 HC OXYGEN THERAPY PER DAY

## 2025-01-17 PROCEDURE — 6360000002 HC RX W HCPCS: Performed by: THORACIC SURGERY (CARDIOTHORACIC VASCULAR SURGERY)

## 2025-01-17 PROCEDURE — 97162 PT EVAL MOD COMPLEX 30 MIN: CPT

## 2025-01-17 PROCEDURE — 97535 SELF CARE MNGMENT TRAINING: CPT

## 2025-01-17 PROCEDURE — 94761 N-INVAS EAR/PLS OXIMETRY MLT: CPT

## 2025-01-17 PROCEDURE — 82947 ASSAY GLUCOSE BLOOD QUANT: CPT

## 2025-01-17 RX ORDER — HYDROMORPHONE HYDROCHLORIDE 1 MG/ML
0.25 INJECTION, SOLUTION INTRAMUSCULAR; INTRAVENOUS; SUBCUTANEOUS
Status: DISCONTINUED | OUTPATIENT
Start: 2025-01-17 | End: 2025-01-20

## 2025-01-17 RX ORDER — AMIODARONE HYDROCHLORIDE 200 MG/1
400 TABLET ORAL 3 TIMES DAILY
Status: COMPLETED | OUTPATIENT
Start: 2025-01-17 | End: 2025-01-22

## 2025-01-17 RX ORDER — AMIODARONE HYDROCHLORIDE 200 MG/1
400 TABLET ORAL 3 TIMES DAILY
Status: DISCONTINUED | OUTPATIENT
Start: 2025-01-17 | End: 2025-01-17

## 2025-01-17 RX ORDER — HEPARIN SODIUM 5000 [USP'U]/ML
INJECTION, SOLUTION INTRAVENOUS; SUBCUTANEOUS
Status: COMPLETED
Start: 2025-01-17 | End: 2025-01-17

## 2025-01-17 RX ORDER — HYDROMORPHONE HYDROCHLORIDE 1 MG/ML
0.5 INJECTION, SOLUTION INTRAMUSCULAR; INTRAVENOUS; SUBCUTANEOUS
Status: DISCONTINUED | OUTPATIENT
Start: 2025-01-17 | End: 2025-01-20

## 2025-01-17 RX ORDER — SODIUM CHLORIDE 9 MG/ML
INJECTION, SOLUTION INTRAVENOUS
Status: DISPENSED
Start: 2025-01-17 | End: 2025-01-18

## 2025-01-17 RX ORDER — INSULIN LISPRO 100 [IU]/ML
0-18 INJECTION, SOLUTION INTRAVENOUS; SUBCUTANEOUS
Status: DISCONTINUED | OUTPATIENT
Start: 2025-01-17 | End: 2025-01-22 | Stop reason: HOSPADM

## 2025-01-17 RX ORDER — INSULIN GLARGINE 100 [IU]/ML
30 INJECTION, SOLUTION SUBCUTANEOUS DAILY
Status: DISCONTINUED | OUTPATIENT
Start: 2025-01-17 | End: 2025-01-19 | Stop reason: ALTCHOICE

## 2025-01-17 RX ORDER — HEPARIN SODIUM 5000 [USP'U]/ML
7500 INJECTION, SOLUTION INTRAVENOUS; SUBCUTANEOUS EVERY 8 HOURS SCHEDULED
Status: DISCONTINUED | OUTPATIENT
Start: 2025-01-17 | End: 2025-01-22 | Stop reason: HOSPADM

## 2025-01-17 RX ORDER — FUROSEMIDE 10 MG/ML
20 INJECTION INTRAMUSCULAR; INTRAVENOUS ONCE
Status: COMPLETED | OUTPATIENT
Start: 2025-01-17 | End: 2025-01-17

## 2025-01-17 RX ADMIN — COLCHICINE 0.3 MG: 0.6 TABLET ORAL at 08:43

## 2025-01-17 RX ADMIN — HEPARIN SODIUM 5000 UNITS: 5000 INJECTION INTRAVENOUS; SUBCUTANEOUS at 08:49

## 2025-01-17 RX ADMIN — OXYCODONE 10 MG: 5 TABLET ORAL at 17:53

## 2025-01-17 RX ADMIN — HYDROMORPHONE HYDROCHLORIDE 0.5 MG: 1 INJECTION, SOLUTION INTRAMUSCULAR; INTRAVENOUS; SUBCUTANEOUS at 23:38

## 2025-01-17 RX ADMIN — ATORVASTATIN CALCIUM 80 MG: 80 TABLET, FILM COATED ORAL at 20:11

## 2025-01-17 RX ADMIN — SODIUM CHLORIDE 3000 MG: 900 INJECTION INTRAVENOUS at 16:06

## 2025-01-17 RX ADMIN — HEPARIN SODIUM 7500 UNITS: 5000 INJECTION INTRAVENOUS; SUBCUTANEOUS at 13:54

## 2025-01-17 RX ADMIN — OXYCODONE 10 MG: 5 TABLET ORAL at 13:52

## 2025-01-17 RX ADMIN — ASPIRIN 81 MG: 81 TABLET, COATED ORAL at 08:42

## 2025-01-17 RX ADMIN — PANTOPRAZOLE SODIUM 40 MG: 40 TABLET, DELAYED RELEASE ORAL at 08:42

## 2025-01-17 RX ADMIN — ACETAMINOPHEN 1000 MG: 500 TABLET ORAL at 20:10

## 2025-01-17 RX ADMIN — MUPIROCIN: 20 OINTMENT TOPICAL at 08:43

## 2025-01-17 RX ADMIN — OXYCODONE 10 MG: 5 TABLET ORAL at 09:32

## 2025-01-17 RX ADMIN — LIDOCAINE HYDROCHLORIDE 0.5 MG/MIN: 4 INJECTION, SOLUTION INTRAVENOUS at 04:53

## 2025-01-17 RX ADMIN — TAMSULOSIN HYDROCHLORIDE 0.4 MG: 0.4 CAPSULE ORAL at 08:42

## 2025-01-17 RX ADMIN — HEPARIN SODIUM 7500 UNITS: 5000 INJECTION INTRAVENOUS; SUBCUTANEOUS at 22:14

## 2025-01-17 RX ADMIN — ALBUMIN (HUMAN) 12.5 G: 12.5 INJECTION, SOLUTION INTRAVENOUS at 01:30

## 2025-01-17 RX ADMIN — INSULIN LISPRO 3 UNITS: 100 INJECTION, SOLUTION INTRAVENOUS; SUBCUTANEOUS at 12:05

## 2025-01-17 RX ADMIN — OXYCODONE 10 MG: 5 TABLET ORAL at 01:18

## 2025-01-17 RX ADMIN — SENNOSIDES AND DOCUSATE SODIUM 1 TABLET: 50; 8.6 TABLET ORAL at 20:11

## 2025-01-17 RX ADMIN — SODIUM CHLORIDE 3000 MG: 900 INJECTION INTRAVENOUS at 08:00

## 2025-01-17 RX ADMIN — ALBUTEROL SULFATE 2.5 MG: 2.5 SOLUTION RESPIRATORY (INHALATION) at 08:34

## 2025-01-17 RX ADMIN — SENNOSIDES AND DOCUSATE SODIUM 1 TABLET: 50; 8.6 TABLET ORAL at 08:42

## 2025-01-17 RX ADMIN — ASPIRIN 81 MG: 81 TABLET, COATED ORAL at 01:19

## 2025-01-17 RX ADMIN — ALBUTEROL SULFATE 2.5 MG: 2.5 SOLUTION RESPIRATORY (INHALATION) at 19:35

## 2025-01-17 RX ADMIN — AMIODARONE HYDROCHLORIDE 400 MG: 200 TABLET ORAL at 09:33

## 2025-01-17 RX ADMIN — Medication 10 ML: at 08:53

## 2025-01-17 RX ADMIN — VASOPRESSIN 0.04 UNITS/MIN: 20 INJECTION INTRAVENOUS at 01:57

## 2025-01-17 RX ADMIN — MUPIROCIN: 20 OINTMENT TOPICAL at 22:13

## 2025-01-17 RX ADMIN — ALBUTEROL SULFATE 2.5 MG: 2.5 SOLUTION RESPIRATORY (INHALATION) at 12:42

## 2025-01-17 RX ADMIN — Medication 0.03 MCG/KG/MIN: at 04:49

## 2025-01-17 RX ADMIN — INSULIN HUMAN 6.4 UNITS/HR: 1 INJECTION, SOLUTION INTRAVENOUS at 23:31

## 2025-01-17 RX ADMIN — ACETAMINOPHEN 1000 MG: 500 TABLET ORAL at 12:05

## 2025-01-17 RX ADMIN — VANCOMYCIN HYDROCHLORIDE 2000 MG: 1 INJECTION, POWDER, LYOPHILIZED, FOR SOLUTION INTRAVENOUS at 12:13

## 2025-01-17 RX ADMIN — HYDROMORPHONE HYDROCHLORIDE 0.5 MG: 1 INJECTION, SOLUTION INTRAMUSCULAR; INTRAVENOUS; SUBCUTANEOUS at 02:41

## 2025-01-17 RX ADMIN — AMIODARONE HYDROCHLORIDE 400 MG: 200 TABLET ORAL at 13:51

## 2025-01-17 RX ADMIN — AMIODARONE HYDROCHLORIDE 400 MG: 200 TABLET ORAL at 20:10

## 2025-01-17 RX ADMIN — FUROSEMIDE 20 MG: 10 INJECTION, SOLUTION INTRAMUSCULAR; INTRAVENOUS at 18:47

## 2025-01-17 RX ADMIN — ACETAMINOPHEN 1000 MG: 500 TABLET ORAL at 01:19

## 2025-01-17 RX ADMIN — OXYCODONE 10 MG: 5 TABLET ORAL at 05:10

## 2025-01-17 RX ADMIN — VANCOMYCIN HYDROCHLORIDE 2000 MG: 1 INJECTION, POWDER, LYOPHILIZED, FOR SOLUTION INTRAVENOUS at 00:21

## 2025-01-17 RX ADMIN — DIPHENHYDRAMINE HYDROCHLORIDE 12.5 MG: 25 TABLET ORAL at 21:26

## 2025-01-17 RX ADMIN — TAMSULOSIN HYDROCHLORIDE 0.4 MG: 0.4 CAPSULE ORAL at 01:19

## 2025-01-17 RX ADMIN — SODIUM CHLORIDE: 900 INJECTION, SOLUTION INTRAVENOUS at 23:51

## 2025-01-17 RX ADMIN — ALBUMIN (HUMAN) 12.5 G: 12.5 INJECTION, SOLUTION INTRAVENOUS at 04:30

## 2025-01-17 RX ADMIN — ACETAMINOPHEN 1000 MG: 500 TABLET ORAL at 04:49

## 2025-01-17 RX ADMIN — OXYCODONE 10 MG: 5 TABLET ORAL at 21:34

## 2025-01-17 RX ADMIN — INSULIN GLARGINE 30 UNITS: 100 INJECTION, SOLUTION SUBCUTANEOUS at 09:35

## 2025-01-17 RX ADMIN — POLYETHYLENE GLYCOL 3350 17 G: 17 POWDER, FOR SOLUTION ORAL at 08:42

## 2025-01-17 RX ADMIN — SODIUM CHLORIDE: 900 INJECTION, SOLUTION INTRAVENOUS at 04:55

## 2025-01-17 RX ADMIN — SODIUM CHLORIDE: 900 INJECTION, SOLUTION INTRAVENOUS at 01:58

## 2025-01-17 RX ADMIN — COLCHICINE 0.3 MG: 0.6 TABLET ORAL at 20:10

## 2025-01-17 RX ADMIN — ALBUTEROL SULFATE 2.5 MG: 2.5 SOLUTION RESPIRATORY (INHALATION) at 16:27

## 2025-01-17 ASSESSMENT — PAIN DESCRIPTION - LOCATION
LOCATION: CHEST
LOCATION: CHEST;GENERALIZED
LOCATION: CHEST
LOCATION: CHEST;RIB CAGE
LOCATION: CHEST
LOCATION: CHEST

## 2025-01-17 ASSESSMENT — PULMONARY FUNCTION TESTS
PIF_VALUE: 16
PIF_VALUE: 18
PIF_VALUE: 19

## 2025-01-17 ASSESSMENT — PAIN SCALES - GENERAL
PAINLEVEL_OUTOF10: 10
PAINLEVEL_OUTOF10: 8
PAINLEVEL_OUTOF10: 0
PAINLEVEL_OUTOF10: 9
PAINLEVEL_OUTOF10: 0
PAINLEVEL_OUTOF10: 10
PAINLEVEL_OUTOF10: 9
PAINLEVEL_OUTOF10: 0
PAINLEVEL_OUTOF10: 0
PAINLEVEL_OUTOF10: 8
PAINLEVEL_OUTOF10: 10
PAINLEVEL_OUTOF10: 9
PAINLEVEL_OUTOF10: 6
PAINLEVEL_OUTOF10: 8

## 2025-01-17 ASSESSMENT — PAIN - FUNCTIONAL ASSESSMENT
PAIN_FUNCTIONAL_ASSESSMENT: PREVENTS OR INTERFERES SOME ACTIVE ACTIVITIES AND ADLS
PAIN_FUNCTIONAL_ASSESSMENT: PREVENTS OR INTERFERES SOME ACTIVE ACTIVITIES AND ADLS

## 2025-01-17 ASSESSMENT — PAIN DESCRIPTION - FREQUENCY
FREQUENCY: CONTINUOUS
FREQUENCY: CONTINUOUS

## 2025-01-17 ASSESSMENT — PAIN DESCRIPTION - DESCRIPTORS
DESCRIPTORS: STABBING;SHARP
DESCRIPTORS: ACHING;SHARP;NAGGING
DESCRIPTORS: ACHING
DESCRIPTORS: ACHING;NAGGING
DESCRIPTORS: ACHING
DESCRIPTORS: STABBING;SHARP
DESCRIPTORS: ACHING;NAGGING
DESCRIPTORS: ACHING
DESCRIPTORS: SHARP;ACHING;NAGGING

## 2025-01-17 ASSESSMENT — PAIN DESCRIPTION - PAIN TYPE
TYPE: SURGICAL PAIN
TYPE: SURGICAL PAIN

## 2025-01-17 ASSESSMENT — PAIN DESCRIPTION - ORIENTATION
ORIENTATION: MID

## 2025-01-17 ASSESSMENT — PAIN DESCRIPTION - ONSET
ONSET: ON-GOING
ONSET: ON-GOING

## 2025-01-17 NOTE — ANESTHESIA POSTPROCEDURE EVALUATION
Department of Anesthesiology  Postprocedure Note    Patient: Kojo Gracia  MRN: [id number]  YOB: 1965  Date of evaluation: 1/16/2025    Procedure Summary       Date: 01/16/25 Room / Location: [place] [address] / [facility]    Anesthesia Start: 1153 Anesthesia Stop: 2036    Procedure: CORONARY ARTERY BYPASS GRAFTING X 4, AORTIC VALVE REPLACEMENT WITH 25MM EPIC MAX AORTIC VALVE, ASENDING AORTIC ANEURYSM REPLACEMENT WITH 34MM X 10MM HEMASHIELD PLATINUM GRAFT, LEFT ATRIAL APPENDAGE WITH 40 MM ATRICLIP, TOTAL CARDIOPULMONARY BYPASS, PAOLO, MEDISTIM FLOW VERIFICATION OF BYPASS GRAFTS, ENDOSCOPIC VEIN HARVEST OF RIGHT SAPHENOUS VEIN, INSERTION OF TEMPORARY ATRIAL AND VENTRICULAR PACING WIRES, MODIFIED MAZE, (Chest) Diagnosis:       Aortic valve disease      Coronary artery disease, unspecified vessel or lesion type, unspecified whether angina present, unspecified whether native or transplanted heart      (Aortic valve disease [I35.9])      (Coronary artery disease, unspecified vessel or lesion type, unspecified whether angina present, unspecified whether native or transplanted heart [I25.10])    Surgeons: Jose Cooley MD Responsible Provider: Lance Alfaro MD    Anesthesia Type: general ASA Status: 4            Anesthesia Type: No value filed.    Jose Phase I: Jose Score: 10    Jose Phase II:      Anesthesia Post Evaluation    Patient location during evaluation: ICU  Patient participation: complete - patient cannot participate  Level of consciousness: sedated and ventilated  Pain score: 2  Airway patency: patent  Nausea & Vomiting: no vomiting  Cardiovascular status: blood pressure returned to baseline  Respiratory status: intubated, acceptable and ventilator  Hydration status: euvolemic  Multimodal analgesia pain management approach  Pain management: adequate    No notable events documented.

## 2025-01-17 NOTE — PROCEDURES
Pulmonary Function Testing      Patient name:  Kojo Gracia      Unit #:   [id number]   Date of test:  1/13/2025   Date of interpretation:   1/17/2025    Mr. Kojo Gracia is a 59 y.o. year-old male. The spirometry data were acceptable and reproducible.     Spirometry:  Flow volume loops were normal. The FEV-1/FVC ratio was normal. The pre-bronchodilator FEV-1 was 2.3 liters (-2.48 Z score), which was moderately decreased. The FVC was 3.28 liters (-2.36 Z score), which was decreased. Response to inhaled bronchodilators (albuterol) was not performed.      Interpretation:  Nonspecific reduction in spirometry.  Lung volumes and diffusion capacity may be helpful if clinically indicated    Comments:  Z score  Mild -1.645 to -2.5  Moderate -2.5 to -4.0  Severe: < -4.0     Using Z-scores can reduce age and height bias, and the recommended thresholds correlate with mortality risk.    Stuart Nagy MD

## 2025-01-17 NOTE — ANESTHESIA PROCEDURE NOTES
Arterial Line:    An arterial line was placed using surface landmarks, in the pre-op for the following indication(s): continuous blood pressure monitoring and blood sampling needed.    A 20 gauge (size), 1 and 3/8 inch (length), Angiocath (type) catheter was placed, Seldinger technique not used, into the right radial artery, secured by tape and Tegaderm.  Anesthesia type: Local  Local infiltration: Injection    Events:  patient tolerated procedure well with no complications and EBL < 5mL.1/16/2025 11:35 AM1/16/2025 11:37 AM  Anesthesiologist: Bang Mathews MD  Performed: Anesthesiologist   Preanesthetic Checklist  Completed: patient identified, IV checked, site marked, risks and benefits discussed, surgical/procedural consents, equipment checked, pre-op evaluation, timeout performed, anesthesia consent given, oxygen available and monitors applied/VS acknowledged          
Central Venous Line:    A central venous line was placed using ultrasound guidance and surface landmarks, in the OR for the following indication(s): central venous access and CVP monitoring.1/16/2025 12:30 PM1/16/2025 12:35 PM    Sterility preparation included the following: provider used sterile gloves, gown, hat and mask, hand hygiene performed prior to central venous catheter insertion, sterile gel and probe cover used in ultrasound-guided central venous catheter insertion and maximum sterile barriers used during central venous catheter insertion.    The patient was placed in Trendelenburg position.The right internal jugular vein was prepped.    The site was prepped with Chloraprep.  A 9 Fr (size), introducer triple lumen was placed.    During the procedure, the following specific steps were taken: target vein identified, needle advanced into vein and blood aspirated and guidewire advanced into vein.    Intravenous verification was obtained by ultrasound and venous blood return.    Post insertion care included: all ports aspirated, all ports flushed easily, guidewire removed intact, Biopatch applied, line sutured in place and dressing applied.    During the procedure the patient experienced: patient tolerated procedure well with no complications and EBL < 5mL.      Outcomes: uncomplicated  Real-time US image taken/store: Yes  Anesthesia type: generalA(n) oximetric, 7.5 (size) Pulmonary Artery Catheter (PAC) was placed through the Introducer CVL in the right.  The PAC placement was confirmed by pressure tracing changes.  The patient experienced the following events during the procedure: patient tolerated procedure well with no complications.PA Cath placed?: Yes  Staffing  Anesthesiologist: Lance Alfrao MD  Performed by: Lance Alfaro MD  Authorized by: Lance Alfaro MD    Preanesthetic Checklist  Completed: patient identified, IV checked, site marked, risks and benefits discussed, 
Procedure Performed: PAOLO       Start Time:  1/16/2025 7:35 PM       End Time:   1/16/2025 8:35 PM    Anesthesia Information  Performed Personally        Preanesthesia Checklist:  Patient identified, IV assessed, risks and benefits discussed, monitors and equipment assessed, procedure being performed at surgeon's request and anesthesia consent obtained.    General Procedure Information  Diagnostic Indications for Echo:  assessment of surgical repair, hemodynamic monitoring and assessment of valve function  Physician Requesting Echo: Jose Cooley MD  Location performed:  OR  Intubated  Bite block placed  Heart visualized  Probe Insertion:  Easy  Probe Type:  3D and mulitplane  Modalities:  3D, color flow mapping, pulse wave Doppler and continuous wave Doppler    Echocardiographic and Doppler Measurements    Ventricles    Ventricle  Cavity Size  Cavity          Dimension  Hypertrophy  Thrombus  Global FXN  EF    RV  normal    No  No  normal  50%    LV  normal    Yes  No  normal  60%       Ventricular Regional Function:  1- Basal Anteroseptal:  normal  2- Basal Anterior:  normal  3- Basal Anterolateral:  normal  4- Basal Inferolateral:  normal  5- Basal Inferior:  normal  6- Basal Inferoseptal:  normal  7- Mid Anteroseptal:  normal  8- Mid Anterior:  normal  9- Mid Anterolateral:  normal  10- Mid Inferolateral:  normal  11- Mid Inferior:  normal  12- Mid Inferoseptal:  normal  13- Apical Anterior:  normal  14- Apical Lateral:  normal  15- Apical Inferior:  normal  16- Apical Septal:  normal  17- [place]:  normal        Valves     Valves  Annulus  Stenosis Measurements   Regurg  Leaflet   Morph  Leaflet   Motion Valve Comments    Aortic normal, bioprosthetic Stenosis none.        Mean Gradient: 8 mmHg    none   normal normal       Mitral normal none             trace normal normal    Tricuspid normal   not present         none   normal   normal      Pulmonic normal   not present         none              Aorta     
Size  Diam(cm)  Dissection Osei Classification    Ascending normal         Arch normal        Descending normal    Dissection not present.                Atria     Size  SEC (smoke)  Thrombus  Tumor  Device    Rt Atrium normal No.   No.   No.   No.        Lt Atrium normal No.   No No No.         Left Atrial Appendage: normal        Septa    Interatrial Septal Morphology: normal          Interventricular Septal Morphology: normal              Other Findings  Pericardium:  normal  Pleural Effusion:  none  Pulmonary Arteries:  normal  Pulmonary Venous Flow:  normal

## 2025-01-17 NOTE — OP NOTE
OPERATIVE REPORT - CV SURGERY     Patient: Kojo Gracia  YOB: 1965  MRN: [id number]    Date of Procedure: 1/16/2025     Pre-procedure Diagnosis: Acute coronary syndrome, atrial fibrillation, severe bicuspid aortic stenosis, 5+cm ascending aortic aneurysm with evidence of remote aortic dissection, extreme LVH with preserved systolic LV function.  Post-procedure Diagnosis:  same     PROCEDURE: 1) Left atrial ablation (box lesion with Encompass), ligament of Franky ablation, AtriClip (LAAO) 40mm 2) CABG x 4: SVG(seq)-PDA-OM3, SVG-OM1, SVG-Diag  3) Aortic valve replacement 25mm Epic Max (porcine) 4) Ascending aortic through proximal arch replacement with 34mm Dacron tube graft and retrograde cerebral perfusion @24C 5) EV RLE     Surgeons: Jose Cooley MD, Elis Cope MD  Assistants: First Assistant: MARGARETTE Gray, MARGARETTE Domingo also assisted and harvested the saphenous vein  Anesthesia:  SHEFALI Alfaro MD     Indications: 59 WM, obese smoker with known ascending aortic aneurysm and moderate bicuspid AS presented with ACS and new onset Afib and was evaluated after an ER admission. CTA showed a 5.2cm tubular ascending aneurysm with evidence of a remote intimal tear that has healed with a calcified strip from the mid-ascending aorta to the base of the innominate artery. Cath showed multi-vessel CAD and echo showed severe AS with preserved systolic LV function. He was referred for inpatient treatment of all of these.  Findings: Could not arrest the heart antegrade with Custodiol despite 3L dose. Allowed him to fibrillate while cooling and vented and ultimately was able to obtain and sustain an arrest with cold blood cardioplegia via the coronary sinus and the SVG-PDA graft. The distal aortic reconstruction required resection of the calcific healed tear that extended into the proximal innominate artery, more difficult than usual. Heavily calcified and restricted fused-type BAV

## 2025-01-17 NOTE — RT PROTOCOL NOTE
RT Inhaler-Nebulizer Bronchodilator Protocol Note    There is a bronchodilator order in the chart from a provider indicating to follow the RT Bronchodilator Protocol and there is an “Initiate RT Inhaler-Nebulizer Bronchodilator Protocol” order as well (see protocol at bottom of note).    CXR Findings:  XR CHEST PORTABLE    Result Date: 1/16/2025  Status post open heart surgery, with lines and tubes in place as discussed above. No evidence of a pneumothorax. Bibasilar atelectasis.       The findings from the last RT Protocol Assessment were as follows:   History Pulmonary Disease: None or smoker <15 pack years  Respiratory Pattern: Regular pattern and RR 12-20 bpm  Breath Sounds: Slightly diminished and/or crackles  Cough: Strong, spontaneous, non-productive  Indication for Bronchodilator Therapy:    Bronchodilator Assessment Score: 2    Aerosolized bronchodilator medication orders have been revised according to the RT Inhaler-Nebulizer Bronchodilator Protocol below.    Respiratory Therapist to perform RT Therapy Protocol Assessment initially then follow the protocol.  Repeat RT Therapy Protocol Assessment PRN for score 0-3 or on second treatment, BID, and PRN for scores above 3.    No Indications - adjust the frequency to every 6 hours PRN wheezing or bronchospasm, if no treatments needed after 48 hours then discontinue using Per Protocol order mode.     If indication present, adjust the RT bronchodilator orders based on the Bronchodilator Assessment Score as indicated below.  Use Inhaler orders unless patient has one or more of the following: on home nebulizer, not able to hold breath for 10 seconds, is not alert and oriented, cannot activate and use MDI correctly, or respiratory rate 25 breaths per minute or more, then use the equivalent nebulizer order(s) with same Frequency and PRN reasons based on the score.  If a patient is on this medication at home then do not decrease Frequency below that used at

## 2025-01-18 ENCOUNTER — APPOINTMENT (OUTPATIENT)
Dept: GENERAL RADIOLOGY | Age: 60
DRG: 216 | End: 2025-01-18
Payer: COMMERCIAL

## 2025-01-18 LAB
ANION GAP SERPL CALCULATED.3IONS-SCNC: 13 MMOL/L (ref 3–16)
BUN SERPL-MCNC: 30 MG/DL (ref 7–20)
CALCIUM SERPL-MCNC: 8.6 MG/DL (ref 8.3–10.6)
CHLORIDE SERPL-SCNC: 105 MMOL/L (ref 99–110)
CO2 SERPL-SCNC: 22 MMOL/L (ref 21–32)
CREAT SERPL-MCNC: 1.2 MG/DL (ref 0.8–1.3)
DEPRECATED RDW RBC AUTO: 13.6 % (ref 12.4–15.4)
FIBRINOGEN PPP-MCNC: 407 MG/DL (ref 227–534)
GFR SERPLBLD CREATININE-BSD FMLA CKD-EPI: 69 ML/MIN/{1.73_M2}
GLUCOSE BLD-MCNC: 100 MG/DL (ref 70–99)
GLUCOSE BLD-MCNC: 100 MG/DL (ref 70–99)
GLUCOSE BLD-MCNC: 101 MG/DL (ref 70–99)
GLUCOSE BLD-MCNC: 105 MG/DL (ref 70–99)
GLUCOSE BLD-MCNC: 109 MG/DL (ref 70–99)
GLUCOSE BLD-MCNC: 111 MG/DL (ref 70–99)
GLUCOSE BLD-MCNC: 113 MG/DL (ref 70–99)
GLUCOSE BLD-MCNC: 114 MG/DL (ref 70–99)
GLUCOSE BLD-MCNC: 123 MG/DL (ref 70–99)
GLUCOSE BLD-MCNC: 123 MG/DL (ref 70–99)
GLUCOSE BLD-MCNC: 127 MG/DL (ref 70–99)
GLUCOSE BLD-MCNC: 128 MG/DL (ref 70–99)
GLUCOSE BLD-MCNC: 132 MG/DL (ref 70–99)
GLUCOSE BLD-MCNC: 137 MG/DL (ref 70–99)
GLUCOSE BLD-MCNC: 142 MG/DL (ref 70–99)
GLUCOSE BLD-MCNC: 143 MG/DL (ref 70–99)
GLUCOSE BLD-MCNC: 187 MG/DL (ref 70–99)
GLUCOSE BLD-MCNC: 201 MG/DL (ref 70–99)
GLUCOSE BLD-MCNC: 92 MG/DL (ref 70–99)
GLUCOSE BLD-MCNC: 97 MG/DL (ref 70–99)
GLUCOSE BLD-MCNC: 98 MG/DL (ref 70–99)
GLUCOSE SERPL-MCNC: 136 MG/DL (ref 70–99)
HCT VFR BLD AUTO: 29.8 % (ref 40.5–52.5)
HGB BLD-MCNC: 10.2 G/DL (ref 13.5–17.5)
MAGNESIUM SERPL-MCNC: 2.02 MG/DL (ref 1.8–2.4)
MCH RBC QN AUTO: 31.7 PG (ref 26–34)
MCHC RBC AUTO-ENTMCNC: 34.3 G/DL (ref 31–36)
MCV RBC AUTO: 92.5 FL (ref 80–100)
PERFORMED ON: ABNORMAL
PERFORMED ON: NORMAL
PLATELET # BLD AUTO: 109 K/UL (ref 135–450)
PMV BLD AUTO: 9.6 FL (ref 5–10.5)
POC SAMPLE TYPE: ABNORMAL
POC SAMPLE TYPE: NORMAL
POC SAMPLE TYPE: NORMAL
POTASSIUM BLD-SCNC: 3.6 MMOL/L (ref 3.5–5.1)
POTASSIUM BLD-SCNC: 3.7 MMOL/L (ref 3.5–5.1)
POTASSIUM BLD-SCNC: 4.1 MMOL/L (ref 3.5–5.1)
POTASSIUM SERPL-SCNC: 3.4 MMOL/L (ref 3.5–5.1)
POTASSIUM SERPL-SCNC: 3.9 MMOL/L (ref 3.5–5.1)
RBC # BLD AUTO: 3.22 M/UL (ref 4.2–5.9)
SODIUM SERPL-SCNC: 140 MMOL/L (ref 136–145)
WBC # BLD AUTO: 17.7 K/UL (ref 4–11)

## 2025-01-18 PROCEDURE — 2000000000 HC ICU R&B

## 2025-01-18 PROCEDURE — 2580000003 HC RX 258: Performed by: NURSE PRACTITIONER

## 2025-01-18 PROCEDURE — 2500000003 HC RX 250 WO HCPCS

## 2025-01-18 PROCEDURE — 82947 ASSAY GLUCOSE BLOOD QUANT: CPT

## 2025-01-18 PROCEDURE — 94761 N-INVAS EAR/PLS OXIMETRY MLT: CPT

## 2025-01-18 PROCEDURE — 84132 ASSAY OF SERUM POTASSIUM: CPT

## 2025-01-18 PROCEDURE — 6360000002 HC RX W HCPCS: Performed by: NURSE PRACTITIONER

## 2025-01-18 PROCEDURE — 85027 COMPLETE CBC AUTOMATED: CPT

## 2025-01-18 PROCEDURE — 6370000000 HC RX 637 (ALT 250 FOR IP): Performed by: THORACIC SURGERY (CARDIOTHORACIC VASCULAR SURGERY)

## 2025-01-18 PROCEDURE — 6360000002 HC RX W HCPCS: Performed by: THORACIC SURGERY (CARDIOTHORACIC VASCULAR SURGERY)

## 2025-01-18 PROCEDURE — 6360000002 HC RX W HCPCS

## 2025-01-18 PROCEDURE — 94640 AIRWAY INHALATION TREATMENT: CPT

## 2025-01-18 PROCEDURE — 83735 ASSAY OF MAGNESIUM: CPT

## 2025-01-18 PROCEDURE — 2700000000 HC OXYGEN THERAPY PER DAY

## 2025-01-18 PROCEDURE — 80048 BASIC METABOLIC PNL TOTAL CA: CPT

## 2025-01-18 PROCEDURE — 71045 X-RAY EXAM CHEST 1 VIEW: CPT

## 2025-01-18 PROCEDURE — 85384 FIBRINOGEN ACTIVITY: CPT

## 2025-01-18 PROCEDURE — 6370000000 HC RX 637 (ALT 250 FOR IP)

## 2025-01-18 PROCEDURE — 94667 MNPJ CHEST WALL 1ST: CPT

## 2025-01-18 PROCEDURE — 2580000003 HC RX 258

## 2025-01-18 PROCEDURE — 94669 MECHANICAL CHEST WALL OSCILL: CPT

## 2025-01-18 RX ORDER — FUROSEMIDE 10 MG/ML
40 INJECTION INTRAMUSCULAR; INTRAVENOUS ONCE
Status: COMPLETED | OUTPATIENT
Start: 2025-01-18 | End: 2025-01-18

## 2025-01-18 RX ORDER — PROMETHAZINE HYDROCHLORIDE AND CODEINE PHOSPHATE 6.25; 1 MG/5ML; MG/5ML
5 SYRUP ORAL EVERY 4 HOURS PRN
Status: DISCONTINUED | OUTPATIENT
Start: 2025-01-18 | End: 2025-01-22 | Stop reason: HOSPADM

## 2025-01-18 RX ORDER — ACETYLCYSTEINE 200 MG/ML
600 SOLUTION ORAL; RESPIRATORY (INHALATION)
Status: DISCONTINUED | OUTPATIENT
Start: 2025-01-18 | End: 2025-01-22 | Stop reason: HOSPADM

## 2025-01-18 RX ORDER — ALBUTEROL SULFATE 0.83 MG/ML
2.5 SOLUTION RESPIRATORY (INHALATION) EVERY 6 HOURS PRN
Status: DISCONTINUED | OUTPATIENT
Start: 2025-01-18 | End: 2025-01-22 | Stop reason: HOSPADM

## 2025-01-18 RX ORDER — BUMETANIDE 0.25 MG/ML
1 INJECTION, SOLUTION INTRAMUSCULAR; INTRAVENOUS CONTINUOUS
Status: DISCONTINUED | OUTPATIENT
Start: 2025-01-18 | End: 2025-01-18 | Stop reason: SDUPTHER

## 2025-01-18 RX ORDER — INSULIN GLARGINE 100 [IU]/ML
15 INJECTION, SOLUTION SUBCUTANEOUS ONCE
Status: COMPLETED | OUTPATIENT
Start: 2025-01-18 | End: 2025-01-18

## 2025-01-18 RX ORDER — METOPROLOL TARTRATE 25 MG/1
12.5 TABLET, FILM COATED ORAL 2 TIMES DAILY
Status: DISCONTINUED | OUTPATIENT
Start: 2025-01-18 | End: 2025-01-19 | Stop reason: ALTCHOICE

## 2025-01-18 RX ORDER — GUAIFENESIN 600 MG/1
600 TABLET, EXTENDED RELEASE ORAL 2 TIMES DAILY
Status: DISCONTINUED | OUTPATIENT
Start: 2025-01-18 | End: 2025-01-22 | Stop reason: HOSPADM

## 2025-01-18 RX ORDER — ACETYLCYSTEINE 200 MG/ML
600 SOLUTION ORAL; RESPIRATORY (INHALATION) AS NEEDED
Status: DISCONTINUED | OUTPATIENT
Start: 2025-01-18 | End: 2025-01-22 | Stop reason: HOSPADM

## 2025-01-18 RX ORDER — POTASSIUM CHLORIDE 1500 MG/1
20 TABLET, EXTENDED RELEASE ORAL EVERY 8 HOURS
Status: DISCONTINUED | OUTPATIENT
Start: 2025-01-18 | End: 2025-01-20

## 2025-01-18 RX ORDER — BUMETANIDE 0.25 MG/ML
2 INJECTION, SOLUTION INTRAMUSCULAR; INTRAVENOUS ONCE
Status: COMPLETED | OUTPATIENT
Start: 2025-01-18 | End: 2025-01-18

## 2025-01-18 RX ORDER — PROMETHAZINE HYDROCHLORIDE AND CODEINE PHOSPHATE 6.25; 1 MG/5ML; MG/5ML
5 SYRUP ORAL ONCE
Status: COMPLETED | OUTPATIENT
Start: 2025-01-18 | End: 2025-01-18

## 2025-01-18 RX ADMIN — POTASSIUM CHLORIDE 20 MEQ: 10 TABLET, EXTENDED RELEASE ORAL at 14:58

## 2025-01-18 RX ADMIN — HEPARIN SODIUM 7500 UNITS: 5000 INJECTION INTRAVENOUS; SUBCUTANEOUS at 21:30

## 2025-01-18 RX ADMIN — ACETYLCYSTEINE 600 MG: 200 SOLUTION ORAL; RESPIRATORY (INHALATION) at 15:36

## 2025-01-18 RX ADMIN — ACETAMINOPHEN 1000 MG: 500 TABLET ORAL at 22:41

## 2025-01-18 RX ADMIN — ALBUTEROL SULFATE 2.5 MG: 2.5 SOLUTION RESPIRATORY (INHALATION) at 08:34

## 2025-01-18 RX ADMIN — ALBUTEROL SULFATE 2.5 MG: 2.5 SOLUTION RESPIRATORY (INHALATION) at 11:41

## 2025-01-18 RX ADMIN — ALBUTEROL SULFATE 2.5 MG: 2.5 SOLUTION RESPIRATORY (INHALATION) at 15:36

## 2025-01-18 RX ADMIN — ACETYLCYSTEINE 600 MG: 200 SOLUTION ORAL; RESPIRATORY (INHALATION) at 04:14

## 2025-01-18 RX ADMIN — GUAIFENESIN 600 MG: 600 TABLET, MULTILAYER, EXTENDED RELEASE ORAL at 21:19

## 2025-01-18 RX ADMIN — PANTOPRAZOLE SODIUM 40 MG: 40 TABLET, DELAYED RELEASE ORAL at 08:46

## 2025-01-18 RX ADMIN — SENNOSIDES AND DOCUSATE SODIUM 1 TABLET: 50; 8.6 TABLET ORAL at 08:46

## 2025-01-18 RX ADMIN — PIPERACILLIN AND TAZOBACTAM 4500 MG: 4; .5 INJECTION, POWDER, FOR SOLUTION INTRAVENOUS at 19:28

## 2025-01-18 RX ADMIN — ALBUTEROL SULFATE 2.5 MG: 2.5 SOLUTION RESPIRATORY (INHALATION) at 20:34

## 2025-01-18 RX ADMIN — HEPARIN SODIUM 7500 UNITS: 5000 INJECTION INTRAVENOUS; SUBCUTANEOUS at 06:18

## 2025-01-18 RX ADMIN — SENNOSIDES AND DOCUSATE SODIUM 1 TABLET: 50; 8.6 TABLET ORAL at 21:20

## 2025-01-18 RX ADMIN — ACETYLCYSTEINE 600 MG: 200 SOLUTION ORAL; RESPIRATORY (INHALATION) at 08:34

## 2025-01-18 RX ADMIN — POTASSIUM CHLORIDE 20 MEQ: 1500 TABLET, EXTENDED RELEASE ORAL at 06:17

## 2025-01-18 RX ADMIN — POTASSIUM CHLORIDE 20 MEQ: 29.8 INJECTION, SOLUTION INTRAVENOUS at 09:21

## 2025-01-18 RX ADMIN — INSULIN HUMAN 18.3 UNITS/HR: 1 INJECTION, SOLUTION INTRAVENOUS at 11:06

## 2025-01-18 RX ADMIN — ACETAMINOPHEN 1000 MG: 500 TABLET ORAL at 13:51

## 2025-01-18 RX ADMIN — HYDROMORPHONE HYDROCHLORIDE 0.5 MG: 1 INJECTION, SOLUTION INTRAMUSCULAR; INTRAVENOUS; SUBCUTANEOUS at 21:27

## 2025-01-18 RX ADMIN — HYDROMORPHONE HYDROCHLORIDE 0.25 MG: 1 INJECTION, SOLUTION INTRAMUSCULAR; INTRAVENOUS; SUBCUTANEOUS at 08:44

## 2025-01-18 RX ADMIN — METOPROLOL TARTRATE 12.5 MG: 25 TABLET, FILM COATED ORAL at 21:19

## 2025-01-18 RX ADMIN — POLYETHYLENE GLYCOL 3350 17 G: 17 POWDER, FOR SOLUTION ORAL at 08:45

## 2025-01-18 RX ADMIN — ACETYLCYSTEINE 600 MG: 200 SOLUTION ORAL; RESPIRATORY (INHALATION) at 20:34

## 2025-01-18 RX ADMIN — POTASSIUM CHLORIDE 20 MEQ: 10 TABLET, EXTENDED RELEASE ORAL at 22:37

## 2025-01-18 RX ADMIN — Medication 5 ML: at 13:29

## 2025-01-18 RX ADMIN — INSULIN GLARGINE 15 UNITS: 100 INJECTION, SOLUTION SUBCUTANEOUS at 13:50

## 2025-01-18 RX ADMIN — POTASSIUM CHLORIDE 20 MEQ: 29.8 INJECTION, SOLUTION INTRAVENOUS at 10:35

## 2025-01-18 RX ADMIN — AMIODARONE HYDROCHLORIDE 400 MG: 200 TABLET ORAL at 08:46

## 2025-01-18 RX ADMIN — ATORVASTATIN CALCIUM 80 MG: 80 TABLET, FILM COATED ORAL at 21:19

## 2025-01-18 RX ADMIN — COLCHICINE 0.3 MG: 0.6 TABLET ORAL at 21:19

## 2025-01-18 RX ADMIN — FUROSEMIDE 40 MG: 10 INJECTION, SOLUTION INTRAMUSCULAR; INTRAVENOUS at 03:41

## 2025-01-18 RX ADMIN — METOPROLOL TARTRATE 12.5 MG: 25 TABLET, FILM COATED ORAL at 13:58

## 2025-01-18 RX ADMIN — COLCHICINE 0.3 MG: 0.6 TABLET ORAL at 08:46

## 2025-01-18 RX ADMIN — GUAIFENESIN 600 MG: 600 TABLET, MULTILAYER, EXTENDED RELEASE ORAL at 13:58

## 2025-01-18 RX ADMIN — BUMETANIDE 1 MG/HR: 0.25 INJECTION INTRAMUSCULAR; INTRAVENOUS at 13:40

## 2025-01-18 RX ADMIN — SODIUM CHLORIDE 3000 MG: 900 INJECTION INTRAVENOUS at 08:49

## 2025-01-18 RX ADMIN — AMIODARONE HYDROCHLORIDE 400 MG: 200 TABLET ORAL at 13:51

## 2025-01-18 RX ADMIN — OXYCODONE 10 MG: 5 TABLET ORAL at 10:41

## 2025-01-18 RX ADMIN — Medication 5 ML: at 03:38

## 2025-01-18 RX ADMIN — MUPIROCIN: 20 OINTMENT TOPICAL at 08:50

## 2025-01-18 RX ADMIN — MUPIROCIN: 20 OINTMENT TOPICAL at 22:39

## 2025-01-18 RX ADMIN — INSULIN GLARGINE 30 UNITS: 100 INJECTION, SOLUTION SUBCUTANEOUS at 08:45

## 2025-01-18 RX ADMIN — ALBUTEROL SULFATE 2.5 MG: 2.5 SOLUTION RESPIRATORY (INHALATION) at 04:14

## 2025-01-18 RX ADMIN — Medication 5 ML: at 22:38

## 2025-01-18 RX ADMIN — PIPERACILLIN AND TAZOBACTAM 4500 MG: 4; .5 INJECTION, POWDER, LYOPHILIZED, FOR SOLUTION INTRAVENOUS; PARENTERAL at 14:12

## 2025-01-18 RX ADMIN — AMIODARONE HYDROCHLORIDE 400 MG: 200 TABLET ORAL at 21:19

## 2025-01-18 RX ADMIN — ASPIRIN 81 MG: 81 TABLET, COATED ORAL at 08:46

## 2025-01-18 RX ADMIN — HYDROMORPHONE HYDROCHLORIDE 0.5 MG: 1 INJECTION, SOLUTION INTRAMUSCULAR; INTRAVENOUS; SUBCUTANEOUS at 02:38

## 2025-01-18 RX ADMIN — Medication 10 ML: at 08:50

## 2025-01-18 RX ADMIN — OXYCODONE 10 MG: 5 TABLET ORAL at 19:19

## 2025-01-18 RX ADMIN — ACETAMINOPHEN 1000 MG: 500 TABLET ORAL at 04:59

## 2025-01-18 RX ADMIN — SODIUM CHLORIDE: 900 INJECTION, SOLUTION INTRAVENOUS at 00:03

## 2025-01-18 RX ADMIN — OXYCODONE 10 MG: 5 TABLET ORAL at 01:35

## 2025-01-18 RX ADMIN — HYDROMORPHONE HYDROCHLORIDE 0.5 MG: 1 INJECTION, SOLUTION INTRAMUSCULAR; INTRAVENOUS; SUBCUTANEOUS at 14:05

## 2025-01-18 RX ADMIN — OXYCODONE 10 MG: 5 TABLET ORAL at 06:17

## 2025-01-18 RX ADMIN — SODIUM CHLORIDE 3000 MG: 900 INJECTION INTRAVENOUS at 00:06

## 2025-01-18 RX ADMIN — ALBUTEROL SULFATE 2.5 MG: 2.5 SOLUTION RESPIRATORY (INHALATION) at 03:06

## 2025-01-18 RX ADMIN — VANCOMYCIN HYDROCHLORIDE 2000 MG: 1 INJECTION, POWDER, LYOPHILIZED, FOR SOLUTION INTRAVENOUS at 00:42

## 2025-01-18 RX ADMIN — POTASSIUM CHLORIDE 40 MEQ: 1500 TABLET, EXTENDED RELEASE ORAL at 03:41

## 2025-01-18 RX ADMIN — TAMSULOSIN HYDROCHLORIDE 0.4 MG: 0.4 CAPSULE ORAL at 08:46

## 2025-01-18 RX ADMIN — HEPARIN SODIUM 7500 UNITS: 5000 INJECTION INTRAVENOUS; SUBCUTANEOUS at 13:50

## 2025-01-18 RX ADMIN — BUMETANIDE 2 MG: 0.25 INJECTION INTRAMUSCULAR; INTRAVENOUS at 15:56

## 2025-01-18 RX ADMIN — ONDANSETRON 4 MG: 4 TABLET, ORALLY DISINTEGRATING ORAL at 10:48

## 2025-01-18 ASSESSMENT — PAIN SCALES - GENERAL
PAINLEVEL_OUTOF10: 8
PAINLEVEL_OUTOF10: 10
PAINLEVEL_OUTOF10: 9
PAINLEVEL_OUTOF10: 8
PAINLEVEL_OUTOF10: 7
PAINLEVEL_OUTOF10: 8
PAINLEVEL_OUTOF10: 9
PAINLEVEL_OUTOF10: 9
PAINLEVEL_OUTOF10: 10
PAINLEVEL_OUTOF10: 10
PAINLEVEL_OUTOF10: 9
PAINLEVEL_OUTOF10: 8
PAINLEVEL_OUTOF10: 6
PAINLEVEL_OUTOF10: 7
PAINLEVEL_OUTOF10: 1
PAINLEVEL_OUTOF10: 9

## 2025-01-18 ASSESSMENT — PAIN DESCRIPTION - LOCATION
LOCATION: CHEST
LOCATION: GENERALIZED;RIB CAGE;CHEST
LOCATION: CHEST;GENERALIZED;RIB CAGE
LOCATION: CHEST;GENERALIZED;RIB CAGE
LOCATION: CHEST

## 2025-01-18 ASSESSMENT — PAIN DESCRIPTION - ORIENTATION
ORIENTATION: MID
ORIENTATION: RIGHT;LEFT
ORIENTATION: MID

## 2025-01-18 ASSESSMENT — PAIN DESCRIPTION - DESCRIPTORS
DESCRIPTORS: STABBING;SHARP
DESCRIPTORS: ACHING
DESCRIPTORS: SHARP
DESCRIPTORS: STABBING;SHARP
DESCRIPTORS: ACHING
DESCRIPTORS: SHARP;STABBING

## 2025-01-19 ENCOUNTER — APPOINTMENT (OUTPATIENT)
Dept: GENERAL RADIOLOGY | Age: 60
DRG: 216 | End: 2025-01-19
Payer: COMMERCIAL

## 2025-01-19 LAB
ALBUMIN SERPL-MCNC: 3.2 G/DL (ref 3.4–5)
ALBUMIN/GLOB SERPL: 1.4 {RATIO} (ref 1.1–2.2)
ALP SERPL-CCNC: 196 U/L (ref 40–129)
ALT SERPL-CCNC: 234 U/L (ref 10–40)
ANION GAP SERPL CALCULATED.3IONS-SCNC: 12 MMOL/L (ref 3–16)
ANION GAP SERPL CALCULATED.3IONS-SCNC: 9 MMOL/L (ref 3–16)
AST SERPL-CCNC: 390 U/L (ref 15–37)
BILIRUB SERPL-MCNC: 0.9 MG/DL (ref 0–1)
BLOOD BANK DISPENSE STATUS: NORMAL
BLOOD BANK PRODUCT CODE: NORMAL
BPU ID: NORMAL
BUN SERPL-MCNC: 31 MG/DL (ref 7–20)
BUN SERPL-MCNC: 39 MG/DL (ref 7–20)
CALCIUM SERPL-MCNC: 8.2 MG/DL (ref 8.3–10.6)
CALCIUM SERPL-MCNC: 8.5 MG/DL (ref 8.3–10.6)
CHLORIDE SERPL-SCNC: 96 MMOL/L (ref 99–110)
CHLORIDE SERPL-SCNC: 96 MMOL/L (ref 99–110)
CO2 SERPL-SCNC: 27 MMOL/L (ref 21–32)
CO2 SERPL-SCNC: 28 MMOL/L (ref 21–32)
CREAT SERPL-MCNC: 1.4 MG/DL (ref 0.8–1.3)
CREAT SERPL-MCNC: 1.5 MG/DL (ref 0.8–1.3)
DEPRECATED RDW RBC AUTO: 13.6 % (ref 12.4–15.4)
DESCRIPTION BLOOD BANK: NORMAL
GFR SERPLBLD CREATININE-BSD FMLA CKD-EPI: 53 ML/MIN/{1.73_M2}
GFR SERPLBLD CREATININE-BSD FMLA CKD-EPI: 57 ML/MIN/{1.73_M2}
GLUCOSE BLD-MCNC: 103 MG/DL (ref 70–99)
GLUCOSE BLD-MCNC: 104 MG/DL (ref 70–99)
GLUCOSE BLD-MCNC: 106 MG/DL (ref 70–99)
GLUCOSE BLD-MCNC: 113 MG/DL (ref 70–99)
GLUCOSE BLD-MCNC: 123 MG/DL (ref 70–99)
GLUCOSE BLD-MCNC: 130 MG/DL (ref 70–99)
GLUCOSE BLD-MCNC: 152 MG/DL (ref 70–99)
GLUCOSE BLD-MCNC: 156 MG/DL (ref 70–99)
GLUCOSE BLD-MCNC: 164 MG/DL (ref 70–99)
GLUCOSE BLD-MCNC: 171 MG/DL (ref 70–99)
GLUCOSE BLD-MCNC: 201 MG/DL (ref 70–99)
GLUCOSE BLD-MCNC: 84 MG/DL (ref 70–99)
GLUCOSE BLD-MCNC: 85 MG/DL (ref 70–99)
GLUCOSE SERPL-MCNC: 120 MG/DL (ref 70–99)
GLUCOSE SERPL-MCNC: 94 MG/DL (ref 70–99)
HCT VFR BLD AUTO: 28.5 % (ref 40.5–52.5)
HGB BLD-MCNC: 9.7 G/DL (ref 13.5–17.5)
MAGNESIUM SERPL-MCNC: 2.03 MG/DL (ref 1.8–2.4)
MCH RBC QN AUTO: 31.6 PG (ref 26–34)
MCHC RBC AUTO-ENTMCNC: 34.1 G/DL (ref 31–36)
MCV RBC AUTO: 92.5 FL (ref 80–100)
PERFORMED ON: ABNORMAL
PERFORMED ON: NORMAL
PERFORMED ON: NORMAL
PLATELET # BLD AUTO: 116 K/UL (ref 135–450)
PMV BLD AUTO: 9.9 FL (ref 5–10.5)
POTASSIUM SERPL-SCNC: 4 MMOL/L (ref 3.5–5.1)
POTASSIUM SERPL-SCNC: 4.1 MMOL/L (ref 3.5–5.1)
PROT SERPL-MCNC: 5.5 G/DL (ref 6.4–8.2)
RBC # BLD AUTO: 3.09 M/UL (ref 4.2–5.9)
SODIUM SERPL-SCNC: 132 MMOL/L (ref 136–145)
SODIUM SERPL-SCNC: 136 MMOL/L (ref 136–145)
WBC # BLD AUTO: 18.6 K/UL (ref 4–11)

## 2025-01-19 PROCEDURE — 2580000003 HC RX 258: Performed by: NURSE PRACTITIONER

## 2025-01-19 PROCEDURE — 94761 N-INVAS EAR/PLS OXIMETRY MLT: CPT

## 2025-01-19 PROCEDURE — 94668 MNPJ CHEST WALL SBSQ: CPT

## 2025-01-19 PROCEDURE — 2000000000 HC ICU R&B

## 2025-01-19 PROCEDURE — 71045 X-RAY EXAM CHEST 1 VIEW: CPT

## 2025-01-19 PROCEDURE — 6370000000 HC RX 637 (ALT 250 FOR IP)

## 2025-01-19 PROCEDURE — 6370000000 HC RX 637 (ALT 250 FOR IP): Performed by: THORACIC SURGERY (CARDIOTHORACIC VASCULAR SURGERY)

## 2025-01-19 PROCEDURE — 2500000003 HC RX 250 WO HCPCS

## 2025-01-19 PROCEDURE — 2580000003 HC RX 258: Performed by: THORACIC SURGERY (CARDIOTHORACIC VASCULAR SURGERY)

## 2025-01-19 PROCEDURE — 6360000002 HC RX W HCPCS

## 2025-01-19 PROCEDURE — 6360000002 HC RX W HCPCS: Performed by: NURSE PRACTITIONER

## 2025-01-19 PROCEDURE — 2700000000 HC OXYGEN THERAPY PER DAY

## 2025-01-19 PROCEDURE — 85027 COMPLETE CBC AUTOMATED: CPT

## 2025-01-19 PROCEDURE — 94669 MECHANICAL CHEST WALL OSCILL: CPT

## 2025-01-19 PROCEDURE — 6360000002 HC RX W HCPCS: Performed by: THORACIC SURGERY (CARDIOTHORACIC VASCULAR SURGERY)

## 2025-01-19 PROCEDURE — 94640 AIRWAY INHALATION TREATMENT: CPT

## 2025-01-19 PROCEDURE — 83735 ASSAY OF MAGNESIUM: CPT

## 2025-01-19 PROCEDURE — 80053 COMPREHEN METABOLIC PANEL: CPT

## 2025-01-19 RX ORDER — FUROSEMIDE 10 MG/ML
20 INJECTION INTRAMUSCULAR; INTRAVENOUS ONCE
Status: DISCONTINUED | OUTPATIENT
Start: 2025-01-19 | End: 2025-01-19 | Stop reason: CLARIF

## 2025-01-19 RX ORDER — POTASSIUM CHLORIDE 1500 MG/1
20 TABLET, EXTENDED RELEASE ORAL ONCE
Status: DISCONTINUED | OUTPATIENT
Start: 2025-01-19 | End: 2025-01-19 | Stop reason: CLARIF

## 2025-01-19 RX ORDER — INSULIN GLARGINE 100 [IU]/ML
45 INJECTION, SOLUTION SUBCUTANEOUS DAILY
Status: DISCONTINUED | OUTPATIENT
Start: 2025-01-19 | End: 2025-01-22 | Stop reason: HOSPADM

## 2025-01-19 RX ORDER — METOPROLOL TARTRATE 25 MG/1
12.5 TABLET, FILM COATED ORAL 2 TIMES DAILY
Status: DISCONTINUED | OUTPATIENT
Start: 2025-01-19 | End: 2025-01-20

## 2025-01-19 RX ORDER — ALPRAZOLAM 0.25 MG/1
0.25 TABLET ORAL 3 TIMES DAILY PRN
Status: DISCONTINUED | OUTPATIENT
Start: 2025-01-19 | End: 2025-01-19 | Stop reason: CLARIF

## 2025-01-19 RX ORDER — METOPROLOL TARTRATE 25 MG/1
25 TABLET, FILM COATED ORAL EVERY 8 HOURS
Status: DISCONTINUED | OUTPATIENT
Start: 2025-01-19 | End: 2025-01-19 | Stop reason: CLARIF

## 2025-01-19 RX ORDER — MAGNESIUM SULFATE 1 G/100ML
1000 INJECTION INTRAVENOUS ONCE
Status: DISCONTINUED | OUTPATIENT
Start: 2025-01-19 | End: 2025-01-19 | Stop reason: CLARIF

## 2025-01-19 RX ADMIN — METOPROLOL TARTRATE 12.5 MG: 25 TABLET, FILM COATED ORAL at 08:38

## 2025-01-19 RX ADMIN — ACETYLCYSTEINE 600 MG: 200 SOLUTION ORAL; RESPIRATORY (INHALATION) at 16:21

## 2025-01-19 RX ADMIN — Medication 5 ML: at 02:52

## 2025-01-19 RX ADMIN — INSULIN LISPRO 3 UNITS: 100 INJECTION, SOLUTION INTRAVENOUS; SUBCUTANEOUS at 08:48

## 2025-01-19 RX ADMIN — INSULIN LISPRO 6 UNITS: 100 INJECTION, SOLUTION INTRAVENOUS; SUBCUTANEOUS at 23:17

## 2025-01-19 RX ADMIN — ALBUTEROL SULFATE 2.5 MG: 2.5 SOLUTION RESPIRATORY (INHALATION) at 08:51

## 2025-01-19 RX ADMIN — BUMETANIDE 1 MG/HR: 0.25 INJECTION INTRAMUSCULAR; INTRAVENOUS at 23:20

## 2025-01-19 RX ADMIN — AMIODARONE HYDROCHLORIDE 400 MG: 200 TABLET ORAL at 20:27

## 2025-01-19 RX ADMIN — INSULIN GLARGINE 30 UNITS: 100 INJECTION, SOLUTION SUBCUTANEOUS at 08:38

## 2025-01-19 RX ADMIN — PANTOPRAZOLE SODIUM 40 MG: 40 TABLET, DELAYED RELEASE ORAL at 07:27

## 2025-01-19 RX ADMIN — POLYETHYLENE GLYCOL 3350 17 G: 17 POWDER, FOR SOLUTION ORAL at 08:48

## 2025-01-19 RX ADMIN — ALBUTEROL SULFATE 2.5 MG: 2.5 SOLUTION RESPIRATORY (INHALATION) at 16:21

## 2025-01-19 RX ADMIN — SODIUM CHLORIDE 125 MG: 9 INJECTION, SOLUTION INTRAVENOUS at 13:16

## 2025-01-19 RX ADMIN — PIPERACILLIN AND TAZOBACTAM 4500 MG: 4; .5 INJECTION, POWDER, FOR SOLUTION INTRAVENOUS at 20:24

## 2025-01-19 RX ADMIN — OXYCODONE 10 MG: 5 TABLET ORAL at 02:30

## 2025-01-19 RX ADMIN — AMIODARONE HYDROCHLORIDE 400 MG: 200 TABLET ORAL at 08:38

## 2025-01-19 RX ADMIN — OXYCODONE 10 MG: 5 TABLET ORAL at 08:37

## 2025-01-19 RX ADMIN — INSULIN GLARGINE 15 UNITS: 100 INJECTION, SOLUTION SUBCUTANEOUS at 13:02

## 2025-01-19 RX ADMIN — ASPIRIN 81 MG: 81 TABLET, COATED ORAL at 08:37

## 2025-01-19 RX ADMIN — INSULIN LISPRO 2 UNITS: 100 INJECTION, SOLUTION INTRAVENOUS; SUBCUTANEOUS at 16:50

## 2025-01-19 RX ADMIN — BUMETANIDE 1 MG/HR: 0.25 INJECTION INTRAMUSCULAR; INTRAVENOUS at 08:54

## 2025-01-19 RX ADMIN — Medication 5 ML: at 22:24

## 2025-01-19 RX ADMIN — MUPIROCIN: 20 OINTMENT TOPICAL at 08:52

## 2025-01-19 RX ADMIN — POTASSIUM CHLORIDE 20 MEQ: 10 TABLET, EXTENDED RELEASE ORAL at 08:47

## 2025-01-19 RX ADMIN — METOPROLOL TARTRATE 12.5 MG: 25 TABLET, FILM COATED ORAL at 20:27

## 2025-01-19 RX ADMIN — AMIODARONE HYDROCHLORIDE 400 MG: 200 TABLET ORAL at 15:19

## 2025-01-19 RX ADMIN — HEPARIN SODIUM 7500 UNITS: 5000 INJECTION INTRAVENOUS; SUBCUTANEOUS at 13:02

## 2025-01-19 RX ADMIN — INSULIN LISPRO 2 UNITS: 100 INJECTION, SOLUTION INTRAVENOUS; SUBCUTANEOUS at 13:04

## 2025-01-19 RX ADMIN — GUAIFENESIN 600 MG: 600 TABLET, MULTILAYER, EXTENDED RELEASE ORAL at 08:38

## 2025-01-19 RX ADMIN — ALBUTEROL SULFATE 2.5 MG: 2.5 SOLUTION RESPIRATORY (INHALATION) at 13:18

## 2025-01-19 RX ADMIN — INSULIN LISPRO 2 UNITS: 100 INJECTION, SOLUTION INTRAVENOUS; SUBCUTANEOUS at 10:12

## 2025-01-19 RX ADMIN — GUAIFENESIN 600 MG: 600 TABLET, MULTILAYER, EXTENDED RELEASE ORAL at 20:27

## 2025-01-19 RX ADMIN — OXYCODONE 10 MG: 5 TABLET ORAL at 22:24

## 2025-01-19 RX ADMIN — HEPARIN SODIUM 7500 UNITS: 5000 INJECTION INTRAVENOUS; SUBCUTANEOUS at 22:26

## 2025-01-19 RX ADMIN — ACETAMINOPHEN 1000 MG: 500 TABLET ORAL at 13:04

## 2025-01-19 RX ADMIN — SENNOSIDES AND DOCUSATE SODIUM 1 TABLET: 50; 8.6 TABLET ORAL at 08:49

## 2025-01-19 RX ADMIN — Medication 10 ML: at 08:52

## 2025-01-19 RX ADMIN — SENNOSIDES AND DOCUSATE SODIUM 1 TABLET: 50; 8.6 TABLET ORAL at 20:27

## 2025-01-19 RX ADMIN — ACETAMINOPHEN 1000 MG: 500 TABLET ORAL at 22:25

## 2025-01-19 RX ADMIN — PIPERACILLIN AND TAZOBACTAM 4500 MG: 4; .5 INJECTION, POWDER, FOR SOLUTION INTRAVENOUS at 04:52

## 2025-01-19 RX ADMIN — Medication 10 ML: at 23:19

## 2025-01-19 RX ADMIN — POTASSIUM CHLORIDE 20 MEQ: 10 TABLET, EXTENDED RELEASE ORAL at 22:25

## 2025-01-19 RX ADMIN — TAMSULOSIN HYDROCHLORIDE 0.4 MG: 0.4 CAPSULE ORAL at 08:38

## 2025-01-19 RX ADMIN — ACETAMINOPHEN 1000 MG: 500 TABLET ORAL at 08:38

## 2025-01-19 RX ADMIN — ATORVASTATIN CALCIUM 80 MG: 80 TABLET, FILM COATED ORAL at 20:27

## 2025-01-19 RX ADMIN — ALBUTEROL SULFATE 2.5 MG: 2.5 SOLUTION RESPIRATORY (INHALATION) at 21:29

## 2025-01-19 RX ADMIN — PIPERACILLIN AND TAZOBACTAM 4500 MG: 4; .5 INJECTION, POWDER, FOR SOLUTION INTRAVENOUS at 12:51

## 2025-01-19 RX ADMIN — ACETYLCYSTEINE 600 MG: 200 SOLUTION ORAL; RESPIRATORY (INHALATION) at 08:51

## 2025-01-19 RX ADMIN — HEPARIN SODIUM 7500 UNITS: 5000 INJECTION INTRAVENOUS; SUBCUTANEOUS at 07:25

## 2025-01-19 RX ADMIN — ACETYLCYSTEINE 600 MG: 200 SOLUTION ORAL; RESPIRATORY (INHALATION) at 21:30

## 2025-01-19 RX ADMIN — Medication 5 ML: at 10:04

## 2025-01-19 RX ADMIN — POTASSIUM CHLORIDE 20 MEQ: 10 TABLET, EXTENDED RELEASE ORAL at 16:01

## 2025-01-19 RX ADMIN — OXYCODONE 10 MG: 5 TABLET ORAL at 17:08

## 2025-01-19 ASSESSMENT — PAIN SCALES - GENERAL
PAINLEVEL_OUTOF10: 8
PAINLEVEL_OUTOF10: 9
PAINLEVEL_OUTOF10: 8
PAINLEVEL_OUTOF10: 5
PAINLEVEL_OUTOF10: 4
PAINLEVEL_OUTOF10: 5

## 2025-01-19 ASSESSMENT — PAIN DESCRIPTION - LOCATION
LOCATION: CHEST

## 2025-01-19 ASSESSMENT — PAIN DESCRIPTION - DESCRIPTORS
DESCRIPTORS: ACHING
DESCRIPTORS: SHARP

## 2025-01-19 ASSESSMENT — PAIN DESCRIPTION - ORIENTATION: ORIENTATION: MID

## 2025-01-20 ENCOUNTER — APPOINTMENT (OUTPATIENT)
Dept: GENERAL RADIOLOGY | Age: 60
DRG: 216 | End: 2025-01-20
Payer: COMMERCIAL

## 2025-01-20 LAB
ALBUMIN SERPL-MCNC: 3.2 G/DL (ref 3.4–5)
ALBUMIN/GLOB SERPL: 1.3 {RATIO} (ref 1.1–2.2)
ALP SERPL-CCNC: 270 U/L (ref 40–129)
ALT SERPL-CCNC: 469 U/L (ref 10–40)
ANION GAP SERPL CALCULATED.3IONS-SCNC: 11 MMOL/L (ref 3–16)
ANION GAP SERPL CALCULATED.3IONS-SCNC: 9 MMOL/L (ref 3–16)
AST SERPL-CCNC: 691 U/L (ref 15–37)
BILIRUB SERPL-MCNC: 0.9 MG/DL (ref 0–1)
BUN SERPL-MCNC: 40 MG/DL (ref 7–20)
BUN SERPL-MCNC: 46 MG/DL (ref 7–20)
CALCIUM SERPL-MCNC: 8.3 MG/DL (ref 8.3–10.6)
CALCIUM SERPL-MCNC: 8.6 MG/DL (ref 8.3–10.6)
CHLORIDE SERPL-SCNC: 94 MMOL/L (ref 99–110)
CHLORIDE SERPL-SCNC: 95 MMOL/L (ref 99–110)
CO2 SERPL-SCNC: 29 MMOL/L (ref 21–32)
CO2 SERPL-SCNC: 30 MMOL/L (ref 21–32)
CREAT SERPL-MCNC: 1.5 MG/DL (ref 0.8–1.3)
CREAT SERPL-MCNC: 1.5 MG/DL (ref 0.8–1.3)
DEPRECATED RDW RBC AUTO: 13.6 % (ref 12.4–15.4)
GFR SERPLBLD CREATININE-BSD FMLA CKD-EPI: 53 ML/MIN/{1.73_M2}
GFR SERPLBLD CREATININE-BSD FMLA CKD-EPI: 53 ML/MIN/{1.73_M2}
GLUCOSE BLD-MCNC: 137 MG/DL (ref 70–99)
GLUCOSE BLD-MCNC: 152 MG/DL (ref 70–99)
GLUCOSE BLD-MCNC: 173 MG/DL (ref 70–99)
GLUCOSE BLD-MCNC: 224 MG/DL (ref 70–99)
GLUCOSE SERPL-MCNC: 121 MG/DL (ref 70–99)
GLUCOSE SERPL-MCNC: 164 MG/DL (ref 70–99)
HCT VFR BLD AUTO: 26.8 % (ref 40.5–52.5)
HGB BLD-MCNC: 9 G/DL (ref 13.5–17.5)
MAGNESIUM SERPL-MCNC: 2.18 MG/DL (ref 1.8–2.4)
MCH RBC QN AUTO: 31.4 PG (ref 26–34)
MCHC RBC AUTO-ENTMCNC: 33.5 G/DL (ref 31–36)
MCV RBC AUTO: 93.7 FL (ref 80–100)
PERFORMED ON: ABNORMAL
PLATELET # BLD AUTO: 116 K/UL (ref 135–450)
PMV BLD AUTO: 10.3 FL (ref 5–10.5)
POTASSIUM SERPL-SCNC: 3.9 MMOL/L (ref 3.5–5.1)
PROT SERPL-MCNC: 5.7 G/DL (ref 6.4–8.2)
RBC # BLD AUTO: 2.86 M/UL (ref 4.2–5.9)
SODIUM SERPL-SCNC: 134 MMOL/L (ref 136–145)
SODIUM SERPL-SCNC: 134 MMOL/L (ref 136–145)
WBC # BLD AUTO: 14.9 K/UL (ref 4–11)

## 2025-01-20 PROCEDURE — 94668 MNPJ CHEST WALL SBSQ: CPT

## 2025-01-20 PROCEDURE — 2500000003 HC RX 250 WO HCPCS

## 2025-01-20 PROCEDURE — 2580000003 HC RX 258: Performed by: THORACIC SURGERY (CARDIOTHORACIC VASCULAR SURGERY)

## 2025-01-20 PROCEDURE — 99024 POSTOP FOLLOW-UP VISIT: CPT | Performed by: NURSE PRACTITIONER

## 2025-01-20 PROCEDURE — 94669 MECHANICAL CHEST WALL OSCILL: CPT

## 2025-01-20 PROCEDURE — 6370000000 HC RX 637 (ALT 250 FOR IP): Performed by: NURSE PRACTITIONER

## 2025-01-20 PROCEDURE — 6360000002 HC RX W HCPCS: Performed by: THORACIC SURGERY (CARDIOTHORACIC VASCULAR SURGERY)

## 2025-01-20 PROCEDURE — 2000000000 HC ICU R&B

## 2025-01-20 PROCEDURE — 2700000000 HC OXYGEN THERAPY PER DAY

## 2025-01-20 PROCEDURE — 83735 ASSAY OF MAGNESIUM: CPT

## 2025-01-20 PROCEDURE — 2580000003 HC RX 258: Performed by: NURSE PRACTITIONER

## 2025-01-20 PROCEDURE — 6360000002 HC RX W HCPCS: Performed by: NURSE PRACTITIONER

## 2025-01-20 PROCEDURE — 94640 AIRWAY INHALATION TREATMENT: CPT

## 2025-01-20 PROCEDURE — 97530 THERAPEUTIC ACTIVITIES: CPT

## 2025-01-20 PROCEDURE — 6370000000 HC RX 637 (ALT 250 FOR IP): Performed by: THORACIC SURGERY (CARDIOTHORACIC VASCULAR SURGERY)

## 2025-01-20 PROCEDURE — 6370000000 HC RX 637 (ALT 250 FOR IP)

## 2025-01-20 PROCEDURE — 85027 COMPLETE CBC AUTOMATED: CPT

## 2025-01-20 PROCEDURE — 71045 X-RAY EXAM CHEST 1 VIEW: CPT

## 2025-01-20 PROCEDURE — 84132 ASSAY OF SERUM POTASSIUM: CPT

## 2025-01-20 PROCEDURE — 94761 N-INVAS EAR/PLS OXIMETRY MLT: CPT

## 2025-01-20 PROCEDURE — 80053 COMPREHEN METABOLIC PANEL: CPT

## 2025-01-20 PROCEDURE — 6360000002 HC RX W HCPCS

## 2025-01-20 RX ORDER — METOPROLOL SUCCINATE 25 MG/1
12.5 TABLET, EXTENDED RELEASE ORAL 2 TIMES DAILY
Status: DISCONTINUED | OUTPATIENT
Start: 2025-01-20 | End: 2025-01-22 | Stop reason: HOSPADM

## 2025-01-20 RX ORDER — POTASSIUM CHLORIDE 1500 MG/1
40 TABLET, EXTENDED RELEASE ORAL ONCE
Status: COMPLETED | OUTPATIENT
Start: 2025-01-20 | End: 2025-01-20

## 2025-01-20 RX ORDER — POTASSIUM CHLORIDE 1500 MG/1
40 TABLET, EXTENDED RELEASE ORAL 2 TIMES DAILY WITH MEALS
Status: DISCONTINUED | OUTPATIENT
Start: 2025-01-20 | End: 2025-01-21

## 2025-01-20 RX ORDER — BUMETANIDE 1 MG/1
1 TABLET ORAL ONCE
Status: COMPLETED | OUTPATIENT
Start: 2025-01-20 | End: 2025-01-20

## 2025-01-20 RX ORDER — LACTULOSE 10 G/15ML
10 SOLUTION ORAL
Status: DISCONTINUED | OUTPATIENT
Start: 2025-01-20 | End: 2025-01-22

## 2025-01-20 RX ADMIN — AMIODARONE HYDROCHLORIDE 400 MG: 200 TABLET ORAL at 22:41

## 2025-01-20 RX ADMIN — BUMETANIDE 1 MG: 1 TABLET ORAL at 16:49

## 2025-01-20 RX ADMIN — INSULIN LISPRO 3 UNITS: 100 INJECTION, SOLUTION INTRAVENOUS; SUBCUTANEOUS at 22:42

## 2025-01-20 RX ADMIN — ALBUTEROL SULFATE 2.5 MG: 2.5 SOLUTION RESPIRATORY (INHALATION) at 08:21

## 2025-01-20 RX ADMIN — GUAIFENESIN 600 MG: 600 TABLET, MULTILAYER, EXTENDED RELEASE ORAL at 22:41

## 2025-01-20 RX ADMIN — AMIODARONE HYDROCHLORIDE 400 MG: 200 TABLET ORAL at 14:01

## 2025-01-20 RX ADMIN — GUAIFENESIN 600 MG: 600 TABLET, MULTILAYER, EXTENDED RELEASE ORAL at 09:26

## 2025-01-20 RX ADMIN — INSULIN LISPRO 6 UNITS: 100 INJECTION, SOLUTION INTRAVENOUS; SUBCUTANEOUS at 11:43

## 2025-01-20 RX ADMIN — POTASSIUM CHLORIDE 40 MEQ: 1500 TABLET, EXTENDED RELEASE ORAL at 16:49

## 2025-01-20 RX ADMIN — AMOXICILLIN AND CLAVULANATE POTASSIUM 1 TABLET: 875; 125 TABLET, FILM COATED ORAL at 22:41

## 2025-01-20 RX ADMIN — POTASSIUM CHLORIDE 40 MEQ: 1500 TABLET, EXTENDED RELEASE ORAL at 11:11

## 2025-01-20 RX ADMIN — ASPIRIN 81 MG: 81 TABLET, COATED ORAL at 09:26

## 2025-01-20 RX ADMIN — HEPARIN SODIUM 7500 UNITS: 5000 INJECTION INTRAVENOUS; SUBCUTANEOUS at 05:41

## 2025-01-20 RX ADMIN — Medication 10 ML: at 22:52

## 2025-01-20 RX ADMIN — PIPERACILLIN AND TAZOBACTAM 4500 MG: 4; .5 INJECTION, POWDER, FOR SOLUTION INTRAVENOUS at 06:59

## 2025-01-20 RX ADMIN — METOPROLOL TARTRATE 12.5 MG: 25 TABLET, FILM COATED ORAL at 09:26

## 2025-01-20 RX ADMIN — POLYETHYLENE GLYCOL 3350 17 G: 17 POWDER, FOR SOLUTION ORAL at 09:26

## 2025-01-20 RX ADMIN — Medication 10 ML: at 09:28

## 2025-01-20 RX ADMIN — Medication 5 ML: at 04:03

## 2025-01-20 RX ADMIN — ACETYLCYSTEINE 600 MG: 200 SOLUTION ORAL; RESPIRATORY (INHALATION) at 13:21

## 2025-01-20 RX ADMIN — ALBUTEROL SULFATE 2.5 MG: 2.5 SOLUTION RESPIRATORY (INHALATION) at 17:03

## 2025-01-20 RX ADMIN — ALBUTEROL SULFATE 2.5 MG: 2.5 SOLUTION RESPIRATORY (INHALATION) at 13:21

## 2025-01-20 RX ADMIN — ALBUTEROL SULFATE 2.5 MG: 2.5 SOLUTION RESPIRATORY (INHALATION) at 20:27

## 2025-01-20 RX ADMIN — PANTOPRAZOLE SODIUM 40 MG: 40 TABLET, DELAYED RELEASE ORAL at 09:26

## 2025-01-20 RX ADMIN — AMIODARONE HYDROCHLORIDE 400 MG: 200 TABLET ORAL at 09:26

## 2025-01-20 RX ADMIN — INSULIN LISPRO 3 UNITS: 100 INJECTION, SOLUTION INTRAVENOUS; SUBCUTANEOUS at 09:27

## 2025-01-20 RX ADMIN — LACTULOSE 10 G: 10 SOLUTION ORAL at 11:10

## 2025-01-20 RX ADMIN — ACETYLCYSTEINE 600 MG: 200 SOLUTION ORAL; RESPIRATORY (INHALATION) at 20:27

## 2025-01-20 RX ADMIN — DIPHENHYDRAMINE HYDROCHLORIDE 12.5 MG: 25 TABLET ORAL at 22:41

## 2025-01-20 RX ADMIN — ACETAMINOPHEN 1000 MG: 500 TABLET ORAL at 22:40

## 2025-01-20 RX ADMIN — POTASSIUM CHLORIDE 20 MEQ: 10 TABLET, EXTENDED RELEASE ORAL at 05:40

## 2025-01-20 RX ADMIN — SENNOSIDES AND DOCUSATE SODIUM 1 TABLET: 50; 8.6 TABLET ORAL at 09:26

## 2025-01-20 RX ADMIN — POTASSIUM CHLORIDE 40 MEQ: 1500 TABLET, EXTENDED RELEASE ORAL at 11:09

## 2025-01-20 RX ADMIN — SODIUM CHLORIDE 125 MG: 9 INJECTION, SOLUTION INTRAVENOUS at 14:00

## 2025-01-20 RX ADMIN — INSULIN GLARGINE 45 UNITS: 100 INJECTION, SOLUTION SUBCUTANEOUS at 09:27

## 2025-01-20 RX ADMIN — ACETAMINOPHEN 1000 MG: 500 TABLET ORAL at 14:01

## 2025-01-20 RX ADMIN — HEPARIN SODIUM 7500 UNITS: 5000 INJECTION INTRAVENOUS; SUBCUTANEOUS at 22:44

## 2025-01-20 RX ADMIN — HEPARIN SODIUM 7500 UNITS: 5000 INJECTION INTRAVENOUS; SUBCUTANEOUS at 14:02

## 2025-01-20 RX ADMIN — ACETAMINOPHEN 1000 MG: 500 TABLET ORAL at 05:40

## 2025-01-20 RX ADMIN — BUMETANIDE 1 MG: 1 TABLET ORAL at 11:10

## 2025-01-20 RX ADMIN — AMOXICILLIN AND CLAVULANATE POTASSIUM 1 TABLET: 875; 125 TABLET, FILM COATED ORAL at 11:14

## 2025-01-20 RX ADMIN — ATORVASTATIN CALCIUM 80 MG: 80 TABLET, FILM COATED ORAL at 22:41

## 2025-01-20 RX ADMIN — TAMSULOSIN HYDROCHLORIDE 0.4 MG: 0.4 CAPSULE ORAL at 09:26

## 2025-01-20 RX ADMIN — ACETYLCYSTEINE 600 MG: 200 SOLUTION ORAL; RESPIRATORY (INHALATION) at 08:21

## 2025-01-20 ASSESSMENT — PAIN SCALES - GENERAL
PAINLEVEL_OUTOF10: 0
PAINLEVEL_OUTOF10: 6
PAINLEVEL_OUTOF10: 5
PAINLEVEL_OUTOF10: 0
PAINLEVEL_OUTOF10: 5

## 2025-01-20 ASSESSMENT — PAIN DESCRIPTION - DESCRIPTORS: DESCRIPTORS: ACHING

## 2025-01-20 ASSESSMENT — PAIN DESCRIPTION - LOCATION: LOCATION: CHEST

## 2025-01-21 ENCOUNTER — APPOINTMENT (OUTPATIENT)
Dept: GENERAL RADIOLOGY | Age: 60
DRG: 216 | End: 2025-01-21
Payer: COMMERCIAL

## 2025-01-21 ENCOUNTER — APPOINTMENT (OUTPATIENT)
Dept: VASCULAR LAB | Age: 60
DRG: 216 | End: 2025-01-21
Payer: COMMERCIAL

## 2025-01-21 LAB
ANION GAP SERPL CALCULATED.3IONS-SCNC: 8 MMOL/L (ref 3–16)
BUN SERPL-MCNC: 39 MG/DL (ref 7–20)
CALCIUM SERPL-MCNC: 8.3 MG/DL (ref 8.3–10.6)
CHLORIDE SERPL-SCNC: 94 MMOL/L (ref 99–110)
CO2 SERPL-SCNC: 29 MMOL/L (ref 21–32)
CREAT SERPL-MCNC: 1.3 MG/DL (ref 0.8–1.3)
DEPRECATED RDW RBC AUTO: 13.5 % (ref 12.4–15.4)
GFR SERPLBLD CREATININE-BSD FMLA CKD-EPI: 63 ML/MIN/{1.73_M2}
GLUCOSE BLD-MCNC: 114 MG/DL (ref 70–99)
GLUCOSE BLD-MCNC: 130 MG/DL (ref 70–99)
GLUCOSE BLD-MCNC: 152 MG/DL (ref 70–99)
GLUCOSE BLD-MCNC: 196 MG/DL (ref 70–99)
GLUCOSE SERPL-MCNC: 100 MG/DL (ref 70–99)
HCT VFR BLD AUTO: 27.1 % (ref 40.5–52.5)
HGB BLD-MCNC: 9.2 G/DL (ref 13.5–17.5)
MAGNESIUM SERPL-MCNC: 2.14 MG/DL (ref 1.8–2.4)
MCH RBC QN AUTO: 31.7 PG (ref 26–34)
MCHC RBC AUTO-ENTMCNC: 33.9 G/DL (ref 31–36)
MCV RBC AUTO: 93.2 FL (ref 80–100)
PERFORMED ON: ABNORMAL
PLATELET # BLD AUTO: 115 K/UL (ref 135–450)
PMV BLD AUTO: 10 FL (ref 5–10.5)
POTASSIUM SERPL-SCNC: 3.6 MMOL/L (ref 3.5–5.1)
POTASSIUM SERPL-SCNC: 3.8 MMOL/L (ref 3.5–5.1)
POTASSIUM SERPL-SCNC: 3.8 MMOL/L (ref 3.5–5.1)
POTASSIUM SERPL-SCNC: ABNORMAL MMOL/L (ref 3.5–5.1)
RBC # BLD AUTO: 2.91 M/UL (ref 4.2–5.9)
SODIUM SERPL-SCNC: 131 MMOL/L (ref 136–145)
WBC # BLD AUTO: 16 K/UL (ref 4–11)

## 2025-01-21 PROCEDURE — 6370000000 HC RX 637 (ALT 250 FOR IP): Performed by: NURSE PRACTITIONER

## 2025-01-21 PROCEDURE — 6370000000 HC RX 637 (ALT 250 FOR IP): Performed by: THORACIC SURGERY (CARDIOTHORACIC VASCULAR SURGERY)

## 2025-01-21 PROCEDURE — 93970 EXTREMITY STUDY: CPT

## 2025-01-21 PROCEDURE — 6370000000 HC RX 637 (ALT 250 FOR IP)

## 2025-01-21 PROCEDURE — 2000000000 HC ICU R&B

## 2025-01-21 PROCEDURE — 6360000002 HC RX W HCPCS: Performed by: THORACIC SURGERY (CARDIOTHORACIC VASCULAR SURGERY)

## 2025-01-21 PROCEDURE — 80048 BASIC METABOLIC PNL TOTAL CA: CPT

## 2025-01-21 PROCEDURE — 94761 N-INVAS EAR/PLS OXIMETRY MLT: CPT

## 2025-01-21 PROCEDURE — 99024 POSTOP FOLLOW-UP VISIT: CPT | Performed by: PHYSICIAN ASSISTANT

## 2025-01-21 PROCEDURE — 6370000000 HC RX 637 (ALT 250 FOR IP): Performed by: PHYSICIAN ASSISTANT

## 2025-01-21 PROCEDURE — 2580000003 HC RX 258: Performed by: THORACIC SURGERY (CARDIOTHORACIC VASCULAR SURGERY)

## 2025-01-21 PROCEDURE — 2500000003 HC RX 250 WO HCPCS

## 2025-01-21 PROCEDURE — 94640 AIRWAY INHALATION TREATMENT: CPT

## 2025-01-21 PROCEDURE — 6360000002 HC RX W HCPCS

## 2025-01-21 PROCEDURE — 6360000002 HC RX W HCPCS: Performed by: PHYSICIAN ASSISTANT

## 2025-01-21 PROCEDURE — 71045 X-RAY EXAM CHEST 1 VIEW: CPT

## 2025-01-21 PROCEDURE — 94669 MECHANICAL CHEST WALL OSCILL: CPT

## 2025-01-21 PROCEDURE — 84132 ASSAY OF SERUM POTASSIUM: CPT

## 2025-01-21 PROCEDURE — 85027 COMPLETE CBC AUTOMATED: CPT

## 2025-01-21 PROCEDURE — 83735 ASSAY OF MAGNESIUM: CPT

## 2025-01-21 PROCEDURE — 36415 COLL VENOUS BLD VENIPUNCTURE: CPT

## 2025-01-21 PROCEDURE — 94668 MNPJ CHEST WALL SBSQ: CPT

## 2025-01-21 RX ORDER — BUMETANIDE 0.25 MG/ML
1 INJECTION, SOLUTION INTRAMUSCULAR; INTRAVENOUS ONCE
Status: COMPLETED | OUTPATIENT
Start: 2025-01-21 | End: 2025-01-21

## 2025-01-21 RX ORDER — BUMETANIDE 0.25 MG/ML
1 INJECTION, SOLUTION INTRAMUSCULAR; INTRAVENOUS EVERY 6 HOURS
Status: DISCONTINUED | OUTPATIENT
Start: 2025-01-21 | End: 2025-01-22 | Stop reason: HOSPADM

## 2025-01-21 RX ORDER — CLOPIDOGREL BISULFATE 75 MG/1
75 TABLET ORAL DAILY
Status: DISCONTINUED | OUTPATIENT
Start: 2025-01-21 | End: 2025-01-22 | Stop reason: HOSPADM

## 2025-01-21 RX ORDER — POTASSIUM CHLORIDE 1500 MG/1
40 TABLET, EXTENDED RELEASE ORAL EVERY 6 HOURS
Status: DISCONTINUED | OUTPATIENT
Start: 2025-01-21 | End: 2025-01-22 | Stop reason: HOSPADM

## 2025-01-21 RX ADMIN — ALBUTEROL SULFATE 2.5 MG: 2.5 SOLUTION RESPIRATORY (INHALATION) at 20:50

## 2025-01-21 RX ADMIN — POTASSIUM CHLORIDE 40 MEQ: 1500 TABLET, EXTENDED RELEASE ORAL at 11:08

## 2025-01-21 RX ADMIN — ALBUTEROL SULFATE 2.5 MG: 2.5 SOLUTION RESPIRATORY (INHALATION) at 16:30

## 2025-01-21 RX ADMIN — ACETAMINOPHEN 1000 MG: 500 TABLET ORAL at 06:39

## 2025-01-21 RX ADMIN — BUMETANIDE 1 MG: 0.25 INJECTION INTRAMUSCULAR; INTRAVENOUS at 11:39

## 2025-01-21 RX ADMIN — Medication 10 ML: at 08:23

## 2025-01-21 RX ADMIN — OXYCODONE 10 MG: 5 TABLET ORAL at 14:15

## 2025-01-21 RX ADMIN — POTASSIUM CHLORIDE 40 MEQ: 1500 TABLET, EXTENDED RELEASE ORAL at 08:11

## 2025-01-21 RX ADMIN — ATORVASTATIN CALCIUM 80 MG: 80 TABLET, FILM COATED ORAL at 20:44

## 2025-01-21 RX ADMIN — AMIODARONE HYDROCHLORIDE 400 MG: 200 TABLET ORAL at 20:44

## 2025-01-21 RX ADMIN — POTASSIUM CHLORIDE 40 MEQ: 1500 TABLET, EXTENDED RELEASE ORAL at 23:23

## 2025-01-21 RX ADMIN — HEPARIN SODIUM 7500 UNITS: 5000 INJECTION INTRAVENOUS; SUBCUTANEOUS at 20:42

## 2025-01-21 RX ADMIN — ACETAMINOPHEN 1000 MG: 500 TABLET ORAL at 14:15

## 2025-01-21 RX ADMIN — ACETYLCYSTEINE 600 MG: 200 SOLUTION ORAL; RESPIRATORY (INHALATION) at 20:51

## 2025-01-21 RX ADMIN — PANTOPRAZOLE SODIUM 40 MG: 40 TABLET, DELAYED RELEASE ORAL at 08:11

## 2025-01-21 RX ADMIN — INSULIN LISPRO 3 UNITS: 100 INJECTION, SOLUTION INTRAVENOUS; SUBCUTANEOUS at 20:44

## 2025-01-21 RX ADMIN — ALBUTEROL SULFATE 2.5 MG: 2.5 SOLUTION RESPIRATORY (INHALATION) at 12:27

## 2025-01-21 RX ADMIN — DIPHENHYDRAMINE HYDROCHLORIDE 12.5 MG: 25 TABLET ORAL at 20:43

## 2025-01-21 RX ADMIN — AMIODARONE HYDROCHLORIDE 400 MG: 200 TABLET ORAL at 14:15

## 2025-01-21 RX ADMIN — ALBUTEROL SULFATE 2.5 MG: 2.5 SOLUTION RESPIRATORY (INHALATION) at 07:44

## 2025-01-21 RX ADMIN — AMOXICILLIN AND CLAVULANATE POTASSIUM 1 TABLET: 875; 125 TABLET, FILM COATED ORAL at 08:10

## 2025-01-21 RX ADMIN — TAMSULOSIN HYDROCHLORIDE 0.4 MG: 0.4 CAPSULE ORAL at 08:11

## 2025-01-21 RX ADMIN — INSULIN GLARGINE 45 UNITS: 100 INJECTION, SOLUTION SUBCUTANEOUS at 08:09

## 2025-01-21 RX ADMIN — ACETYLCYSTEINE 600 MG: 200 SOLUTION ORAL; RESPIRATORY (INHALATION) at 07:44

## 2025-01-21 RX ADMIN — CLOPIDOGREL BISULFATE 75 MG: 75 TABLET ORAL at 16:23

## 2025-01-21 RX ADMIN — POTASSIUM CHLORIDE 40 MEQ: 1500 TABLET, EXTENDED RELEASE ORAL at 20:42

## 2025-01-21 RX ADMIN — ASPIRIN 81 MG: 81 TABLET, COATED ORAL at 08:11

## 2025-01-21 RX ADMIN — GUAIFENESIN 600 MG: 600 TABLET, MULTILAYER, EXTENDED RELEASE ORAL at 20:43

## 2025-01-21 RX ADMIN — AMOXICILLIN AND CLAVULANATE POTASSIUM 1 TABLET: 875; 125 TABLET, FILM COATED ORAL at 20:43

## 2025-01-21 RX ADMIN — Medication 10 ML: at 23:01

## 2025-01-21 RX ADMIN — BUMETANIDE 1 MG: 0.25 INJECTION INTRAMUSCULAR; INTRAVENOUS at 16:23

## 2025-01-21 RX ADMIN — HEPARIN SODIUM 7500 UNITS: 5000 INJECTION INTRAVENOUS; SUBCUTANEOUS at 14:16

## 2025-01-21 RX ADMIN — ACETYLCYSTEINE 600 MG: 200 SOLUTION ORAL; RESPIRATORY (INHALATION) at 16:30

## 2025-01-21 RX ADMIN — BUMETANIDE 1 MG: 0.25 INJECTION INTRAMUSCULAR; INTRAVENOUS at 20:56

## 2025-01-21 RX ADMIN — OXYCODONE 10 MG: 5 TABLET ORAL at 09:13

## 2025-01-21 RX ADMIN — POTASSIUM CHLORIDE 40 MEQ: 1500 TABLET, EXTENDED RELEASE ORAL at 16:23

## 2025-01-21 RX ADMIN — SODIUM CHLORIDE 125 MG: 9 INJECTION, SOLUTION INTRAVENOUS at 12:56

## 2025-01-21 RX ADMIN — GUAIFENESIN 600 MG: 600 TABLET, MULTILAYER, EXTENDED RELEASE ORAL at 08:11

## 2025-01-21 RX ADMIN — INSULIN LISPRO 3 UNITS: 100 INJECTION, SOLUTION INTRAVENOUS; SUBCUTANEOUS at 16:49

## 2025-01-21 RX ADMIN — AMIODARONE HYDROCHLORIDE 400 MG: 200 TABLET ORAL at 08:11

## 2025-01-21 RX ADMIN — HEPARIN SODIUM 7500 UNITS: 5000 INJECTION INTRAVENOUS; SUBCUTANEOUS at 06:40

## 2025-01-21 RX ADMIN — OXYCODONE 10 MG: 5 TABLET ORAL at 20:43

## 2025-01-21 RX ADMIN — OXYCODONE 10 MG: 5 TABLET ORAL at 00:53

## 2025-01-21 ASSESSMENT — PAIN DESCRIPTION - DESCRIPTORS
DESCRIPTORS: ACHING;DISCOMFORT
DESCRIPTORS: ACHING;DISCOMFORT
DESCRIPTORS: ACHING;THROBBING;DISCOMFORT
DESCRIPTORS: ACHING;THROBBING;DISCOMFORT

## 2025-01-21 ASSESSMENT — PAIN SCALES - GENERAL
PAINLEVEL_OUTOF10: 2
PAINLEVEL_OUTOF10: 8
PAINLEVEL_OUTOF10: 8
PAINLEVEL_OUTOF10: 0
PAINLEVEL_OUTOF10: 8
PAINLEVEL_OUTOF10: 2
PAINLEVEL_OUTOF10: 8
PAINLEVEL_OUTOF10: 1
PAINLEVEL_OUTOF10: 0

## 2025-01-21 ASSESSMENT — PAIN DESCRIPTION - LOCATION
LOCATION: CHEST
LOCATION: CHEST
LOCATION: GENERALIZED

## 2025-01-21 ASSESSMENT — PAIN DESCRIPTION - ORIENTATION
ORIENTATION: MID
ORIENTATION: MID

## 2025-01-21 NOTE — PLAN OF CARE
Problem: Discharge Planning  Goal: Discharge to home or other facility with appropriate resources  Outcome: Progressing     Problem: Safety - Adult  Goal: Free from fall injury  Outcome: Progressing     Problem: Pain  Goal: Verbalizes/displays adequate comfort level or baseline comfort level  Outcome: Progressing     Problem: ABCDS Injury Assessment  Goal: Absence of physical injury  Outcome: Progressing     Problem: Nutrition Deficit:  Goal: Optimize nutritional status  Outcome: Progressing     Problem: Neurosensory - Adult  Goal: Achieves maximal functionality and self care  Outcome: Progressing     Problem: Respiratory - Adult  Goal: Achieves optimal ventilation and oxygenation  Outcome: Progressing     Problem: Cardiovascular - Adult  Goal: Maintains optimal cardiac output and hemodynamic stability  Outcome: Progressing  Goal: Absence of cardiac dysrhythmias or at baseline  Outcome: Progressing     Problem: Skin/Tissue Integrity - Adult  Goal: Skin integrity remains intact  Outcome: Progressing  Goal: Incisions, wounds, or drain sites healing without S/S of infection  Outcome: Progressing     Problem: Musculoskeletal - Adult  Goal: Return mobility to safest level of function  Outcome: Progressing  Goal: Maintain proper alignment of affected body part  Outcome: Progressing  Goal: Return ADL status to a safe level of function  Outcome: Progressing     Problem: Gastrointestinal - Adult  Goal: Maintains or returns to baseline bowel function  Outcome: Progressing  Goal: Maintains adequate nutritional intake  Outcome: Progressing     Problem: Genitourinary - Adult  Goal: Absence of urinary retention  Outcome: Progressing  Goal: Urinary catheter remains patent  Outcome: Progressing

## 2025-01-22 ENCOUNTER — APPOINTMENT (OUTPATIENT)
Dept: GENERAL RADIOLOGY | Age: 60
DRG: 216 | End: 2025-01-22
Payer: COMMERCIAL

## 2025-01-22 VITALS
SYSTOLIC BLOOD PRESSURE: 150 MMHG | OXYGEN SATURATION: 98 % | RESPIRATION RATE: 24 BRPM | WEIGHT: 279.98 LBS | HEIGHT: 69 IN | BODY MASS INDEX: 41.47 KG/M2 | TEMPERATURE: 98 F | DIASTOLIC BLOOD PRESSURE: 68 MMHG | HEART RATE: 80 BPM

## 2025-01-22 PROBLEM — R07.9 CHEST PAIN: Status: RESOLVED | Noted: 2025-01-14 | Resolved: 2025-01-22

## 2025-01-22 PROBLEM — I71.21 ANEURYSM OF ASCENDING AORTA WITHOUT RUPTURE (HCC): Status: RESOLVED | Noted: 2025-01-15 | Resolved: 2025-01-22

## 2025-01-22 PROBLEM — I48.91 ATRIAL FIBRILLATION WITH RAPID VENTRICULAR RESPONSE (HCC): Status: RESOLVED | Noted: 2025-01-13 | Resolved: 2025-01-22

## 2025-01-22 LAB
ANION GAP SERPL CALCULATED.3IONS-SCNC: 6 MMOL/L (ref 3–16)
BUN SERPL-MCNC: 25 MG/DL (ref 7–20)
CALCIUM SERPL-MCNC: 8.3 MG/DL (ref 8.3–10.6)
CHLORIDE SERPL-SCNC: 100 MMOL/L (ref 99–110)
CO2 SERPL-SCNC: 28 MMOL/L (ref 21–32)
CREAT SERPL-MCNC: 1.1 MG/DL (ref 0.8–1.3)
DEPRECATED RDW RBC AUTO: 13.8 % (ref 12.4–15.4)
ECHO BSA: 2.48 M2
GFR SERPLBLD CREATININE-BSD FMLA CKD-EPI: 76 ML/MIN/{1.73_M2}
GLUCOSE BLD-MCNC: 123 MG/DL (ref 70–99)
GLUCOSE SERPL-MCNC: 108 MG/DL (ref 70–99)
HCT VFR BLD AUTO: 27.5 % (ref 40.5–52.5)
HGB BLD-MCNC: 9.3 G/DL (ref 13.5–17.5)
MAGNESIUM SERPL-MCNC: 2.02 MG/DL (ref 1.8–2.4)
MCH RBC QN AUTO: 31.8 PG (ref 26–34)
MCHC RBC AUTO-ENTMCNC: 33.9 G/DL (ref 31–36)
MCV RBC AUTO: 93.7 FL (ref 80–100)
PERFORMED ON: ABNORMAL
PLATELET # BLD AUTO: 128 K/UL (ref 135–450)
PMV BLD AUTO: 8.9 FL (ref 5–10.5)
POTASSIUM SERPL-SCNC: 4.3 MMOL/L (ref 3.5–5.1)
RBC # BLD AUTO: 2.94 M/UL (ref 4.2–5.9)
SODIUM SERPL-SCNC: 134 MMOL/L (ref 136–145)
WBC # BLD AUTO: 14.5 K/UL (ref 4–11)

## 2025-01-22 PROCEDURE — 2500000003 HC RX 250 WO HCPCS

## 2025-01-22 PROCEDURE — 94640 AIRWAY INHALATION TREATMENT: CPT

## 2025-01-22 PROCEDURE — 93970 EXTREMITY STUDY: CPT | Performed by: INTERNAL MEDICINE

## 2025-01-22 PROCEDURE — 71045 X-RAY EXAM CHEST 1 VIEW: CPT

## 2025-01-22 PROCEDURE — 97535 SELF CARE MNGMENT TRAINING: CPT

## 2025-01-22 PROCEDURE — 6360000002 HC RX W HCPCS: Performed by: PHYSICIAN ASSISTANT

## 2025-01-22 PROCEDURE — 6370000000 HC RX 637 (ALT 250 FOR IP)

## 2025-01-22 PROCEDURE — 6370000000 HC RX 637 (ALT 250 FOR IP): Performed by: THORACIC SURGERY (CARDIOTHORACIC VASCULAR SURGERY)

## 2025-01-22 PROCEDURE — 6360000002 HC RX W HCPCS: Performed by: THORACIC SURGERY (CARDIOTHORACIC VASCULAR SURGERY)

## 2025-01-22 PROCEDURE — 6370000000 HC RX 637 (ALT 250 FOR IP): Performed by: NURSE PRACTITIONER

## 2025-01-22 PROCEDURE — 97530 THERAPEUTIC ACTIVITIES: CPT

## 2025-01-22 PROCEDURE — 6370000000 HC RX 637 (ALT 250 FOR IP): Performed by: PHYSICIAN ASSISTANT

## 2025-01-22 PROCEDURE — 80048 BASIC METABOLIC PNL TOTAL CA: CPT

## 2025-01-22 PROCEDURE — 85027 COMPLETE CBC AUTOMATED: CPT

## 2025-01-22 PROCEDURE — 2580000003 HC RX 258: Performed by: THORACIC SURGERY (CARDIOTHORACIC VASCULAR SURGERY)

## 2025-01-22 PROCEDURE — 94668 MNPJ CHEST WALL SBSQ: CPT

## 2025-01-22 PROCEDURE — 6360000002 HC RX W HCPCS

## 2025-01-22 PROCEDURE — 83735 ASSAY OF MAGNESIUM: CPT

## 2025-01-22 PROCEDURE — 97116 GAIT TRAINING THERAPY: CPT

## 2025-01-22 PROCEDURE — 94669 MECHANICAL CHEST WALL OSCILL: CPT

## 2025-01-22 RX ORDER — OXYCODONE HYDROCHLORIDE 5 MG/1
5 TABLET ORAL EVERY 6 HOURS PRN
Qty: 28 TABLET | Refills: 0 | Status: SHIPPED | OUTPATIENT
Start: 2025-01-22 | End: 2025-01-29

## 2025-01-22 RX ORDER — SENNA AND DOCUSATE SODIUM 50; 8.6 MG/1; MG/1
1 TABLET, FILM COATED ORAL 2 TIMES DAILY
Qty: 15 TABLET | Refills: 0 | Status: SHIPPED | OUTPATIENT
Start: 2025-01-22 | End: 2025-01-30

## 2025-01-22 RX ORDER — FUROSEMIDE 40 MG/1
40 TABLET ORAL DAILY
Qty: 7 TABLET | Refills: 0 | Status: SHIPPED | OUTPATIENT
Start: 2025-01-26 | End: 2025-02-02

## 2025-01-22 RX ORDER — METOPROLOL SUCCINATE 25 MG/1
12.5 TABLET, EXTENDED RELEASE ORAL 2 TIMES DAILY
Qty: 30 TABLET | Refills: 3 | Status: SHIPPED | OUTPATIENT
Start: 2025-01-22

## 2025-01-22 RX ORDER — TORSEMIDE 20 MG/1
20 TABLET ORAL 2 TIMES DAILY
Qty: 6 TABLET | Refills: 0 | Status: SHIPPED | OUTPATIENT
Start: 2025-01-22 | End: 2025-01-25

## 2025-01-22 RX ORDER — POTASSIUM CHLORIDE 1500 MG/1
TABLET, EXTENDED RELEASE ORAL
Qty: 26 TABLET | Refills: 0 | Status: SHIPPED | OUTPATIENT
Start: 2025-01-23 | End: 2025-02-02

## 2025-01-22 RX ORDER — LACTULOSE 10 G/15ML
20 SOLUTION ORAL
Status: DISCONTINUED | OUTPATIENT
Start: 2025-01-22 | End: 2025-01-22

## 2025-01-22 RX ORDER — TAMSULOSIN HYDROCHLORIDE 0.4 MG/1
0.4 CAPSULE ORAL DAILY
Qty: 30 CAPSULE | Refills: 0 | Status: SHIPPED | OUTPATIENT
Start: 2025-01-23

## 2025-01-22 RX ORDER — CLOPIDOGREL BISULFATE 75 MG/1
75 TABLET ORAL DAILY
Qty: 30 TABLET | Refills: 3 | Status: SHIPPED | OUTPATIENT
Start: 2025-01-23

## 2025-01-22 RX ORDER — PANTOPRAZOLE SODIUM 40 MG/1
40 TABLET, DELAYED RELEASE ORAL DAILY
Qty: 30 TABLET | Refills: 3 | Status: SHIPPED | OUTPATIENT
Start: 2025-01-23

## 2025-01-22 RX ORDER — ASPIRIN 81 MG/1
81 TABLET ORAL DAILY
Qty: 30 TABLET | Refills: 3 | Status: SHIPPED | OUTPATIENT
Start: 2025-01-23

## 2025-01-22 RX ORDER — ATORVASTATIN CALCIUM 80 MG/1
80 TABLET, FILM COATED ORAL NIGHTLY
Qty: 30 TABLET | Refills: 3 | Status: SHIPPED | OUTPATIENT
Start: 2025-01-22

## 2025-01-22 RX ORDER — BISACODYL 10 MG
20 SUPPOSITORY, RECTAL RECTAL ONCE
Status: DISCONTINUED | OUTPATIENT
Start: 2025-01-22 | End: 2025-01-22

## 2025-01-22 RX ADMIN — ACETAMINOPHEN 1000 MG: 500 TABLET ORAL at 03:56

## 2025-01-22 RX ADMIN — ACETYLCYSTEINE 600 MG: 200 SOLUTION ORAL; RESPIRATORY (INHALATION) at 05:21

## 2025-01-22 RX ADMIN — BUMETANIDE 1 MG: 0.25 INJECTION INTRAMUSCULAR; INTRAVENOUS at 04:00

## 2025-01-22 RX ADMIN — TAMSULOSIN HYDROCHLORIDE 0.4 MG: 0.4 CAPSULE ORAL at 09:16

## 2025-01-22 RX ADMIN — PANTOPRAZOLE SODIUM 40 MG: 40 TABLET, DELAYED RELEASE ORAL at 09:17

## 2025-01-22 RX ADMIN — ALBUTEROL SULFATE 2.5 MG: 2.5 SOLUTION RESPIRATORY (INHALATION) at 08:48

## 2025-01-22 RX ADMIN — Medication 5 ML: at 01:41

## 2025-01-22 RX ADMIN — ALBUTEROL SULFATE 2.5 MG: 2.5 SOLUTION RESPIRATORY (INHALATION) at 13:00

## 2025-01-22 RX ADMIN — BUMETANIDE 1 MG: 0.25 INJECTION INTRAMUSCULAR; INTRAVENOUS at 09:18

## 2025-01-22 RX ADMIN — INSULIN GLARGINE 45 UNITS: 100 INJECTION, SOLUTION SUBCUTANEOUS at 09:26

## 2025-01-22 RX ADMIN — ASPIRIN 81 MG: 81 TABLET, COATED ORAL at 09:17

## 2025-01-22 RX ADMIN — SODIUM CHLORIDE 125 MG: 9 INJECTION, SOLUTION INTRAVENOUS at 12:35

## 2025-01-22 RX ADMIN — ACETYLCYSTEINE 600 MG: 200 SOLUTION ORAL; RESPIRATORY (INHALATION) at 08:48

## 2025-01-22 RX ADMIN — AMOXICILLIN AND CLAVULANATE POTASSIUM 1 TABLET: 875; 125 TABLET, FILM COATED ORAL at 09:17

## 2025-01-22 RX ADMIN — GUAIFENESIN 600 MG: 600 TABLET, MULTILAYER, EXTENDED RELEASE ORAL at 09:19

## 2025-01-22 RX ADMIN — HEPARIN SODIUM 7500 UNITS: 5000 INJECTION INTRAVENOUS; SUBCUTANEOUS at 05:28

## 2025-01-22 RX ADMIN — METOPROLOL SUCCINATE 12.5 MG: 25 TABLET, FILM COATED, EXTENDED RELEASE ORAL at 09:17

## 2025-01-22 RX ADMIN — CLOPIDOGREL BISULFATE 75 MG: 75 TABLET ORAL at 09:19

## 2025-01-22 RX ADMIN — AMIODARONE HYDROCHLORIDE 400 MG: 200 TABLET ORAL at 09:17

## 2025-01-22 RX ADMIN — ALBUTEROL SULFATE 2.5 MG: 2.5 SOLUTION RESPIRATORY (INHALATION) at 05:20

## 2025-01-22 RX ADMIN — POTASSIUM CHLORIDE 20 MEQ: 1500 TABLET, EXTENDED RELEASE ORAL at 05:27

## 2025-01-22 RX ADMIN — POTASSIUM CHLORIDE 40 MEQ: 1500 TABLET, EXTENDED RELEASE ORAL at 03:57

## 2025-01-22 RX ADMIN — POTASSIUM CHLORIDE 40 MEQ: 1500 TABLET, EXTENDED RELEASE ORAL at 09:18

## 2025-01-22 RX ADMIN — Medication 10 ML: at 09:25

## 2025-01-22 ASSESSMENT — PAIN SCALES - GENERAL: PAINLEVEL_OUTOF10: 3

## 2025-01-22 NOTE — DISCHARGE INSTR - COC
Incision Assessment Dry 25 0800   Drainage Amount None (dry) 25 0800   Drainage Description Serosanguinous 25   Odor None 25 08   Macarena-incision Assessment Intact 25 08   Number of days: 5        Elimination:  Continence:   Bowel: {YES / NO:}  Bladder: {YES / NO:}  Urinary Catheter: {Urinary Catheter:753796382}   Colostomy/Ileostomy/Ileal Conduit: {YES / NO:}       Date of Last BM: ***    Intake/Output Summary (Last 24 hours) at 2025 1217  Last data filed at 2025 0900  Gross per 24 hour   Intake 1280 ml   Output 4930 ml   Net -3650 ml     I/O last 3 completed shifts:  In:  [P.O.:]  Out: 6570 [Urine:6570]    Safety Concerns:     { LUCIAN Safety Concerns:362422243}    Impairments/Disabilities:      { LUCIAN Impairments/Disabilities:182360934}    Nutrition Therapy:  Current Nutrition Therapy:   { LUCIAN Diet List:918505520}    Routes of Feeding: {[facility] Other Feedings:827062246}  Liquids: {Slp liquid thickness:63392}  Daily Fluid Restriction: {[facility] DME Yes amt example:151764201}  Last Modified Barium Swallow with Video (Video Swallowing Test): {Done Not Done Date:}    Treatments at the Time of Hospital Discharge:   Respiratory Treatments: ***  Oxygen Therapy:  {Therapy; copd oxygen:38302}  Ventilator:    { CC Vent List:103275546}    Rehab Therapies: {THERAPEUTIC INTERVENTION:3458275187}  Weight Bearing Status/Restrictions: {[facility] Weight Bearin}  Other Medical Equipment (for information only, NOT a DME order):  {EQUIPMENT:724572104}  Other Treatments: ***    Patient's personal belongings (please select all that are sent with patient):  {[facility] DME Belongings:704427504}    RN SIGNATURE:  {Esignature:701595139}    CASE MANAGEMENT/SOCIAL WORK SECTION    Inpatient Status Date: 2025    Readmission Risk Assessment Score:  [facility] RISK OF UNPLANNED READMISSION 2.0             15 Total Score        Discharging to Facility/ Agency

## 2025-01-22 NOTE — PLAN OF CARE
Problem: Discharge Planning  Goal: Discharge to home or other facility with appropriate resources  1/22/2025 1321 by Vandana Pacheco RN  Outcome: Adequate for Discharge     Problem: Safety - Adult  Goal: Free from fall injury  1/22/2025 1321 by Vandana Pacheco RN  Outcome: Adequate for Discharge     Problem: Pain  Goal: Verbalizes/displays adequate comfort level or baseline comfort level  1/22/2025 1321 by Vandana Pacheco RN  Outcome: Adequate for Discharge     Problem: ABCDS Injury Assessment  Goal: Absence of physical injury  1/22/2025 1321 by Vandana Pacheco RN  Outcome: Adequate for Discharge     Problem: Nutrition Deficit:  Goal: Optimize nutritional status  1/22/2025 1321 by Vandana Pacheco RN  Outcome: Adequate for Discharge  Flowsheets (Taken 1/22/2025 0951 by Susan Cee, MS, RD, LD)  Nutrient intake appropriate for improving, restoring, or maintaining nutritional needs:   Recommend appropriate diets, oral nutritional supplements, and vitamin/mineral supplements   Monitor oral intake, labs, and treatment plans     Problem: Neurosensory - Adult  Goal: Achieves maximal functionality and self care  1/22/2025 1321 by Vandana Pacheco RN  Outcome: Adequate for Discharge     Problem: Respiratory - Adult  Goal: Achieves optimal ventilation and oxygenation  1/22/2025 1321 by Vandana Pacheco RN  Outcome: Adequate for Discharge     Problem: Cardiovascular - Adult  Goal: Maintains optimal cardiac output and hemodynamic stability  1/22/2025 1321 by Vandana Pacheco RN  Outcome: Adequate for Discharge     Problem: Cardiovascular - Adult  Goal: Absence of cardiac dysrhythmias or at baseline  1/22/2025 1321 by Vandana Pacheco RN  Outcome: Adequate for Discharge     Problem: Skin/Tissue Integrity - Adult  Goal: Skin integrity remains intact  1/22/2025 1321 by Vandana Pacheco RN  Outcome: Adequate for Discharge     Problem: Skin/Tissue Integrity - Adult  Goal: Incisions, wounds, or drain sites healing without S/S of infection  1/22/2025 1321 by Junior

## 2025-01-22 NOTE — DISCHARGE SUMMARY
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 Cooley    History:  The patient is a 59 y.o. male    Hospital Course:  The patient is a 59 y.o. male with significant past medical history of HTN, HLD, tobacco use, alcohol use (6-7 beers every Saturday), ascending AAA, fatty liver, obesity and pancreatitis who presents with chest pain and some dizziness and shortness of breath with it. Patient also had a known history of an ascending thoracic aneurysm around 5.1 cm that he was supposed to get scanned for later this week. Troponins were elevated at 89 yesterday and 48 the day before.  Patient and wife believe he might have undiagnosed sleep apnea. Patient is currently hemodynamically stable and denies any chest pain at this time. The patient was discharged on 1/22/25 to home with home health care.    Hospital Course:  1/17: pt seen and examined today laying in bed on NC @4L's; currently on lidocaine

## 2025-01-22 NOTE — CARE COORDINATION
Case Management -  Discharge Note      Patient Name: Kojo Gracia                   YOB: 1965  Room: [address]            Readmission Risk (Low < 19, Mod (19-27), High > 27): Readmission Risk Score: 15    Current PCP: Lucho Traylor MD      PT AM-PAC: 14 /24  OT AM-PAC: 17 /24    Home Care Information:   Is patient resuming current home health care services: No    Home Care Agency:   [facility]  [facility] [address],   [place] [address]  Phone: [phone]  Fax: [phone]              Services: PT/OT/Nursing  Home Health Order Obtained: PT/OT/Nursing    Home health agency notified of discharge.       Financial    Payor: [facility] / Plan: [facility] / Product Type: *No Product type* /     Pharmacy:  Potential assistance Purchasing Medications: No  Meds-to-Beds request: Yes      [facility] [address] [phone] -  [phone]  [address] [place] [address]  Phone: [phone] Fax: [phone]    [id number] [place] [address] -  [phone] - F [phone]  [address]  [facility] [address]  Phone: [phone] Fax: [phone]      Notes:    Additional Case Management Notes: Wife to transport home. All CM needs met.         Electronically signed by RANI Pratt on 1/22/2025 at 12:21 PM           
Discharge Planning Note:  Chart reviewed and it appears that patient has minimal needs for discharge at this time.     Risk Score 6 %     Primary Care Physician is Lucho Traylor MD    Primary insurance is R    Please notify case management if any discharge needs are identified.      Case management will continue to follow progress and update discharge plan as needed.   
  Potential Assistance needed at discharge: N/A            Potential DME:    Patient expects to discharge to: House  Plan for transportation at discharge: Self    Financial    Payor: [employer] / Plan: [facility] / Product Type: *No Product type* /     Does insurance require precert for SNF: Yes    Potential assistance Purchasing Medications: No  Meds-to-Beds request:        [facility] - [place], [place] - [address] [facility] [phone] - F [phone]  [address] [facility] [address]  Phone: [phone] Fax: [phone]    [id number] - [place], [place] - [address]  - P [phone] - F [phone]  [address] [facility] [address]  Phone: [phone] Fax: [phone]      Notes:    Factors facilitating achievement of predicted outcomes: Family support, Motivated, Cooperative, and Pleasant    Barriers to discharge:     Additional Case Management Notes: YOSSI spoke with the patient and family. Patient is from home with his wife. Prior to admission the patient was independent. Patient has a CABG this admission and will need HHC at discharge. YOSSI spoke with the patient and wife about HHC and both are agreeable. Both stated they had no preference of HHC. YOSSI sent a referral to Chrissie with [facility]. Chrissie stated she could accept and will follow the patient.    The Plan for Transition of Care is related to the following treatment goals of Atrial fibrillation (HCC) [I48.91]  Atrial fibrillation with rapid ventricular response (HCC) [I48.91]  Chest pain, unspecified type [R07.9]  Aneurysm of ascending aorta without rupture (HCC) [I71.21]    IF APPLICABLE: The Patient and/or patient representative Kojo and his family were provided with a choice of provider and agrees with the discharge plan. Freedom of choice list with basic dialogue that supports the patient's individualized plan of care/goals and shares the quality data associated with the providers was

## 2025-01-23 NOTE — PROGRESS NOTES
CVTS Cardiothoracic Progress Note:    Surgery: 1/16/25  CORONARY ARTERY BYPASS GRAFTING X 4, AORTIC VALVE REPLACEMENT WITH 25MM EPIC MAX AORTIC VALVE, ASENDING AORTIC ANEURYSM REPLACEMENT WITH 34MM X 10MM HEMASHIELD PLATINUM GRAFT, LEFT ATRIAL APPENDAGE WITH 40 MM ATRICLIP, TOTAL CARDIOPULMONARY BYPASS, PAOLO, MEDISTIM FLOW VERIFICATION OF BYPASS GRAFTS, ENDOSCOPIC VEIN HARVEST OF RIGHT SAPHENOUS VEIN, INSERTION OF TEMPORARY ATRIAL AND VENTRICULAR PACING WIRES, MODIFIED MAZE,   Surgeon: Dr. Cooley  POD #: 1    Subjective:   1/16: pt seen and examined today laying in bed on NC @4L's; currently on lidocaine 0.5 mg/ min, and vaso 0.03 units/ min with recent levophed off. Most recent hemodynamics CVP 15, CO 6.3, CI 2.7 and SV 74; Pt remains AV paced at 80; labs reviewed WBC trending down 14.5 from 20.6 platelets 129 from 181. Plan to continue ASA today.     Vital Signs: BP (!) 106/56   Pulse 80   Temp 98.2 °F (36.8 °C) (Bladder)   Resp 22   Ht 1.753 m (5' 9\")   Wt 126 kg (277 lb 12.5 oz)   SpO2 97%   BMI 41.02 kg/m²  O2 Flow Rate (L/min): 4 L/min     Admission Weight: Weight - Scale: 124.7 kg (275 lb)    1/16: 122.1 kg Pre-op   1/17:126  kg    Intake/Output:   Intake/Output Summary (Last 24 hours) at 1/17/2025 1040  Last data filed at 1/17/2025 1000  Gross per 24 hour   Intake 6860.78 ml   Output 5042 ml   Net 1818.78 ml        Chest tubes/Drains: (pt was transitioned to CVU late last night;  measures overnight)  #1 Left pleural  420cc/overnight  #2 mediastinal  25cc/ overnight   #3 right pleural  370cc/ overnight   no airleaks noted in either tube    Vent Settings:   Extubation time / date: 1/17/25 @ 0105  RECOVERY PERIOD ENDS 0105     LABORATORY DATA:    CBC:   Recent Labs     01/16/25  1918 01/16/25  2032 01/16/25  2037 01/16/25  2352 01/17/25  0420   WBC 11.1*  --  20.6*  --  14.5*   HGB 11.0*   < > 14.0 13.2* 11.9*   HCT 32.3*  --  41.3  --  35.2*   MCV 93.0  --  
                                  CVTS Cardiothoracic Progress Note:    Surgery: 1/16/25  CORONARY ARTERY BYPASS GRAFTING X 4, AORTIC VALVE REPLACEMENT WITH 25MM EPIC MAX AORTIC VALVE, ASENDING AORTIC ANEURYSM REPLACEMENT WITH 34MM X 10MM HEMASHIELD PLATINUM GRAFT, LEFT ATRIAL APPENDAGE WITH 40 MM ATRICLIP, TOTAL CARDIOPULMONARY BYPASS, PAOLO, MEDISTIM FLOW VERIFICATION OF BYPASS GRAFTS, ENDOSCOPIC VEIN HARVEST OF RIGHT SAPHENOUS VEIN, INSERTION OF TEMPORARY ATRIAL AND VENTRICULAR PACING WIRES, MODIFIED MAZE,   Surgeon: Dr. Cooley  POD #: 4    Subjective:   1/17: pt seen and examined today laying in bed on NC @4L's; currently on lidocaine 0.5 mg/ min, and vaso 0.03 units/ min with recent levophed off. Most recent hemodynamics CVP 15, CO 6.3, CI 2.7 and SV 74; Pt remains AV paced at 80; labs reviewed WBC trending down 14.5 from 20.6 platelets 129 from 181. Plan to continue ASA today.     1/18 POD 2 bioAVR, tubular ascending aortic replacement, CABG x 4, modified Maze  Doing OK, but has a serious nagging cough that seem like he has a mucous ball in his trachea that he cannot expectorate. With thism he has not slept and is in a mild degree of respiratory distress.     1/19 POD 3 bioAVR, tubular ascending aortic replacement, CABG x 4, modified Maze  Much better today - incessant cough treated with diuresis, Zosyn, Phenergan and chest tube removal.  He finally got a good night of sleep too.     1/20 Sitting up in chair. Needs continuous reminders to adhere to sternal precautions. Remains in SR, 60's    Vital Signs: /61   Pulse 62   Temp 97.5 °F (36.4 °C) (Temporal)   Resp 17   Ht 1.753 m (5' 9\")   Wt 126.8 kg (279 lb 8.7 oz)   SpO2 98%   BMI 41.28 kg/m²  O2 Flow Rate (L/min): 1 L/min     Admission Weight: Weight - Scale: 124.7 kg (275 lb)    1/16: 122.1 kg Pre-op   1/17:126  kg  1/18 130.8 kg  1/19 129.9 kg  1/20 126.8 kg, +4.7 kg    Intake/Output:   Intake/Output Summary (Last 24 hours) at 1/20/2025 
                                  CVTS Cardiothoracic Progress Note:    Surgery: 1/16/25  CORONARY ARTERY BYPASS GRAFTING X 4, AORTIC VALVE REPLACEMENT WITH 25MM EPIC MAX AORTIC VALVE, ASENDING AORTIC ANEURYSM REPLACEMENT WITH 34MM X 10MM HEMASHIELD PLATINUM GRAFT, LEFT ATRIAL APPENDAGE WITH 40 MM ATRICLIP, TOTAL CARDIOPULMONARY BYPASS, PAOLO, MEDISTIM FLOW VERIFICATION OF BYPASS GRAFTS, ENDOSCOPIC VEIN HARVEST OF RIGHT SAPHENOUS VEIN, INSERTION OF TEMPORARY ATRIAL AND VENTRICULAR PACING WIRES, MODIFIED MAZE,   Surgeon: Dr. Cooley  POD #: 5    Subjective:   1/17: pt seen and examined today laying in bed on NC @4L's; currently on lidocaine 0.5 mg/ min, and vaso 0.03 units/ min with recent levophed off. Most recent hemodynamics CVP 15, CO 6.3, CI 2.7 and SV 74; Pt remains AV paced at 80; labs reviewed WBC trending down 14.5 from 20.6 platelets 129 from 181. Plan to continue ASA today.     1/18 POD 2 bioAVR, tubular ascending aortic replacement, CABG x 4, modified Maze  Doing OK, but has a serious nagging cough that seem like he has a mucous ball in his trachea that he cannot expectorate. With thism he has not slept and is in a mild degree of respiratory distress.     1/19 POD 3 bioAVR, tubular ascending aortic replacement, CABG x 4, modified Maze  Much better today - incessant cough treated with diuresis, Zosyn, Phenergan and chest tube removal.  He finally got a good night of sleep too.     1/20 Sitting up in chair. Needs continuous reminders to adhere to sternal precautions. Remains in SR, 60's    1/21:Sitting up in chair. Reports sob with exertion only. No C/p. No cough.        Vital Signs: /62   Pulse 63   Temp 98.6 °F (37 °C) (Temporal)   Resp 20   Ht 1.753 m (5' 9\")   Wt 129 kg (284 lb 6.3 oz)   SpO2 91%   BMI 42.00 kg/m²  O2 Flow Rate (L/min): 1 L/min     Admission Weight: Weight - Scale: 124.7 kg (275 lb)    1/16: 122.1 kg Pre-op   1/17:126  kg  1/18 130.8 kg  1/19 129.9 kg  1/20 126.8 kg, +4.7 
        [facility]    1/17/2025 7:16 AM        Name: Kojo Gracia .              Admitted: 1/13/2025  Primary Care Provider: Lucho Traylor MD (Tel: [phone])      Subjective:      Seen this am with RN at bedside  Reports expected post operative pain with coughing. Currently on 4 liters   No family current at bedside   Insulin drip stopped today   Was given 30 units lantus this am.   Also received 1000 mg Solu medrol pre operatively     POD # 1 from:  ( OR date 1/16/2025)      CORONARY ARTERY BYPASS GRAFTING X 4, AORTIC VALVE REPLACEMENT WITH 25MM EPIC MAX AORTIC VALVE, ASENDING AORTIC ANEURYSM REPLACEMENT WITH 34MM X 10MM HEMASHIELD PLATINUM GRAFT, LEFT ATRIAL APPENDAGE WITH 40 MM ATRICLIP, TOTAL CARDIOPULMONARY BYPASS, PAOLO, MEDISTIM FLOW VERIFICATION OF BYPASS GRAFTS, ENDOSCOPIC VEIN HARVEST OF RIGHT SAPHENOUS VEIN, INSERTION OF TEMPORARY ATRIAL AND VENTRICULAR PACING WIRES, MODIFIED MAZE,       T max 101.4      Summary:   Pt sought care in the ED due to AF with RVR and chest pain. Converted with Cardizem.  C with MVD    Reviewed interval ancillary notes    Current Medications  lactated ringers infusion, Continuous  sodium chloride flush 0.9 % injection 5-40 mL, 2 times per day  sodium chloride flush 0.9 % injection 5-40 mL, PRN  0.9 % sodium chloride infusion, PRN  ondansetron (ZOFRAN-ODT) disintegrating tablet 4 mg, Q8H PRN   Or  ondansetron (ZOFRAN) injection 4 mg, Q6H PRN  aspirin EC tablet 81 mg, Daily  mupirocin (BACTROBAN) 2 % ointment, BID  diphenhydrAMINE (BENADRYL) tablet 12.5 mg, Nightly PRN  polyethylene glycol (GLYCOLAX) packet 17 g, Daily  sennosides-docusate sodium (SENOKOT-S) 8.6-50 MG tablet 1 tablet, BID  bisacodyl (DULCOLAX) suppository 10 mg, Q6H PRN  atorvastatin (LIPITOR) tablet 80 mg, Nightly  pantoprazole (PROTONIX) tablet 40 mg, Daily   Or  pantoprazole (PROTONIX) 40 mg in sodium chloride (PF) 0.9 % 10 mL injection, Daily  ceFAZolin (ANCEF) 
        [facility]    1/16/2025 7:40 AM        Name: Kojo Gracia .              Admitted: 1/13/2025  Primary Care Provider: Lucho Traylor MD (Tel: [phone])      Subjective:  .      For CABG today.    Seen this am with family at bedside  No current pain  Reports he slept well for about 4 hours.      Newly diagnosed T2DM with AIC 6.6       Reviewed interval ancillary notes    Current Medications  sodium chloride flush 0.9 % injection 5-40 mL, 2 times per day  sodium chloride flush 0.9 % injection 5-40 mL, PRN  0.9 % sodium chloride infusion, PRN  mupirocin (BACTROBAN) 2 % ointment, BID  chlorhexidine gluconate (ANTISEPTIC SKIN CLEANSER) 4 % solution, See Admin Instructions  aminocaproic acid (AMICAR) 10,000 mg in sodium chloride 0.9 % 500 mL infusion, Continuous  insulin regular (MYXREDLIN) 100 units in sodium chloride 0.9 % 100 ml infusion, Continuous PRN  dilTIAZem HCl-Sodium Chloride 125 mg / 125 mL infusion, Continuous  heparin (porcine) injection 4,000 Units, PRN  heparin (porcine) injection 2,000 Units, PRN  heparin 25,000 units in dextrose 5% 250 mL (premix) infusion, Continuous  sodium chloride flush 0.9 % injection 5-40 mL, 2 times per day  sodium chloride flush 0.9 % injection 5-40 mL, PRN  0.9 % sodium chloride infusion, PRN  potassium chloride (KLOR-CON M) extended release tablet 40 mEq, PRN   Or  potassium bicarb-citric acid (EFFER-K) effervescent tablet 40 mEq, PRN   Or  potassium chloride 10 mEq/100 mL IVPB (Peripheral Line), PRN  magnesium sulfate 2000 mg in 50 mL IVPB premix, PRN  ondansetron (ZOFRAN-ODT) disintegrating tablet 4 mg, Q8H PRN   Or  ondansetron (ZOFRAN) injection 4 mg, Q6H PRN  polyethylene glycol (GLYCOLAX) packet 17 g, Daily PRN  acetaminophen (TYLENOL) tablet 650 mg, Q6H PRN   Or  acetaminophen (TYLENOL) suppository 650 mg, Q6H PRN  atorvastatin (LIPITOR) tablet 40 mg, Nightly  aspirin chewable tablet 81 mg, Daily  pantoprazole (PROTONIX) 
        [facility]    1/14/2025 8:06 AM        Name: Kojo Gracia .              Admitted: 1/13/2025  Primary Care Provider: Lucho Traylor MD (Tel: [phone])      Subjective:  .    Pt sought care in the ED due to AF with RVR and chest pain.  Noted to have elevated troponin .  Cardiology following.    Seen this am with wife at bedside. Pt is currently pain free.  AF persists despite IV cardizem.  Rate up to 100 with walking to the bathroom.     Tsh in range ,  drinks 6 -7 beers every Saturday     Reviewed interval ancillary notes    Current Medications  dilTIAZem HCl-Sodium Chloride 125 mg / 125 mL infusion, Continuous  heparin (porcine) injection 4,000 Units, PRN  heparin (porcine) injection 2,000 Units, PRN  heparin 25,000 units in dextrose 5% 250 mL (premix) infusion, Continuous  sodium chloride flush 0.9 % injection 5-40 mL, 2 times per day  sodium chloride flush 0.9 % injection 5-40 mL, PRN  0.9 % sodium chloride infusion, PRN  potassium chloride (KLOR-CON M) extended release tablet 40 mEq, PRN   Or  potassium bicarb-citric acid (EFFER-K) effervescent tablet 40 mEq, PRN   Or  potassium chloride 10 mEq/100 mL IVPB (Peripheral Line), PRN  magnesium sulfate 2000 mg in 50 mL IVPB premix, PRN  ondansetron (ZOFRAN-ODT) disintegrating tablet 4 mg, Q8H PRN   Or  ondansetron (ZOFRAN) injection 4 mg, Q6H PRN  polyethylene glycol (GLYCOLAX) packet 17 g, Daily PRN  acetaminophen (TYLENOL) tablet 650 mg, Q6H PRN   Or  acetaminophen (TYLENOL) suppository 650 mg, Q6H PRN  atorvastatin (LIPITOR) tablet 40 mg, Nightly  aspirin chewable tablet 81 mg, Daily  pantoprazole (PROTONIX) tablet 40 mg, QAM AC  digoxin (LANOXIN) tablet 250 mcg, Daily  sulfur hexafluoride microspheres (LUMASON) 60.7-25 MG injection 2 mL, ONCE PRN  HYDROmorphone HCl PF (DILAUDID) injection 0.5 mg, Q4H PRN        Objective:  /82   Pulse 92   Temp 98 °F (36.7 °C) (Oral)   Resp 16   Ht 1.753 m (5' 9\")   
   01/16/25 2032   Oxygen Therapy/Pulse Ox   O2 Therapy Oxygen   $Oxygen $Daily Charge   O2 Device Ventilator   FiO2  80 %   Pulse 96   Respirations 18   SpO2 96 %   Pulse Oximeter Device Mode Continuous   Pulse Oximeter Device Location Finger   $Pulse Oximeter $Spot check (multiple/continuous)       
   01/16/25 2032   Patient Observation   Pulse 96   Respirations 18   SpO2 96 %   Vent Information   Ventilator Day(s) 1   Ventilator Safety Check Performed Pre-Use Yes   Ventilator Initiate Yes   Vent Mode SIMV/PRVC   $Ventilation $Initial Day   Ventilator Settings   FiO2  80 %   Pressure Support (cm H2O) 10 cm H2O   Insp Time (sec) 0.84 sec   Resp Rate (Set) 16 bpm   Target Vt 600   PEEP/CPAP (cmH2O) 5       
   01/16/25 2050   Patient Observation   Pulse 80   Respirations 22   SpO2 96 %   Breath Sounds   Respiratory Pattern Regular   Breath Sounds Bilateral Diminished   Vent Information   Ventilator Day(s) 1   Ventilator ID [id number]   Vent Mode SIMV/PRVC   Ventilator Settings   FiO2  80 %   Pressure Support (cm H2O) 10 cm H2O   Insp Time (sec) 0 sec   Resp Rate (Set) 22 bpm   Target Vt 600   PEEP/CPAP (cmH2O) 5   Vt (Set, mL) 600 mL   Vent Patient Data (Readings)   Vt (Measured) 590 mL   Peak Inspiratory Pressure (cmH2O) 28 cmH2O   Rate Measured 22 br/min   Minute Volume (L/min) 12.9 Liters   Mean Airway Pressure (cmH2O) 13 cmH20   Plateau Pressure (cm H2O) 0 cm H2O   Driving Pressure -5   I:E Ratio 1:2.30   PEEP Intrinsic (cm H2O) 5.7 cm H2O   Static Compliance (L/cm H2O) 0   Dynamic Compliance (L/cm H2O) 52 L/cm H2O   Backup Apnea On   Backup Rate 22 Breaths Per Minute   Backup Vt 600   Backup I Time 1   Vent Alarm Settings   High Pressure (cmH2O) 45 cmH2O   Low Minute Volume (lpm) 2 L/min   High Minute Volume (lpm) 20 L/min   Low Exhaled Vt (ml) 200 mL   High Exhaled Vt (ml) 1000 mL   RR High (bpm) 40 br/min   Additional Respiratoray Assessments   Humidification Source Heated wire   Humidification Temp 32.8   Ambu Bag With Mask At Bedside Yes   Airway Clearance   Suction ET Tube   Suction Device Inline suction catheter   Sputum Method Obtained Endotracheal   Sputum Amount Scant   Sputum Color/Odor Clear   Sputum Consistency Thin   ETT    Placement Date/Time: 01/16/25 1200   Placed By: In surgery  Placement Verified By: Auscultation;Capnometry  Preoxygenation: Yes  Mask Ventilation: Mask ventilation not attempted (0)  Technique: Direct laryngoscopy;Rapid sequence;Cricoid pressure  Airw...   Secured At 22 cm   Measured From Lips   ETT Placement Center   Secured By Commercial tube bruce   Site Assessment Dry   Cuff Pressure 30 cm H2O   Ventilator Associated Pneumonia Bundle   Elevation of Head of Bed to 30-45 Degrees 
   01/17/25 0040   Patient Observation   Pulse 80   Respirations 23   SpO2 100 %   Breath Sounds   Respiratory Pattern Regular   Breath Sounds Bilateral Diminished   Vent Information   Ventilator Day(s) 2   Ventilator ID [id number]   Vent Mode (S)  CPAP/PS   Ventilator Settings   FiO2  40 %   Pressure Support (cm H2O) 10 cm H2O   PEEP/CPAP (cmH2O) 5   Vent Patient Data (Readings)   Vt (Measured) 507 mL   Peak Inspiratory Pressure (cmH2O) 18 cmH2O   Rate Measured 23 br/min   Minute Volume (L/min) 10.5 Liters   Mean Airway Pressure (cmH2O) 9.1 cmH20   Plateau Pressure (cm H2O) 0 cm H2O   Driving Pressure -5   I:E Ratio 1:2.30   PEEP Intrinsic (cm H2O) 4.8 cm H2O   Static Compliance (L/cm H2O) 0   Dynamic Compliance (L/cm H2O) 43 L/cm H2O   Backup Apnea On   Backup Rate 22 Breaths Per Minute   Backup Vt 600   Vent Alarm Settings   High Pressure (cmH2O) 45 cmH2O   Low Minute Volume (lpm) 2 L/min   High Minute Volume (lpm) 20 L/min   Low Exhaled Vt (ml) 200 mL   High Exhaled Vt (ml) 1000 mL   RR High (bpm) 40 br/min   Additional Respiratoray Assessments   Humidification Source Heated wire   Humidification Temp 36.8   Circuit Condensation Drained   Ambu Bag With Mask At Bedside Yes   Airway Clearance   Suction ET Tube   Suction Device Inline suction catheter   Sputum Method Obtained Endotracheal   Sputum Amount Scant   Sputum Color/Odor Clear   Sputum Consistency Thin   ETT    Placement Date/Time: 01/16/25 1200   Placed By: In surgery  Placement Verified By: Auscultation;Capnometry  Preoxygenation: Yes  Mask Ventilation: Mask ventilation not attempted (0)  Technique: Direct laryngoscopy;Rapid sequence;Cricoid pressure  Airw...   Secured At 26 cm   Measured From Lips   ETT Placement Left   Secured By Commercial tube bruce   Site Assessment Dry   Cuff Pressure 30 cm H2O   Ventilator Associated Pneumonia Bundle   Elevation of Head of Bed to 30-45 Degrees  Yes   ETT/Trach Suctioning Yes       
   01/17/25 0512   RT Protocol   History Pulmonary Disease 0   Respiratory pattern 0   Breath sounds 2   Cough 0   Bronchodilator Assessment Score 2       
   01/21/25 7219   Treatment   Treatment Type Manual P&PD       
   01/21/25 9021   Treatment   Treatment Type Vibratory mucous clearing therapy or intervention       
   01/22/25 0523   Treatment   Treatment Type Manual P&PD       
   01/22/25 0533   Treatment   Treatment Type Vibratory mucous clearing therapy or intervention       
  [facility] - Inpatient Rehabilitation Department   Phone: [phone]    Physical Therapy    [x] Initial Evaluation            [] Daily Treatment Note         [] Discharge Summary      Patient: Kojo Gracia   : 1965   MRN: [id number]   Date of Service:  2025  Admitting Diagnosis: Atrial fibrillation with rapid ventricular response (HCC)  Current Admission Summary: Kojo Gracia is a 59 y.o. male  who presents with Atrial fibrillation. He is s/p CABG x 4 and aortic valve replacement on 24.   Past Medical History:  has a past medical history of Fatty liver, Hyperlipidemia, Hypertension, and Pancreatitis.  Past Surgical History:  has a past surgical history that includes knee surgery (Right, 2021); Steroid injection knee (Right); Cardiac procedure (N/A, 2025); and Coronary artery bypass graft (N/A, 2025).  Discharge Recommendations: Kojo Gracia scored a  on the AM-PAC short mobility form. Current research shows that an AM-PAC score of 18 or greater is typically associated with a discharge to the patient's home setting. Despite low AMPAC score, expect patient will improve functionally with improvement in medical status. Based on the patient's AM-PAC score and their current functional mobility deficits, it is recommended that the patient have 2-3 sessions per week of Physical Therapy at d/c to increase the patient's independence.  At this time, this patient demonstrates the endurance and safety to discharge home with HHPT and a follow up treatment frequency of 2-3x/wk.  Please see assessment section for further patient specific details.    HOME HEALTH CARE: LEVEL 1 STANDARD    - Initial home health evaluation to occur within 24-48 hours, in patient home   - Therapy to evaluate with goal of regaining prior level of functioning   - Therapy to evaluate if patient has Home Health Aide needs for personal care   If patient discharges prior to next session this note 
  [facility] - Inpatient Rehabilitation Department   Phone: [phone]    Occupational Therapy    [x] Initial Evaluation            [] Daily Treatment Note         [] Discharge Summary      Patient: Kojo Gracia   : 1965   MRN: [id number]   Date of Service:  2025    Admitting Diagnosis:  Atrial fibrillation with rapid ventricular response (HCC)  Current Admission Summary: Per ED note, \"Kojo Gracia is a 59 y.o. male who presents to the emergency department with chest pain.  This is a 59-year-old male who presents with chest pain he describes as chest burning.  He describes some dizziness and shortness of breath with it.  It started about 30 minutes prior to arrival.  Incidentally, the patient also has a history of a ascending thoracic aneurysm around 5.1 cm.  The patient was due to get a CT later on this week for this. \"  Past Medical History:  has a past medical history of Fatty liver, Hyperlipidemia, Hypertension, and Pancreatitis.  Past Surgical History:  has a past surgical history that includes knee surgery (Right, 2021); Steroid injection knee (Right); Cardiac procedure (N/A, 2025); and Coronary artery bypass graft (N/A, 2025).    Discharge Recommendations: Kojo Gracia scored a 13/24 on the AM-PAC ADL Inpatient form. Current research shows that an AM-PAC score of 18 or greater is typically associated with a discharge to the patient's home setting. Based on the patient's AM-PAC score, and their current ADL deficits, it is recommended that the patient have 2-3 sessions per week of Occupational Therapy at d/c to increase the patient's independence.  At this time, this patient demonstrates the endurance and safety to discharge home with home services) and a follow up treatment frequency of 2-3x/wk.   Please see assessment section for further patient specific details.    HOME HEALTH CARE: LEVEL 1 STANDARD    - Initial home health evaluation to occur within 24-48 
  Physician Progress Note      PATIENT:               DAHLIA JALLOH  [facility] #:                  [id number]  :                       1965  ADMIT DATE:       2025 5:39 PM  DISCH DATE:        2025 5:30 PM  RESPONDING  PROVIDER #:        Trinidad Scanlon          QUERY TEXT:    Pt admitted with Afib RVR, s/p bioAVR, tubular ascending aortic replacement,   CABG x 4, modified Maze on . Pt noted to have \"early PNA\" and   \"micro-aspiration\" by CTS. If possible, please document in the progress   notesif you are evaluating and/or treating any of the following:    The medical record reflects the following:  Risk Factors: 59 year old male with recent surgical procedure  Clinical Indicators:  CTS : \"Concern for micro-aspiration. WBC 16 but Afebrile. On Augmentin for   another 7 days, 10 days total of abx.\"  CTS : \"CXR - improved infiltrates. Continue Zosyn. Much better today -   incessant cough treated with diuresis, Zosyn, Phenergan and chest tube   removal.\"  CTS : \"CXR shows bilateral small infiltrates - possibly some aspiration vs   CHF/early PNA. Bumex drip to remove pulmonary edema as etiology of   cough/infiltrates. Zosyn IV for infiltrates and increasing WBC. has a serious   nagging cough.  mild degree of respiratory distress. \"    Treatment: IV Zosyn - to PO Augmentin. CXR x5,  Options provided:  -- Treated for suspected aspiration pneumonia  -- Other - I will add my own diagnosis  -- Disagree - Not applicable / Not valid  -- Disagree - Clinically unable to determine / Unknown  -- Refer to Clinical Documentation Reviewer    PROVIDER RESPONSE TEXT:    Provider disagreed with this query.  Patient remained afebrile and chest xray improved after diuretics    Query created by: Tita Bustos on 2025 8:11 PM      Electronically signed by:  Trinidad Scanlon 2025 7:41 AM          
  [facility] - Inpatient Rehabilitation Department   Phone: [phone]    Occupational Therapy    [] Initial Evaluation            [x] Daily Treatment Note         [] Discharge Summary      Patient: Kojo Gracia   : 1965   MRN: [id number]   Date of Service:  2025    Admitting Diagnosis:  Atrial fibrillation with rapid ventricular response (HCC)  Current Admission Summary: Per ED note, \"Kojo Gracia is a 59 y.o. male who presents to the emergency department with chest pain.  This is a 59-year-old male who presents with chest pain he describes as chest burning.  He describes some dizziness and shortness of breath with it.  It started about 30 minutes prior to arrival.  Incidentally, the patient also has a history of a ascending thoracic aneurysm around 5.1 cm.  The patient was due to get a CT later on this week for this. \"  Past Medical History:  has a past medical history of Fatty liver, Hyperlipidemia, Hypertension, and Pancreatitis.  Past Surgical History:  has a past surgical history that includes knee surgery (Right, 2021); Steroid injection knee (Right); Cardiac procedure (N/A, 2025); and Coronary artery bypass graft (N/A, 2025).    Discharge Recommendations: Kojo Gracia scored a 17/24 on the AM-PAC ADL Inpatient form. Current research shows that an AM-PAC score of 17 or less is typically not associated with a discharge to the patient's home setting. Based on the patient's AM-PAC score and their current ADL deficits, it is recommended that the patient have 5-7 sessions per week of Occupational Therapy at d/c to increase the patient's independence.  At this time, this patient demonstrates complex nursing, medical, and rehabilitative needs, and would benefit from intensive rehabilitation services upon discharge from the Inpatient setting.  This patient demonstrates the ability to participate in and benefit from an intensive therapy program with a coordinated 
  [facility] - Inpatient Rehabilitation Department   Phone: [phone]    Physical Therapy    [] Initial Evaluation            [x] Daily Treatment Note         [] Discharge Summary      Patient: Kojo Gracia   : 1965   MRN: [id number]   Date of Service:  2025  Admitting Diagnosis: Atrial fibrillation with rapid ventricular response (HCC)  Current Admission Summary: Kojo Gracia is a 59 y.o. male  who presents with Atrial fibrillation. He is s/p CABG x 4 and aortic valve replacement on 24.   Past Medical History:  has a past medical history of Fatty liver, Hyperlipidemia, Hypertension, and Pancreatitis.  Past Surgical History:  has a past surgical history that includes knee surgery (Right, 2021); Steroid injection knee (Right); Cardiac procedure (N/A, 2025); and Coronary artery bypass graft (N/A, 2025).  Discharge Recommendations: Kojo Gracia scored a 18/24 on the AM-PAC short mobility form. Current research shows that an AM-PAC score of 18 or greater is typically associated with a discharge to the patient's home setting. Despite low AMPAC score, expect patient will improve functionally with improvement in medical status. Based on the patient's AM-PAC score and their current functional mobility deficits, it is recommended that the patient have 2-3 sessions per week of Physical Therapy at d/c to increase the patient's independence.  At this time, this patient demonstrates the endurance and safety to discharge home with HHPT and a follow up treatment frequency of 2-3x/wk.  Please see assessment section for further patient specific details.    HOME HEALTH CARE: LEVEL 1 STANDARD    - Initial home health evaluation to occur within 24-48 hours, in patient home   - Therapy to evaluate with goal of regaining prior level of functioning   - Therapy to evaluate if patient has Home Health Aide needs for personal care   If patient discharges prior to next session this note 
  [facility] - Inpatient Rehabilitation Department   Phone: [phone]    Occupational Therapy    [] Initial Evaluation            [x] Daily Treatment Note         [] Discharge Summary      Patient: Kojo Gracia   : 1965   MRN: [id number]   Date of Service:  2025    Admitting Diagnosis:  Atrial fibrillation with rapid ventricular response (HCC)  Current Admission Summary: Per ED note, \"Kojo Gracia is a 59 y.o. male who presents to the emergency department with chest pain.  This is a 59-year-old male who presents with chest pain he describes as chest burning.  He describes some dizziness and shortness of breath with it.  It started about 30 minutes prior to arrival.  Incidentally, the patient also has a history of a ascending thoracic aneurysm around 5.1 cm.  The patient was due to get a CT later on this week for this. \"  Past Medical History:  has a past medical history of Fatty liver, Hyperlipidemia, Hypertension, and Pancreatitis.  Past Surgical History:  has a past surgical history that includes knee surgery (Right, 2021); Steroid injection knee (Right); Cardiac procedure (N/A, 2025); and Coronary artery bypass graft (N/A, 2025).    Discharge Recommendations: Kojo Gracia scored a 13/24 on the AM-PAC ADL Inpatient form. Current research shows that an AM-PAC score of 17 or less is typically not associated with a discharge to the patient's home setting. Based on the patient's AM-PAC score and their current ADL deficits, it is recommended that the patient have 5-7 sessions per week of Occupational Therapy at d/c to increase the patient's independence.  At this time, this patient demonstrates complex nursing, medical, and rehabilitative needs, and would benefit from intensive rehabilitation services upon discharge from the Inpatient setting.  This patient demonstrates the ability to participate in and benefit from an intensive therapy program with a coordinated 
  [facility]  H+P  Consult  OP Visit  FU Visit   CC/INTERVAL HX   Admit 1/13/2025   CC CAD, AS, Asc AA, AF   Subjective Doing well this am.  No cp, sob.  NSR.   Tele Reviewed available   EXAM   VS /70   Pulse 56   Temp 98.2 °F (36.8 °C) (Temporal)   Resp 16   Ht 1.753 m (5' 9\")   Wt 125.5 kg (276 lb 10.8 oz)   SpO2 96%   BMI 40.86 kg/m²    Gen Alert, coop, Ødistress Pulse 2+ and symmetric Head NC, AT, Ø abnorm   Heart RRR, 3/6 Abd  Soft, NT, +BS, Ømass Eyes PER, conj/corn clr   Lung CTAB, unlab, ØDTP Nck/Thr S/s, tm, nt, Øbru/jvd, lmt Nose Nares, Ø drain, nt   Ext Ext nl, AT,  ØC/C/E Neuro Nl gross m/s exam Lymph No cerv/ax LA   Ch Wall NT, no deform Skin Col/txt/trg nl, Ørash/les Psych Nl mood/affect      MEDICATIONS   Relevant cardiac medications Reviewed in EPIC   LABS   Hgb Lab Results   Component Value Date    HGB 16.2 01/15/2025      Cr Lab Results   Component Value Date    CREATININE 0.9 01/15/2025      Trop Lab Results   Component Value Date    CKTOTAL 270 10/24/2022    TROPHS 89 (H) 01/14/2025    TROPHS 48 (H) 01/13/2025    TROPHS 21 01/13/2025       ASSESSMENT TIME   More than 35 minutes spent reviewing patient chart (including but not limited to notes, labs, imaging and other testing), interviewing patient and/or family members, performing a physical exam, documentation of my findings above and subsequent follow-up of ordered testing.  More than 50% of that time spent face to face with patient discussing clinical condition and counseling regarding treatment plan.     ASSESSMENT AND PLAN   *AS  Status Severe AS, Bicuspid  TTE 10/23 55%, MG 29, Asc AA 5.2  Plan SAVR  *Asc AA  Status chronic  CTA 1/25 5.1 (stable)  Plan Surgical repair  *AF  Status New onset, RVR -> spontaneous conversion to NSR  Plan mMAZE and LAAC with OHS  *CAD  Status MVD  Plan CABG  
 Chest tubes meet criteria to remove per open heart protocol.   No  air leak.  no crepitus.  Pt instructed on procedure.   Site cleansed and prepped per protocol.  Chest tubes X 2, 1 right pleural and 1 left pleural removed without difficulty.  Dry sterile dressing applied.  Bilateral breath sounds audible.  O2 Sats 98 on 2 nasal cannula.  A and V wires secured to dressing.   Incision site within normal limits. Patient tolerated well.            [facility]  
 Chest tubes meet criteria to remove per open heart protocol.   No  air leak.  no crepitus.  Pt instructed on procedure. Site cleansed and prepped per protocol.  Chest tubes X 1 (Mediastinal) removed without difficulty.  Dry sterile dressing applied.  Bilateral breath sounds audible.  O2 Sats 94 on 3L nasal cannula. Incision site within normal limits. Patient tolerated well.      
Bedside Spirometer complete. Results placed in chart.  
CLINICAL PHARMACY NOTE: MEDS TO BEDS    Total # of Prescriptions Filled:  12   The following medications were delivered to the patient:  Furosemide 40mg  Clopidogrel 75mg  Metoprolol succinate ER 75mg  Torsemide 20mg  Aspirin 81mg  Tamsulosin 0.4mg  Pantoprazole 40mg  Oxycodone 5mg  Stimulant laxative 8.6/50mg  Potassium cl ER 20meq  Atorvastatin 80mg  Amox/Clav 875/125mg    Additional Documentation:     ELIZA Ross approved medication delivery    Medications were delivered to patient's room and patient's wife Radha signed for    Ariane Casey CPhT   
CV SURGERY ATTENDING  POD 2 bioAVR, tubular ascending aortic replacement, CABG x 4, modified Maze  Doing OK, but has a serious nagging cough that seem like he has a mucous ball in his trachea that he cannot expectorate. With thism he has not slept and is in a mild degree of respiratory distress.    100/99.2-77SR-129/66-94% (4L)  Weight 130.8kg (124.7kg pre-op)  Net +1.8L last 24 hours despite 2 doses of IV Lasix yesterday and last night (20/40mg).  Left chest drain 360/24hr-150/12hr, right chest drain 320/24hr-200/12hr    CXR shows bilateral small infiltrates - possibly some aspiration vs CHF/early PNA    140-3.8-366-.2(1.3) Mg 2  18K(14.5K)-30%(35%)-109K(129K)    Still on substantial insulin gtt (6+u/hr) despite Lantus 30.  ecASA, Lipitor 80, colchicine    PLAN  Bumex drip to remove pulmonary edema as etiology of cough/infiltrates.  Zosyn IV for infiltrates and increasing WBC.   Lots of chest physiotherapy, guaifenesin, OK for phenergan with codeine to suppress the cough; sternal closure will be at risk with the incessant coughing too, remind him to hug himself/pillow.  Start low-dose MTP 12.5 BID with hold parameters.  Need to keep a close eye on him - he could easily tire out and manifest a respiratory event or arrest - would have a low threshold for BIPAP if he can tolerate it (could use low dose Precedex to gently sedate him to minimize cough and tolerance of BIPAP until this clears up).  Re-assess chest drains later today to see if we should get one or more out to help with respiration - and these sometimes trigger cough too.  Can't really ambulate yet; bedside PT/OT for now.  
CV SURGERY ATTENDING  POD 3 bioAVR, tubular ascending aortic replacement, CABG x 4, modified Maze  Much better today - incessant cough treated with diuresis, Zosyn, Phenergan and chest tube removal.  He finally got a good night of sleep too.    99-70SR-115/66-95% (3L)  Net -2.5L/24hrs  Weight 129.9kg (130.8 yest, 124.7kg pre-op)  Left tube out, right tube 240cc/24hr-100cc/12hr    CXR - improved infiltrates    136-4.0-96-28-31/1.4 (1.2,1.3,1.1)  18.8K(17.7K)-28.5%(29.8%)-116K(up)    Neuro OK, chest incision healing fine, abdomen soft, substantial leg edema    APAP, ecASA 81, atorvastatin 80, SQH 7500 q8, Lantus 30, MTP 12.5 BID, Flomax, Zosyn  Bumex gtt 0.5/hr  Colchicine on hold currently    PLAN  Much better.  Remove last chest drain, help pulm toilet and comfort.  Continue Bumex gtt to same goal: -2 to 3L/24 hours, probably change to bolus diuretics by tomorrow. Keep peña in while on gtt.  Repeat chem 7 at 4pm.  Add IV iron.  Continue Zosyn for now, chest physiotherapy, ambulate.  Bowel regimen, promote BM.  Increase Lantus dose to 45u.  Continue to hold colchicine today, see if we can re-introduce it tomorrow (creatinine).  Dispo planning once off Bumex gtt - probably discharge in ~48 hrs.    
CV SURGERY ATTENDING  Patient seen and examined, chart and images reviewed.  He came in with acute coronary syndrome and found to have severe BAV-AS and multi-vessel CAD (without LAD involvement). He also has a known >5 cm tubular ascending aortic aneurysm, and on the CTA from this admission it looks like he had an intimal tear that begins at the strike-point of aortic ejection to the greater curve of the ascending aneurysm and extends up to the base of the innominate artery. The healed intimal tear is linear and densely calcified. There is no family history of aneurysm, BAV/valve surgery or sudden death. His presentation included transient atrial fibrillation.  Dr. Cope and I are planning for AVR (hasn't decided on mechanical vs tissue yet), ascending aortic replacement with open-arch distal anastomosis with hypothermic circulatory arrest and cerebral perfusion (the calcified segment should ideally not be clamped to minimize his stroke risk), CABG (veins to PDA, 2 OMS, Diag) and a modified Maze (probably [facility], LOM, LAAO). I estimated the risk of death to be 3-5% and stroke risk in the 3% range, and discussed the other usual risks (renal, bleeding, other) and he is agreeable to proceeding tomorrow.  Thank you kindly for the referral.  
Cardiology consult received. Patient with chest discomfort and new atrial fibrillation with rvr. EKG with subendocardial ischemia pattern. Recommend CTA to evaluate for dissection given known aortic aneurysm. AV rangel blocker and repeat EKG when heart rate controlled.   
Contacted Dr. Cooley regarding patient unable to clear secretions and unable to sleep. New orders. See Mar. Physiotherapy also ordered. Will continue to provide care.   
Criteria met per open heart protocol to discontinue [place].  Procedure explained to patient. Leadwood pulled with no complications.  Patient tolerated well.     
Cta without dissection. Start heparin drip, replace magnesium. Avoid nitro given aortic stenosis. Give iv digoxin. Repeat ekg and trend troponins. Admit to cvu. Call with any changes. Npo after midnight 
Nutrition Note    RECOMMENDATIONS  PO Diet: Add 5 carb choice modifier when diet is resumed  ONS: Order Ensure HP once daily at breakfast when diet is resumed    ASSESSMENT   Pt is s/p CABG x4/AVR yesterday. Nursing nutrition screen indicates pt reported poor po intake d/t decreased po intake pta. Upon visiting, pt reported no issues with appetite/po intake pta just was not eating healthy. Wt hx in EMR indicates no siginificant wt changes prior. Willing to receive ONS to offer additional protein once diet is started. RD will monitor for adequate po intake and provide cardiac diet education prior to discharge.     Malnutrition Status: No malnutrition    NUTRITION DIAGNOSIS   Increased nutrient needs related to increase demand for energy/nutrients as evidenced by s/p surgery    Goals: PO intake 50% or greater, by next RD assessment     NUTRITION RELATED FINDINGS  Objective: Vasopressin documented at 0.04 units/min. LBM 1/14. Non-pitting generalized edema.  Wounds: Multiple, Surgical Incision    CURRENT NUTRITION THERAPIES  Diet NPO  ADULT DIET; Regular; Low Fat/Low Chol/High Fiber/2 gm Na; 1500 ml       PO Intake: NPO   PO Supplement Intake:NPO    ANTHROPOMETRICS  Current Height: 175.3 cm (5' 9\")  Current Weight - Scale: 126 kg (277 lb 12.5 oz)    Admission weight: 126.4 kg (278 lb 10.6 oz)  Ideal Body Weight (IBW): 160 lbs  (73 kg)    Usual Bodyweight  (270s lb per pt report)       BMI: 41    COMPARATIVE STANDARDS  Total Energy Requirements (kcals/day): 4019-4771     Protein (g):         Fluid (mL/day):  2065    EDUCATION  Education/Counseling needed     The patient will be monitored per nutrition standards of care. Consult dietitian if additional nutrition interventions are needed prior to RD reassessment.     Susan Cee MS, RD, LD    Contact: [phone]   
Nutrition Note    RECOMMENDATIONS  PO Diet: Continue current diet  ONS: Ordered Gelatein once daily. Will change Ensure HP to Ensure Compact d/t lowered fluid restriction     ASSESSMENT   Pt triggered for follow-up. On 5 carb choice, cardiac restricted diet; fluid restriction lowered to 1200 mL yesterday per CVTS. Receiving Ensure HP once daily at breakfast. Documented meal intakes of 0% or 51-75% with note of family bringing in some food for pt. Upon visiting, pt reported appetite is so/so, drinking Ensure at times but observed 2 unopened in room and wants to keep receiving only once daily. Willing to try Gelatein in addition. Only grapes consumes for breakfast this morning. RD will continue to monitor for adequate po intake.     Malnutrition Status: No malnutrition    NUTRITION DIAGNOSIS   Inadequate oral intake related to decreased appetite, acute injury/trauma as evidenced by variable po intake  Increased nutrient needs related to increase demand for energy/nutrients as evidenced by s/p surgery    Goals: PO intake 50% or greater, by next RD assessment     NUTRITION RELATED FINDINGS  Objective: -6.1L per I/Os. Na+ 134. Glucose 108. LBM 1/22. Edema: generalized pitting; +1 pitting BUE, BLE.  Wounds: Multiple, Surgical Incision    CURRENT NUTRITION THERAPIES  ADULT ORAL NUTRITION SUPPLEMENT; Breakfast; Low Calorie/High Protein Oral Supplement  ADULT DIET; Regular; 5 carb choices (75 gm/meal); Low Fat/Low Chol/High Fiber/2 gm Na; 1200 ml       PO Intake: 0%, 51-75%   PO Supplement Intake:Unable to assess    ANTHROPOMETRICS  Current Height: 175.3 cm (5' 9\")  Current Weight - Scale: 127 kg (279 lb 15.8 oz)    Admission weight: 126.4 kg (278 lb 10.6 oz)  Ideal Body Weight (IBW): 160 lbs  (73 kg)    Usual Bodyweight  (270s lb per pt report)       BMI: 41.3    COMPARATIVE STANDARDS  Total Energy Requirements (kcals/day): 2065-2891     Protein (g):         Fluid (mL/day):  2065    EDUCATION  Education/Counseling 
Order received for arterial line removal.  Left rad  arterial line discontinued.  Manual pressure held for 10 minutes and tegaderm on site.  No hematoma, no oozing noted.  Patient tolerated well.         VICENTA  
Patient VSS, he denies pain and SOB. Surgical incisions clean dry and intact. He has had multiple BM since yesterday. Neal was removed by CT surgery NP and has voided post removal. He has been up ambulating in the room and halls. Encouraging pulmonary toileting. Safety precautions in place  
Patient admitted to CVU from CVOR and attached to ventilator and monitors.  Report received from anesthesiologist.  Chest x-ray ordered.  Labs drawn and sent.  Assessment complete.  Continue monitoring hemodynamics.  Family let back to see patient.  Visiting hours reviewed and all questions answered. Family aware of discharge class. Primary RN Opal.    RECOVERY PERIOD BEGINS  2020  
Patient ate nothing on meals trays today or food that patient's family bought in. RN attempted 2x to walk patient, made it into the [place] with walker,patient c/o being extremely dizzy so they returned to his room.   Ortho BP's done with little change in numbers.      WCM  
Patient awake and following commands.  Patient placed on CPAP per open heart protocol.  ABG drawn 30 minutes later and WNL.  Respiratory called for weaning parameters.  NIF - 20.  Patient suctioned and extubated to 4 liters nasal cannula. Patient tolerated well.  Oxygen saturation 97 on 4 liters nasal cannula.  Patient alert and oriented X 3.       RECOVERY PERIOD ENDS 0105    Electronically signed by Opal Perez RN on 1/17/2025 at 1:10 AM      
Patient ready for discharge to home. Wife at bedside. Belongings packed, CVC and PIV removed with no complications. Open heart discharge instructions and medications reviewed with patient and wife. They verbalized understanding. Patient transported to vehicle by RN via wheelchair  
Patient was able to eat a few small slices of pizza this evening that family bought in.  
Pharmacy Home Medication Reconciliation Note    A medication reconciliation has been completed for Kojo Gracia 1965    Pharmacy: [facility] [address] [facility], [place], [place]  Information provided by: patient    The patient's home medication list is as follows:  No current facility-administered medications on file prior to encounter.     Current Outpatient Medications on File Prior to Encounter   Medication Sig Dispense Refill    fenofibric acid (TRILIPIX) 135 MG CPDR capsule Take 1 capsule by mouth daily      ibuprofen (ADVIL;MOTRIN) 800 MG tablet Take 1 tablet by mouth every 8 hours as needed for Pain      Garlic 500 MG TABS Take 500 mg by mouth every evening      niacin 500 MG tablet Take 1 tablet by mouth every evening      aspirin 81 MG EC tablet Take 1 tablet by mouth daily      lisinopril (PRINIVIL;ZESTRIL) 10 MG tablet Take 1 tablet by mouth daily      omeprazole (PRILOSEC) 40 MG delayed release capsule Take 1 capsule by mouth daily      tadalafil (CIALIS) 5 MG tablet Take 1 tablet by mouth as needed for Erectile Dysfunction      bisoprolol-hydroCHLOROthiazide (ZIAC) 10-6.25 MG per tablet Take 1 tablet by mouth daily      ezetimibe (ZETIA) 10 MG tablet Take 1 tablet by mouth daily      colesevelam (WELCHOL) 625 MG tablet Take 1 tablet by mouth 2 times daily (with meals)      Cholecalciferol (VITAMIN D3) 125 MCG (5000 UT) TABS Take 1 tablet by mouth (Patient not taking: Reported on 6/30/2024)         Patient is no longer taking Vitamin D.    Of note, patient took all AM meds only prior to ED arrival.    Timing of last doses updated.    Thank you,  Trinidad Burciaga, CAMILLAhT      
Physical Therapy             Kojo Gracia  PT treatment session attempted but the pt was finishing walking with RN.  Pt requested PT return at a later time.  PT will follow-up with this pt as the schedule allows.  Thanks.  Karina Valenzuela, PT DPT [id number]      PT returned in the afternoon and the pt had just returned to bed with nursing. PT will follow-up with this pt as the schedule allows.  Thanks.  Karina Valenzuela, PT DPT [id number]    
Pt verified information regarding surgery, name, birth date, surgeon, procedure and allergies: nka. Patient transferred to [facility] for surgery. Appropriate antibiotics ordered: ancef, vanco. Beta blocker: na.  Lab work within normal limits, 2 units of RBC's on call to OR, vital signs stable, Mepilex sacral border applied and 2% chlorhexidine gluconate skin prep given. Patient and family educated about surgery and pain management. Arterial line placed in left radial. To surgery per bed.    
RT called for nebulizer solution. During downtime but breathing treatments is done.   
Reached out for additional pain medications for patient. See mar.   
Repeat ekg with improvement in st depressions. Discussed with nursing, patient’s heart rate has improved. Call with any questions. 
Room air trial done sats = 88/90, will resume 2L NC for now.        WCM  
Shift: 1/17 DAY    Procedure: CORONARY ARTERY BYPASS GRAFTING X 4, AORTIC VALVE REPLACEMENT WITH 25MM EPIC MAX AORTIC VALVE, ASENDING AORTIC ANEURYSM REPLACEMENT WITH 34MM X 10MM HEMASHIELD PLATINUM GRAFT, LEFT ATRIAL APPENDAGE WITH 40 MM ATRICLIP, TOTAL CARDIOPULMONARY BYPASS, PAOLO, MEDISTIM FLOW VERIFICATION OF BYPASS GRAFTS, ENDOSCOPIC VEIN HARVEST OF RIGHT SAPHENOUS VEIN, INSERTION OF TEMPORARY ATRIAL AND VENTRICULAR PACING WIRES, MODIFIED MAZE,     Admit from OR (time and date): 1/16 2030    Transition (time and date): 1/17 0105    Surgery, return to OR no     Nursing assessment at handoff  stable    Most recent vitals: BP (!) 122/50   Pulse 80   Temp 99.4 °F (37.4 °C) (Bladder)   Resp 20   Ht 1.753 m (5' 9\")   Wt 126 kg (277 lb 12.5 oz)   SpO2 94%   BMI 41.02 kg/m²      Increased O2 requirements: no O2 requirements: Oxygen Therapy  SpO2: 94 %  Pulse Oximetry Type: Continuous  Pulse via Oximetry: 86 beats per minute  Pulse Oximeter Device Mode: Continuous  Pulse Oximeter Device Location: Finger  O2 Device: Nasal cannula  FiO2 : 40 %  PEEP/CPAP (cm H2O): 5 cm H20  O2 Flow Rate (L/min): 3.5 L/min  Oxygen Therapy: None (Room air)  Vent Mode: (S) CPAP/PS     Admission weight Weight - Scale: 124.7 kg (275 lb)  Today's weight   Wt Readings from Last 1 Encounters:   01/17/25 126 kg (277 lb 12.5 oz)         EF: ?    Drop in Urinary Output no     Rhythm Changes Paced DDD rate 80 and occasional PVCs    Pacing Wires Removed:  [] Yes  [x] NO  [] Platelets < 50,000  [] Arrhythmia  [x] Bradycardia [] Valve Replacement  [x] Pacing for Cardiac Index  []Physician Order    Lines/Drains  LDA Insertion Date Discontinued Date Dressing Changes   Art line 1/16     Central Line 1/16     Neal 1/16     Chest Tube 1/16 mediastinal  1/16 Right pleural  1/16 Left pleural 1/17 mediastinal      Wires  1/16     ETT 1/16 1/17 0105    BRIDGETTE Drain N/A     VasCath N/A     Impella N/A       Interventions After Office Hours  Problem(Brief) Date 
Shift: 1/20 7a-7p    Procedure: CORONARY ARTERY BYPASS GRAFTING X 4, AORTIC VALVE REPLACEMENT WITH 25MM EPIC MAX AORTIC VALVE, ASENDING AORTIC ANEURYSM REPLACEMENT WITH 34MM X 10MM HEMASHIELD PLATINUM GRAFT, LEFT ATRIAL APPENDAGE WITH 40 MM ATRICLIP, TOTAL CARDIOPULMONARY BYPASS, PAOLO, MEDISTIM FLOW VERIFICATION OF BYPASS GRAFTS, ENDOSCOPIC VEIN HARVEST OF RIGHT SAPHENOUS VEIN, INSERTION OF TEMPORARY ATRIAL AND VENTRICULAR PACING WIRES, MODIFIED MAZE,     Admit from OR (time and date): 1/16 2030    Transition (time and date): 1/17 0105    Surgery, return to OR no     Nursing assessment at handoff  stable    Most recent vitals: BP (!) 110/49   Pulse 57   Temp 98.4 °F (36.9 °C) (Temporal)   Resp 18   Ht 1.753 m (5' 9\")   Wt 126.8 kg (279 lb 8.7 oz)   SpO2 98%   BMI 41.28 kg/m²      Increased O2 requirements: no O2 requirements: Oxygen Therapy  SpO2: 98 %  Pulse Oximetry Type: Continuous  Pulse via Oximetry: 86 beats per minute  Pulse Oximeter Device Mode: Intermittent  Pulse Oximeter Device Location: Right, Finger  O2 Device: None (Room air)  FiO2 : 40 %  PEEP/CPAP (cm H2O): 5 cm H20  O2 Flow Rate (L/min): 1 L/min  Oxygen Therapy: None (Room air)  Vent Mode: (S) CPAP/PS     Admission weight Weight - Scale: 124.7 kg (275 lb)  Today's weight   Wt Readings from Last 1 Encounters:   01/20/25 126.8 kg (279 lb 8.7 oz)         EF: ?    Drop in Urinary Output no     Rhythm Changes Paced DDD rate 80 and occasional PVCs    Pacing Wires Removed:  [] Yes  [x] NO  [] Platelets < 50,000  [] Arrhythmia  [x] Bradycardia [] Valve Replacement  [x] Pacing for Cardiac Index  []Physician Order    Lines/Drains  LDA Insertion Date Discontinued Date Dressing Changes   Art line 1/16     Central Line 1/16     Neal 1/16     Chest Tube 1/16 mediastinal  1/16 Right pleural  1/16 Left pleural 1/17 mediastinal      Wires  1/16     ETT 1/16 1/17 0105    BRIDGETTE Drain N/A     VasCath N/A     Impella N/A       Interventions After Office 
Shift: POD 0, 7p-7a    Procedure: CORONARY ARTERY BYPASS GRAFTING X 4, AORTIC VALVE REPLACEMENT WITH 25MM EPIC MAX AORTIC VALVE, ASENDING AORTIC ANEURYSM REPLACEMENT WITH 34MM X 10MM HEMASHIELD PLATINUM GRAFT, LEFT ATRIAL APPENDAGE WITH 40 MM ATRICLIP, TOTAL CARDIOPULMONARY BYPASS, PAOLO, MEDISTIM FLOW VERIFICATION OF BYPASS GRAFTS, ENDOSCOPIC VEIN HARVEST OF RIGHT SAPHENOUS VEIN, INSERTION OF TEMPORARY ATRIAL AND VENTRICULAR PACING WIRES, MODIFIED MAZE,     Admit from OR (time and date): 2022    Transition (time and date): N/A    Surgery, return to OR no     Nursing assessment at handoff  stable    Most recent vitals: BP (!) 104/59   Pulse 80   Temp 98.6 °F (37 °C) (Bladder)   Resp 20   Ht 1.753 m (5' 9\")   Wt 126 kg (277 lb 12.5 oz)   SpO2 94%   BMI 41.02 kg/m²      Increased O2 requirements: no O2 requirements: Oxygen Therapy  SpO2: 94 %  Pulse Oximetry Type: Continuous  Pulse via Oximetry: 86 beats per minute  Pulse Oximeter Device Mode: Continuous  Pulse Oximeter Device Location: Finger  O2 Device: Nasal cannula  FiO2 : 40 %  PEEP/CPAP (cm H2O): 5 cm H20  O2 Flow Rate (L/min): 4 L/min  Oxygen Therapy: None (Room air)  Vent Mode: (S) CPAP/PS     Admission weight Weight - Scale: 124.7 kg (275 lb)  Today's weight   Wt Readings from Last 1 Encounters:   01/17/25 126 kg (277 lb 12.5 oz)         EF: 60%    Drop in Urinary Output no     Rhythm Changes Paced, AV paced @ 80    Pacing Wires Removed:  [] Yes  [x] NO  [] Platelets < 50,000  [] Arrhythmia  [] Bradycardia [] Valve Replacement  [] Pacing for Cardiac Index  []Physician Order    Lines/Drains  LDA Insertion Date Discontinued Date Dressing Changes   Art line 1/16/25     Central Line 1/16/25     Neal 1/16/25     Chest Tube 1/16/25-RP, M LP     Wires  1/16/25     ETT 1/16/25 1/17/25 @ 0105    BRIDGETTE Drain      VasCath      Impella        Interventions After Office Hours  Problem(Brief) Date Time Intervention Physician contacted                                    
Xray at bedside and labs sent    Electronically signed by Kathi Parmar RN on 1/16/2025 at 8:33 PM    
52 mmHg. AV peak velocity is 4.5 m/s. AV area by continuity VTI is 1.3 cm2.    Mitral Valve: Annular calcification. Mildly thickened leaflets. Mild regurgitation.    Left Atrium: Left atrium is dilated.    Aorta: Mildly dilated aortic root. Ao root diameter is 4.2 cm. Dilated ascending aorta. Ao ascending diameter is 5.3 cm.    Image quality is adequate. Contrast used: Lumason.      [facility] Operative Note          PROCEDURE SUMMARY   Procedure [facility]   Indication UA   Consent Obtained   Pre-Sedation Pre-sedation note completed.  Immediately prior to procedure, patient was reassessed and pre-sedation assessment and strategy remained unchanged.    Post-Sedation After procedure, I personally supervised the patient until awake, alert, following commands, breathing independently and hemodynamically stable.   Access RRA   US US guidance used to determine artery patency, size (>2mm), anatomic variations and ideal puncture location.  Real-time US utilized concurrent with vascular needle entry into artery.  Image(s) permanently recorded and reported in chart.   Bleed Risk Low   Sedation Minimal conscious sedation for comfort.  Independent trained observer pushed medications at my direction. Level of consciousness and vital signs/physiologic status monitored throughout the procedure (see start and stop times above, as well as medication dosages).   Specimens None   Diagnostic Detail RADIAL ACCESS - Patient to cath lab in postabsorptive state, informed consent obtained. Radial site prepped and draped in normal sterile fashion. Needle used to access artery with US guidance and wire advanced into artery.  Sheath advanced over wire into artery.  JL advanced over wire to engage LMCA and JR advanced over wire to engage RCA and image in multiple views. JR/Pigtail advancd over wire into LV and LVEDP obtained, pullback gradient obtained.  Arterial hemostasis obtained with radial band.             CORONARY FINDINGS 
:    IMPRESSION:  Unchanged ascending thoracic aortic aneurysm measuring up to 5.1 cm in  diameter.  No evidence of thoracic aortic dissection.     No acute thoracic abnormality identified.    ECHO:  1/ 14/25     Left Ventricle: Hyperdynamic left ventricular systolic function with a visually estimated EF of 70%. Left ventricle size is normal. Moderately increased wall thickness. Normal wall motion. Diastolic dysfunction with normal LAP.    Aortic Valve: Bicuspid valve.  Thickened, calcified. Severe stenosis of the aortic valve. AV mean gradient is 52 mmHg. AV peak velocity is 4.5 m/s. AV area by continuity VTI is 1.3 cm2.    Mitral Valve: Annular calcification. Mildly thickened leaflets. Mild regurgitation.    Left Atrium: Left atrium is dilated.    Aorta: Mildly dilated aortic root. Ao root diameter is 4.2 cm. Dilated ascending aorta. Ao ascending diameter is 5.3 cm.    Image quality is adequate. Contrast used: Lumason.      [facility] Operative Note          PROCEDURE SUMMARY   Procedure [facility]   Indication UA   Consent Obtained   Pre-Sedation Pre-sedation note completed.  Immediately prior to procedure, patient was reassessed and pre-sedation assessment and strategy remained unchanged.    Post-Sedation After procedure, I personally supervised the patient until awake, alert, following commands, breathing independently and hemodynamically stable.   Access RRA   US US guidance used to determine artery patency, size (>2mm), anatomic variations and ideal puncture location.  Real-time US utilized concurrent with vascular needle entry into artery.  Image(s) permanently recorded and reported in chart.   Bleed Risk Low   Sedation Minimal conscious sedation for comfort.  Independent trained observer pushed medications at my direction. Level of consciousness and vital signs/physiologic status monitored throughout the procedure (see start and stop times above, as well as medication dosages).   Specimens None 
calcified. Severe stenosis of the aortic valve. AV mean gradient is 52 mmHg. AV peak velocity is 4.5 m/s. AV area by continuity VTI is 1.3 cm2.    Mitral Valve: Annular calcification. Mildly thickened leaflets. Mild regurgitation.    Left Atrium: Left atrium is dilated.    Aorta: Mildly dilated aortic root. Ao root diameter is 4.2 cm. Dilated ascending aorta. Ao ascending diameter is 5.3 cm.    CTA Chest: 1/13/25  Unchanged ascending thoracic aortic aneurysm measuring up to 5.1 cm in diameter.  No evidence of thoracic aortic dissection.  No acute thoracic abnormality identified.    Cath: 1/14/25  Artery Findings  LM Normal  LAD 30% mid, D1 70% ostial  Cx 70% mid, OM1 70% ostial  RCA 50% proximal tubular, 70% mid-distal  Dominance RCA Dominant  LVEDP NA Normal 3-12mmHg  LVG EF NA Normal >/= 55%  LVG WM NA  AVG NA     Problem List:   Patient Active Problem List    Diagnosis Date Noted    Chest pain 01/14/2025    Atrial fibrillation with rapid ventricular response (HCC) 01/13/2025        Assessment and Plan:     1.Paroxysmal Atrial Fibrillation  - Currently in NSR  - Continue cardizem 120 mg QD  - [id number] score:1; [id number] Vasc score and anticoagulation discussed.    ~ No need for anti-coagulation at this time    - Afib risk factors including age, HTN, obesity, inactivity and SID were discussed with patient. Risk factor modification recommended   ~ TSH normal (1/2025)   ~ Needs outpatient sleep study      - He had new onset atrial fibrillation this admission. He converted spontaneously with IV cardizem. He will benefit from MAZE and CANDACE ligation with his surgery. Discussed risk of post op AF following CABG/AVR. Will see him following surgical recovery in office to determine long term plan regarding his AF. Recommend he complete outpatient sleep study and work on lifestyle modification    2.Bradycardia   - He is bradycardic overnight; likely due to untreated sleep apnea    3.CAD,  Aortic Stenosis   - Severe AS with

## 2025-01-28 ENCOUNTER — OFFICE VISIT (OUTPATIENT)
Age: 60
End: 2025-01-28

## 2025-01-28 VITALS
WEIGHT: 263 LBS | SYSTOLIC BLOOD PRESSURE: 110 MMHG | DIASTOLIC BLOOD PRESSURE: 60 MMHG | HEIGHT: 69 IN | BODY MASS INDEX: 38.95 KG/M2 | OXYGEN SATURATION: 97 % | TEMPERATURE: 98.1 F | HEART RATE: 95 BPM

## 2025-01-28 DIAGNOSIS — Z95.1 S/P CABG X 4: Primary | ICD-10-CM

## 2025-01-28 DIAGNOSIS — Z95.828 S/P ASCENDING AORTIC REPLACEMENT: ICD-10-CM

## 2025-01-28 PROCEDURE — 99024 POSTOP FOLLOW-UP VISIT: CPT

## 2025-01-28 NOTE — PROGRESS NOTES
times; due to heavy lifting requirements patient may return to work 12 weeks s/p sx; pt agrees and understands     Follow up with Cardiology next month    Follow up in 3 weeks       Electronically signed by Aditya Coleman PA-C on 1/28/2025 at 1:05 PM

## 2025-02-03 ENCOUNTER — TELEPHONE (OUTPATIENT)
Age: 60
End: 2025-02-03

## 2025-02-03 NOTE — TELEPHONE ENCOUNTER
Spoke with Court WHEATLEY NP. Reviewed pic and status of rash. Recommended Benadryl tabs 25 mg every 6 hrs or use of Benadryl cream. If not improved he is to f/u with PCP for further intervention. Notified pt. Verbalized understanding and agrees with plan.

## 2025-02-03 NOTE — TELEPHONE ENCOUNTER
Call received from Natividad with [facility] [phone] reporting that pt has developed a red, raised rash on trunk including bilateral sides, front, back and neck. Itching progressively worsening. Started 2-3 days ago. No change in diet or laundry products. Will discuss with APPs and return call to pt/ C RN with plan.

## 2025-02-10 ENCOUNTER — TELEPHONE (OUTPATIENT)
Age: 60
End: 2025-02-10

## 2025-02-10 NOTE — TELEPHONE ENCOUNTER
Pt is s/p CABG/AVR/AAA replacement and Modified MAZE performed on 1/16/25. Calls today to report that he is having some mild epistaxis today.  has some dried clots when blowing his nose but no apparent active bleeding is noted.  was using fireplace yesterday. No humidification in the home.   Suggested use of Saline Nasal spray followed by PSO for moisture. May add humidity to home by simmering a pan of water on the stove or placing pan over a heating duct register. Verbalized understanding and agrees with plan. Will report back especially if symptoms worsen. Continue to monitor.

## 2025-02-17 NOTE — PROGRESS NOTES
Cardiothoracic Surgery Outpatient Follow up      Surgery: 1/16/25  CORONARY ARTERY BYPASS GRAFTING X 4, AORTIC VALVE REPLACEMENT WITH 25MM EPIC MAX AORTIC VALVE, ASENDING AORTIC ANEURYSM REPLACEMENT WITH 34MM X 10MM HEMASHIELD PLATINUM GRAFT, LEFT ATRIAL APPENDAGE WITH 40 MM ATRICLIP, TOTAL CARDIOPULMONARY BYPASS, PAOLO, MEDISTIM FLOW VERIFICATION OF BYPASS GRAFTS, ENDOSCOPIC VEIN HARVEST OF RIGHT SAPHENOUS VEIN, INSERTION OF TEMPORARY ATRIAL AND VENTRICULAR PACING WIRES, MODIFIED MAZE,                                                                                                                Physician: Dr. Cooley      Subjective: Mr. Gracia returns to the office for follow up s/p CABG x4/ AVR (25mm Epic Max) / ascending aorta aneurysm replacement (34mm x 10mm Hemashield) / CANDACE clip. At his previous visit he reported doing well overall with no new complaints. Today he reports doing well. Denies constipation, dysuria, pain with ambulation, shortness of breath, lightheadedness and dizziness. He has been eating well at home, sleep has been OK, however states he has been sleeping on his side. Sternal precautions were reviewed. BP / weight has been stable.       Vitals Signs:   /70 (Site: Left Upper Arm, Position: Sitting, Cuff Size: Medium Adult)   Pulse 91   Temp 97 °F (36.1 °C) (Temporal)   Ht 1.753 m (5' 9\")   Wt 117.2 kg (258 lb 6.4 oz)   SpO2 97%   BMI 38.16 kg/m²         Physicial Exam:   Heart: RRR no MGR   Respiratory: CTA Bilaterally   Chest: symmetrical expansion with inspiration and expirations; sternum stable; piece of retain TPW removed    Abdomen: +bowel sounds, non-tender   Extremities: BLE pulses intact; no edema to BLE  Neurological: A / O x4      Medications:   Current Outpatient Medications   Medication Sig Dispense Refill    aspirin 81 MG EC tablet Take 1 tablet by mouth daily 30 tablet 3    atorvastatin (LIPITOR) 80 MG tablet Take 1 tablet by mouth nightly 30

## 2025-02-18 ENCOUNTER — OFFICE VISIT (OUTPATIENT)
Age: 60
End: 2025-02-18

## 2025-02-18 VITALS
SYSTOLIC BLOOD PRESSURE: 130 MMHG | BODY MASS INDEX: 38.27 KG/M2 | HEIGHT: 69 IN | WEIGHT: 258.4 LBS | DIASTOLIC BLOOD PRESSURE: 70 MMHG | HEART RATE: 91 BPM | OXYGEN SATURATION: 97 % | TEMPERATURE: 97 F

## 2025-02-18 DIAGNOSIS — Z95.828 S/P ASCENDING AORTIC REPLACEMENT: ICD-10-CM

## 2025-02-18 DIAGNOSIS — Z95.1 S/P CABG X 4: Primary | ICD-10-CM

## 2025-02-18 PROCEDURE — 99024 POSTOP FOLLOW-UP VISIT: CPT

## 2025-02-19 ENCOUNTER — TELEPHONE (OUTPATIENT)
Dept: CARDIOLOGY CLINIC | Age: 60
End: 2025-02-19

## 2025-02-19 NOTE — TELEPHONE ENCOUNTER
NOTE:  Pt is wanting to know if he is to continue taking this med.  If he is he is needing a refill sent to his Pharmacy listed above.  Please call the Pt to discuss.  Thank you

## 2025-02-19 NOTE — TELEPHONE ENCOUNTER
Medication Refill    Medication needing refilled:  tamsulosin     Dosage of the medication:   0.4mg    How are you taking this medication (QD, BID, TID, QID, PRN):    Take 1 capsule by mouth daily     30 or 90 day supply called in: 90    When will you run out of your medication:    Which Pharmacy are we sending the medication to?:     [facility]  [address] [place] [id number]   Phone:  [phone]   Fax:   [phone]     NOTE:  Pt is wanting to know if he is to continue taking this med.  If he is he is needing a refill sent to his Pharmacy listed above.  Please call the Pt to discuss.  Thank you.

## 2025-02-21 NOTE — TELEPHONE ENCOUNTER
Aditya Coleman PA-C Deel, Melinda, RN4 hours ago (5:49 AM)       Patient does not need a refill on Flomax. Thank you.    Aditya Coleman PA-C       Called and spoke with patient and advised. Removed off patient's med list.    Reason for Disposition  • Taking a deep breath makes pain worse    Answer Assessment - Initial Assessment Questions  1. LOCATION: "Where does it hurt?"        Center   2. RADIATION: "Does the pain go anywhere else?" (e.g., into neck, jaw, arms, back)      no  3. ONSET: "When did the chest pain begin?" (Minutes, hours or days)       Since diagnosed with blood clot on 9/1  4. PATTERN "Does the pain come and go, or has it been constant since it started?"  "Does it get worse with exertion?"       Constant   5. DURATION: "How long does it last" (e.g., seconds, minutes, hours)      n/a  6. SEVERITY: "How bad is the pain?"  (e.g., Scale 1-10; mild, moderate, or severe)     - MILD (1-3): doesn't interfere with normal activities      - MODERATE (4-7): interferes with normal activities or awakens from sleep     - SEVERE (8-10): excruciating pain, unable to do any normal activities        Worse with activity   Now pain is moderate     8. PULMONARY RISK FACTORS: "Do you have any history of lung disease?"  (e.g., blood clots in lung, asthma, emphysema, birth control pills)      Diagnosed with blood clot on 9/1  9. CAUSE: "What do you think is causing the chest pain?"      Known blood clot   10. OTHER SYMPTOMS: "Do you have any other symptoms?" (e.g., dizziness, nausea, vomiting, sweating, fever, difficulty breathing, cough)     Worse with exertion     Patient states "when I go to the ER they take everyone else ahead.  I just sit there."    Protocols used: CHEST PAIN-ADULT-

## 2025-02-27 ENCOUNTER — OFFICE VISIT (OUTPATIENT)
Dept: CARDIOLOGY CLINIC | Age: 60
End: 2025-02-27

## 2025-02-27 ENCOUNTER — HOSPITAL ENCOUNTER (OUTPATIENT)
Age: 60
Discharge: HOME | End: 2025-02-27
Payer: COMMERCIAL

## 2025-02-27 VITALS
HEART RATE: 88 BPM | OXYGEN SATURATION: 98 % | SYSTOLIC BLOOD PRESSURE: 136 MMHG | BODY MASS INDEX: 39.1 KG/M2 | WEIGHT: 264 LBS | HEIGHT: 69 IN | DIASTOLIC BLOOD PRESSURE: 68 MMHG

## 2025-02-27 DIAGNOSIS — E78.2 MIXED HYPERLIPIDEMIA: ICD-10-CM

## 2025-02-27 DIAGNOSIS — Q23.81 AORTIC STENOSIS DUE TO BICUSPID AORTIC VALVE: ICD-10-CM

## 2025-02-27 DIAGNOSIS — Q23.0 AORTIC STENOSIS DUE TO BICUSPID AORTIC VALVE: ICD-10-CM

## 2025-02-27 DIAGNOSIS — I48.0 PAROXYSMAL ATRIAL FIBRILLATION (HCC): ICD-10-CM

## 2025-02-27 DIAGNOSIS — I25.10 CORONARY ARTERY DISEASE INVOLVING NATIVE CORONARY ARTERY OF NATIVE HEART WITHOUT ANGINA PECTORIS: ICD-10-CM

## 2025-02-27 DIAGNOSIS — I10 PRIMARY HYPERTENSION: ICD-10-CM

## 2025-02-27 DIAGNOSIS — I77.810 ASCENDING AORTA DILATATION: ICD-10-CM

## 2025-02-27 DIAGNOSIS — I25.10 CORONARY ARTERY DISEASE INVOLVING NATIVE CORONARY ARTERY OF NATIVE HEART WITHOUT ANGINA PECTORIS: Primary | ICD-10-CM

## 2025-02-27 LAB
ALBUMIN SERPL-MCNC: 3.8 G/DL (ref 3.4–5)
ALBUMIN/GLOB SERPL: 1.3 {RATIO} (ref 1.1–2.2)
ALP SERPL-CCNC: 88 U/L (ref 40–129)
ALT SERPL-CCNC: 22 U/L (ref 10–40)
ANION GAP SERPL CALCULATED.3IONS-SCNC: 11 MMOL/L (ref 3–16)
AST SERPL-CCNC: 21 U/L (ref 15–37)
BILIRUB SERPL-MCNC: 0.5 MG/DL (ref 0–1)
BUN SERPL-MCNC: 12 MG/DL (ref 7–20)
CALCIUM SERPL-MCNC: 9.7 MG/DL (ref 8.3–10.6)
CHLORIDE SERPL-SCNC: 103 MMOL/L (ref 99–110)
CHOLEST SERPL-MCNC: 171 MG/DL (ref 0–199)
CO2 SERPL-SCNC: 25 MMOL/L (ref 21–32)
CREAT SERPL-MCNC: 0.8 MG/DL (ref 0.8–1.3)
DEPRECATED RDW RBC AUTO: 15.4 % (ref 12.4–15.4)
GFR SERPLBLD CREATININE-BSD FMLA CKD-EPI: >90 ML/MIN/{1.73_M2}
GLUCOSE SERPL-MCNC: 118 MG/DL (ref 70–99)
HCT VFR BLD AUTO: 39.9 % (ref 40.5–52.5)
HDLC SERPL-MCNC: 32 MG/DL (ref 40–60)
HGB BLD-MCNC: 13.1 G/DL (ref 13.5–17.5)
LDL CHOLESTEROL: 113 MG/DL
MCH RBC QN AUTO: 29.9 PG (ref 26–34)
MCHC RBC AUTO-ENTMCNC: 32.9 G/DL (ref 31–36)
MCV RBC AUTO: 90.9 FL (ref 80–100)
PLATELET # BLD AUTO: 179 K/UL (ref 135–450)
PMV BLD AUTO: 8.5 FL (ref 5–10.5)
POTASSIUM SERPL-SCNC: 3.9 MMOL/L (ref 3.5–5.1)
PROT SERPL-MCNC: 6.7 G/DL (ref 6.4–8.2)
RBC # BLD AUTO: 4.39 M/UL (ref 4.2–5.9)
SODIUM SERPL-SCNC: 139 MMOL/L (ref 136–145)
TRIGL SERPL-MCNC: 129 MG/DL (ref 0–150)
VLDLC SERPL CALC-MCNC: 26 MG/DL
WBC # BLD AUTO: 6.2 K/UL (ref 4–11)

## 2025-02-27 PROCEDURE — 80053 COMPREHEN METABOLIC PANEL: CPT

## 2025-02-27 PROCEDURE — 80061 LIPID PANEL: CPT

## 2025-02-27 PROCEDURE — 36415 COLL VENOUS BLD VENIPUNCTURE: CPT

## 2025-02-27 PROCEDURE — 85027 COMPLETE CBC AUTOMATED: CPT

## 2025-02-27 NOTE — PROGRESS NOTES
weeks and no improvement, resume drug   CBC/CMP/lipid profile today  Cardiac rehab phase II   Referral for sleep apnea eval as recommended during his hospitalization   F/U as scheduled with EP NP : defer need for AAD therapy now that he's ~ 7 weeks post-op off amiodarone. May need to consider a 30 day event   F/U with general cardiology 3-4 months    I have addressed the patient's cardiac risk factors and adjusted pharmacologic treatment as needed. In addition, I have reinforced the need for patient directed risk factor modification.    Further evaluation will be based upon the patient's clinical course and testing results.    All questions and concerns were addressed to the patient. Alternatives to  treatment were discussed.     The patient  currently  is not smoking as of date of admission : 1/13/25. The risks related to smoking were reviewed with the patient. Recommend maintaining a smoke-free lifestyle.    Dual Antiplatelet therapy has been recommended / prescribed for this patient. Education conducted on adverse reactions including bleeding was discussed. The patient verbalizes understanding.    Pt is on a BB  Pt is no on an lisinopril :ace-i/ARB : neg CHF. Stopped during hospitalization  Pt is on a statin      Saturated fat diet discussed  Exercise program discussed : walks 1-2 hr total / day    Thank you for allowing to us to participate in the care of Kojo Gracia.      [facility]  Documentation of today's visit sent to PCP

## 2025-02-27 NOTE — PATIENT INSTRUCTIONS
Hold off on taking atorvastatin / lipitor : see if your rash resolves. If after 1-2 weeks and its no better, start taking it again    Appt with William 5/9    Appt with Dr. Christianson or myself in three

## 2025-02-28 ENCOUNTER — TELEPHONE (OUTPATIENT)
Dept: CARDIOLOGY CLINIC | Age: 60
End: 2025-02-28

## 2025-02-28 NOTE — TELEPHONE ENCOUNTER
Spoke to pt and relayed the message. Pt verbalized understanding. Pt would like to send Rx to pharmacy     VEENA Vizcarra - CNP    LDL not at goal ; he's holding lipitor as of yesterday (c/o rash) ; have him start crestor 20 mg daily starting in 2 weeks. Recheck labs 6 weeks of being on crestor

## 2025-03-01 RX ORDER — ROSUVASTATIN CALCIUM 20 MG/1
20 TABLET, COATED ORAL DAILY
Qty: 30 TABLET | Refills: 5 | Status: SHIPPED | OUTPATIENT
Start: 2025-03-01

## 2025-03-05 ENCOUNTER — TELEPHONE (OUTPATIENT)
Dept: CARDIAC REHAB | Age: 60
End: 2025-03-05

## 2025-03-05 NOTE — TELEPHONE ENCOUNTER
Called pt. to confirm cardiac rehab evaluation appt.  08:30 am. Coming.    ROSA CONDE was provided with the Stent Information Sheet.     The patient was educated that they have a ureteral stent to avoid major complications like blockage or leaking following kidney transplantation. The patient was educated that the ureteral stent is left in typically for 4 – 6 weeks and removed by the surgeon on an outpatient basis. The procedure was briefly explained to the patient and will be explained in further detail during the outpatient visit.     Patient was educated on appointment for stent removal and diet restrictions before scheduled procedure. The patient was also instructed to have someone with them to accompany them home as they will not be allowed to drive.     Contact information was provided to the patient for additional questions or if the stent is not removed by 6 weeks.     The patient was provided with written and verbal education about their ureteral stent. All questions and concerns were addressed appropriately with the patient. The patient demonstrated understanding of the information.     Time spent with patient 10 minutes.

## 2025-03-06 ENCOUNTER — HOSPITAL ENCOUNTER (OUTPATIENT)
Dept: CARDIAC REHAB | Age: 60
Setting detail: THERAPIES SERIES
Discharge: HOME | End: 2025-03-06
Payer: COMMERCIAL

## 2025-03-06 VITALS
WEIGHT: 260.8 LBS | RESPIRATION RATE: 18 BRPM | HEART RATE: 87 BPM | HEIGHT: 69 IN | SYSTOLIC BLOOD PRESSURE: 142 MMHG | DIASTOLIC BLOOD PRESSURE: 72 MMHG | BODY MASS INDEX: 38.63 KG/M2

## 2025-03-06 PROCEDURE — 93798 PHYS/QHP OP CAR RHAB W/ECG: CPT

## 2025-03-06 ASSESSMENT — PATIENT HEALTH QUESTIONNAIRE - PHQ9
10. IF YOU CHECKED OFF ANY PROBLEMS, HOW DIFFICULT HAVE THESE PROBLEMS MADE IT FOR YOU TO DO YOUR WORK, TAKE CARE OF THINGS AT HOME, OR GET ALONG WITH OTHER PEOPLE: NOT DIFFICULT AT ALL
SUM OF ALL RESPONSES TO PHQ QUESTIONS 1-9: 2
5. POOR APPETITE OR OVEREATING: NOT AT ALL
2. FEELING DOWN, DEPRESSED OR HOPELESS: NOT AT ALL
6. FEELING BAD ABOUT YOURSELF - OR THAT YOU ARE A FAILURE OR HAVE LET YOURSELF OR YOUR FAMILY DOWN: NOT AT ALL
9. THOUGHTS THAT YOU WOULD BE BETTER OFF DEAD, OR OF HURTING YOURSELF: NOT AT ALL
SUM OF ALL RESPONSES TO PHQ QUESTIONS 1-9: 2
1. LITTLE INTEREST OR PLEASURE IN DOING THINGS: NOT AT ALL
7. TROUBLE CONCENTRATING ON THINGS, SUCH AS READING THE NEWSPAPER OR WATCHING TELEVISION: NOT AT ALL
8. MOVING OR SPEAKING SO SLOWLY THAT OTHER PEOPLE COULD HAVE NOTICED. OR THE OPPOSITE, BEING SO FIGETY OR RESTLESS THAT YOU HAVE BEEN MOVING AROUND A LOT MORE THAN USUAL: NOT AT ALL
3. TROUBLE FALLING OR STAYING ASLEEP: MORE THAN HALF THE DAYS
SUM OF ALL RESPONSES TO PHQ QUESTIONS 1-9: 2
4. FEELING TIRED OR HAVING LITTLE ENERGY: NOT AT ALL
SUM OF ALL RESPONSES TO PHQ QUESTIONS 1-9: 2

## 2025-03-06 NOTE — CARDIO/PULMONARY
[facility] Cardiac Rehabilitation Initial Evaluation    Kojo Gracia       1965     [id number]    Share medical information:  Yes     Radha [phone]     Cardiac History    CABG    Physical Assessment     General Appearance   Height:  175.3 cm (5' 9\")  Weight:  118.3 kg (260 lb 12.8 oz)   BMI:  38.6  Skin color:         Cardiovascular Assessment  BP Sitting:  (!) 142/72  Sittin/70 (right arm)  Standin/76 (right arm)  Heart rate:   87 Monitor   Heart sounds: Exceptions to WDL Regular S1, S2    Respiratory Assessment  Resp rate: 18                  SpO2:     Quality/Effort:      Sleep Apnea:  No  CPAP  No  Oxygen  No     Sleeping Habits:  Sleeps average of 7 hours a night     Edema:  No      Orthopedic/Exercise Limitations:  No    Pain:   Do you have pain?:  no       Fall Risk Assessment     History of falling with or without injury: No  Use of ambulatory aid: No  Difficulty walking/impaired gait: No  Numbness in feet: No (right leg from surgery)  Vision changes: No  Dizziness: No  Shortness of breath: No  Current medications include but not limited to: ACE, ARB, Beta  Blocker, Anticoagulant  Other fall risk : No  Outpatient fall risk intervention strategies: Other (comment)    Abuse / Neglect  Physical/behavioral signs of abuse/neglect   No    Do you feel safe at home   Yes    Advanced Directives  Patient has Advanced Directives:  No  Patient given Advanced Directive pack:  No    Vaccinations  Influenza (annual):  No  Pneumonia:  No    Pt here for first session of Cardiac Rehab.Reviewed and discussed insurance benefits, pt v/u.  Reviewed Cardiac Rehab Routine and RPE scale, pt v/u.  Developed individual treatment plan and goals set with patient; pt in agreement with plan and no further questions at this time.  Performed six minute walk test.  Next visit scheduled for  Friday 3/7/2025 at 9 am.    LIZETTE AMAYA, RN  3/6/2025

## 2025-03-07 ENCOUNTER — HOSPITAL ENCOUNTER (OUTPATIENT)
Dept: CARDIAC REHAB | Age: 60
Setting detail: THERAPIES SERIES
Discharge: HOME | End: 2025-03-07
Payer: COMMERCIAL

## 2025-03-07 PROCEDURE — 93798 PHYS/QHP OP CAR RHAB W/ECG: CPT

## 2025-03-10 ENCOUNTER — HOSPITAL ENCOUNTER (OUTPATIENT)
Dept: CARDIAC REHAB | Age: 60
Setting detail: THERAPIES SERIES
Discharge: HOME | End: 2025-03-10
Payer: COMMERCIAL

## 2025-03-10 PROCEDURE — 93798 PHYS/QHP OP CAR RHAB W/ECG: CPT

## 2025-03-12 ENCOUNTER — HOSPITAL ENCOUNTER (OUTPATIENT)
Dept: CARDIAC REHAB | Age: 60
Setting detail: THERAPIES SERIES
Discharge: HOME | End: 2025-03-12
Payer: COMMERCIAL

## 2025-03-12 PROCEDURE — 93798 PHYS/QHP OP CAR RHAB W/ECG: CPT

## 2025-03-14 ENCOUNTER — HOSPITAL ENCOUNTER (OUTPATIENT)
Dept: CARDIAC REHAB | Age: 60
Setting detail: THERAPIES SERIES
Discharge: HOME | End: 2025-03-14
Payer: COMMERCIAL

## 2025-03-14 PROCEDURE — 93798 PHYS/QHP OP CAR RHAB W/ECG: CPT

## 2025-03-17 ENCOUNTER — HOSPITAL ENCOUNTER (OUTPATIENT)
Dept: CARDIAC REHAB | Age: 60
Setting detail: THERAPIES SERIES
Discharge: HOME | End: 2025-03-17
Payer: COMMERCIAL

## 2025-03-17 PROCEDURE — 93798 PHYS/QHP OP CAR RHAB W/ECG: CPT

## 2025-03-19 ENCOUNTER — HOSPITAL ENCOUNTER (OUTPATIENT)
Dept: CARDIAC REHAB | Age: 60
Setting detail: THERAPIES SERIES
Discharge: HOME | End: 2025-03-19
Payer: COMMERCIAL

## 2025-03-19 PROCEDURE — 93798 PHYS/QHP OP CAR RHAB W/ECG: CPT

## 2025-03-21 ENCOUNTER — HOSPITAL ENCOUNTER (OUTPATIENT)
Dept: CARDIAC REHAB | Age: 60
Setting detail: THERAPIES SERIES
Discharge: HOME | End: 2025-03-21
Payer: COMMERCIAL

## 2025-03-21 ENCOUNTER — TELEPHONE (OUTPATIENT)
Dept: CARDIOLOGY CLINIC | Age: 60
End: 2025-03-21

## 2025-03-21 PROCEDURE — 93798 PHYS/QHP OP CAR RHAB W/ECG: CPT

## 2025-03-21 NOTE — TELEPHONE ENCOUNTER
Patient sent this [employer] message:      Appointment Request From: Kojo Gracia     With Provider: Dr. Asad Christianson MD [[facility]]     Preferred Date Range: Any     Preferred Times: Any Time     Reason for visit: Request an Appointment     Health Maintenance Topic:      Comments:  Feeling a popping sound in my upper chest.         Please call patient and advise.

## 2025-03-21 NOTE — TELEPHONE ENCOUNTER
Attempted to call Kojo back. No answer. LMOM with callback number. Instructed him to call CT surgery office as well.

## 2025-03-24 ENCOUNTER — HOSPITAL ENCOUNTER (OUTPATIENT)
Dept: CARDIAC REHAB | Age: 60
Setting detail: THERAPIES SERIES
Discharge: HOME | End: 2025-03-24
Payer: COMMERCIAL

## 2025-03-24 ENCOUNTER — TELEPHONE (OUTPATIENT)
Dept: CARDIOLOGY CLINIC | Age: 60
End: 2025-03-24

## 2025-03-24 PROCEDURE — 93798 PHYS/QHP OP CAR RHAB W/ECG: CPT

## 2025-03-24 RX ORDER — METOPROLOL SUCCINATE 25 MG/1
12.5 TABLET, EXTENDED RELEASE ORAL 2 TIMES DAILY
Qty: 90 TABLET | Refills: 2 | Status: SHIPPED | OUTPATIENT
Start: 2025-03-24

## 2025-03-24 RX ORDER — ROSUVASTATIN CALCIUM 20 MG/1
20 TABLET, COATED ORAL DAILY
Qty: 90 TABLET | Refills: 2 | Status: SHIPPED | OUTPATIENT
Start: 2025-03-24

## 2025-03-24 RX ORDER — CLOPIDOGREL BISULFATE 75 MG/1
75 TABLET ORAL DAILY
Qty: 90 TABLET | Refills: 2 | Status: SHIPPED | OUTPATIENT
Start: 2025-03-24

## 2025-03-24 NOTE — TELEPHONE ENCOUNTER
Colby from [facility] is calling in regards to the script received for rosuvastatin (CRESTOR) 20 MG tablet He says that the pt's PCP prescribes Lipitor. Asking if the pt is no longer to take the Lipitor.    Please call Colby to discuss [phone]

## 2025-03-24 NOTE — TELEPHONE ENCOUNTER
Spoke to patient's pharmacy about discontinuing the lipitor and patient starting crestor. Pharmacy verbalized understanding.

## 2025-03-24 NOTE — TELEPHONE ENCOUNTER
Called and left message for pt to call back. Symptoms unclear from message. DCE not in office for several weeks. Patient can see NPTS this week for evaluation

## 2025-03-26 ENCOUNTER — HOSPITAL ENCOUNTER (OUTPATIENT)
Dept: CARDIAC REHAB | Age: 60
Setting detail: THERAPIES SERIES
Discharge: HOME | End: 2025-03-26
Payer: COMMERCIAL

## 2025-03-26 PROCEDURE — 93798 PHYS/QHP OP CAR RHAB W/ECG: CPT

## 2025-03-26 NOTE — TELEPHONE ENCOUNTER
Pt called to return huan Rodriguez call.  Pt states he is having cracking (popping) in his neck up around his chest.  Please call to discuss his concerns.  Thank you

## 2025-03-28 ENCOUNTER — HOSPITAL ENCOUNTER (OUTPATIENT)
Dept: CARDIAC REHAB | Age: 60
Setting detail: THERAPIES SERIES
Discharge: HOME | End: 2025-03-28
Payer: COMMERCIAL

## 2025-03-28 ENCOUNTER — OFFICE VISIT (OUTPATIENT)
Dept: CARDIOLOGY CLINIC | Age: 60
End: 2025-03-28

## 2025-03-28 VITALS
HEART RATE: 84 BPM | DIASTOLIC BLOOD PRESSURE: 62 MMHG | HEIGHT: 69 IN | BODY MASS INDEX: 37.92 KG/M2 | WEIGHT: 256 LBS | OXYGEN SATURATION: 98 % | SYSTOLIC BLOOD PRESSURE: 120 MMHG

## 2025-03-28 DIAGNOSIS — I48.0 PAROXYSMAL ATRIAL FIBRILLATION (HCC): ICD-10-CM

## 2025-03-28 DIAGNOSIS — Q23.81 AORTIC STENOSIS DUE TO BICUSPID AORTIC VALVE: ICD-10-CM

## 2025-03-28 DIAGNOSIS — I25.10 CORONARY ARTERY DISEASE INVOLVING NATIVE CORONARY ARTERY OF NATIVE HEART WITHOUT ANGINA PECTORIS: Primary | ICD-10-CM

## 2025-03-28 DIAGNOSIS — Q23.0 AORTIC STENOSIS DUE TO BICUSPID AORTIC VALVE: ICD-10-CM

## 2025-03-28 DIAGNOSIS — I10 PRIMARY HYPERTENSION: ICD-10-CM

## 2025-03-28 DIAGNOSIS — E78.2 MIXED HYPERLIPIDEMIA: ICD-10-CM

## 2025-03-28 DIAGNOSIS — I77.810 ASCENDING AORTA DILATATION: ICD-10-CM

## 2025-03-28 PROCEDURE — 93798 PHYS/QHP OP CAR RHAB W/ECG: CPT

## 2025-03-28 NOTE — PROGRESS NOTES
[facility]     Outpatient Follow Up Note    Kojo Gracia is 60 y.o. male who presents today with a history of AS/ascending thoracic aneurysm/CAD with CANDACE clip Jan '25; PAF, HTN and hyperlipidemia     CHIEF COMPLAINT / HPI:  Follow Up secondary to feeling a popping in his chest    Subjective:   He's been hearing a pop in his upper sternum, ie when making the bed, showering, rasing his arms above his head can cause it. It doesn't happen all the time.     He gets a little pressure mid chest after exercising or after bending over.  He no longer smokes. He coughs at times and it hurts in his upper chest. He followed his lifting restrictions as set by CVTS     He denies significant chest pain. There is no SOB/VU. The patient denies orthopnea/PND. The patient does not have swelling. The patients weight is stable . The patient is not experiencing palpitations or dizziness.   He feels good and going to cardiac rehab which ends soon    The rash he had suspected to had been from atorvastatin went away and he started on rosuvastatin . He's starting to seen the same red bumps on his chest / itch.    These symptoms are new since the last OV.   With regard to medication therapy the patient has been compliant with prescribed regimen. They have tolerated therapy to date.     Past Medical History:   Diagnosis Date    Fatty liver     Hyperlipidemia     Hypertension     Pancreatitis      Social History:    Social History     Tobacco Use   Smoking Status Former    Current packs/day: 1.00    Average packs/day: 1 pack/day for 46.0 years (46.0 ttl pk-yrs)    Types: Cigarettes   Smokeless Tobacco Never   Tobacco Comments    16 cigarettes daily     Current Medications:  Current Outpatient Medications   Medication Sig Dispense Refill    rosuvastatin (CRESTOR) 20 MG tablet Take 1 tablet by mouth daily 90 tablet 2    metoprolol succinate (TOPROL XL) 25 MG extended release tablet Take 0.5 tablets by mouth in the morning and at

## 2025-03-31 ENCOUNTER — HOSPITAL ENCOUNTER (OUTPATIENT)
Dept: CARDIAC REHAB | Age: 60
Setting detail: THERAPIES SERIES
Discharge: HOME | End: 2025-03-31
Payer: COMMERCIAL

## 2025-03-31 PROCEDURE — 93798 PHYS/QHP OP CAR RHAB W/ECG: CPT

## 2025-04-01 ENCOUNTER — HOSPITAL ENCOUNTER (OUTPATIENT)
Dept: GENERAL RADIOLOGY | Age: 60
Discharge: HOME | End: 2025-04-01
Payer: COMMERCIAL

## 2025-04-01 ENCOUNTER — TELEPHONE (OUTPATIENT)
Age: 60
End: 2025-04-01

## 2025-04-01 ENCOUNTER — HOSPITAL ENCOUNTER (OUTPATIENT)
Age: 60
Discharge: HOME | End: 2025-04-01
Payer: COMMERCIAL

## 2025-04-01 DIAGNOSIS — R07.89 STERNAL PAIN: ICD-10-CM

## 2025-04-01 DIAGNOSIS — R07.89 STERNAL PAIN: Primary | ICD-10-CM

## 2025-04-01 PROCEDURE — 71046 X-RAY EXAM CHEST 2 VIEWS: CPT

## 2025-04-01 NOTE — TELEPHONE ENCOUNTER
Pt is s/p CABG on 1/16/25. Pt called to report that he is experiencing popping and clicking in his upper chest. It occurs with bending and movement. The pain varies from mild to significant depending on movement. No associated shortness of breath or dizziness. Spoke with GIO Coleman PA-C. Pt instructed to get a CXR to assess for sternal non union. Verbalizes understanding and agrees with plan. Order placed into epic.

## 2025-04-04 ENCOUNTER — TELEPHONE (OUTPATIENT)
Age: 60
End: 2025-04-04

## 2025-04-04 NOTE — TELEPHONE ENCOUNTER
CXR reviewed by Dr. Waddell and GIO Coleman PA-C. No evidence for sternal dehiscence/non-union. Pt notified. States his pain, located in his mid chest, remains 8-9/10. States pain occurs with changes in positioning side to side, with bending over and showering and is described as a stabbing pain. It intensifies to intolerable with coughing or sneezing. Spoke with GIO Coleman PA-C. Pt sched to see her on 4/8/25. Pt aware and agreeable with plan.

## 2025-04-07 RX ORDER — METOPROLOL SUCCINATE 25 MG/1
25 TABLET, EXTENDED RELEASE ORAL NIGHTLY
Qty: 30 TABLET | OUTPATIENT
Start: 2025-04-07

## 2025-04-07 RX ORDER — CLOPIDOGREL BISULFATE 75 MG/1
75 TABLET ORAL DAILY
Qty: 30 TABLET | OUTPATIENT
Start: 2025-04-07

## 2025-04-07 RX ORDER — PANTOPRAZOLE SODIUM 40 MG/1
40 TABLET, DELAYED RELEASE ORAL DAILY
Qty: 30 TABLET | Refills: 3 | OUTPATIENT
Start: 2025-04-07

## 2025-04-07 RX ORDER — ASPIRIN 81 MG/1
81 TABLET, CHEWABLE ORAL DAILY
Qty: 30 TABLET | OUTPATIENT
Start: 2025-04-07

## 2025-04-11 ENCOUNTER — OFFICE VISIT (OUTPATIENT)
Age: 60
End: 2025-04-11

## 2025-04-11 VITALS
HEIGHT: 69 IN | TEMPERATURE: 97.9 F | BODY MASS INDEX: 37.43 KG/M2 | HEART RATE: 76 BPM | SYSTOLIC BLOOD PRESSURE: 120 MMHG | OXYGEN SATURATION: 97 % | DIASTOLIC BLOOD PRESSURE: 60 MMHG | WEIGHT: 252.7 LBS

## 2025-04-11 DIAGNOSIS — Z95.1 S/P CABG X 4: Primary | ICD-10-CM

## 2025-04-11 DIAGNOSIS — Z95.828 S/P ASCENDING AORTIC REPLACEMENT: ICD-10-CM

## 2025-04-11 PROCEDURE — 99024 POSTOP FOLLOW-UP VISIT: CPT

## 2025-04-11 NOTE — PROGRESS NOTES
rosuvastatin (CRESTOR) 20 MG tablet Take 1 tablet by mouth daily 90 tablet 2    metoprolol succinate (TOPROL XL) 25 MG extended release tablet Take 0.5 tablets by mouth in the morning and at bedtime 90 tablet 2    clopidogrel (PLAVIX) 75 MG tablet Take 1 tablet by mouth daily 90 tablet 2    aspirin 81 MG EC tablet Take 1 tablet by mouth daily 30 tablet 3    pantoprazole (PROTONIX) 40 MG tablet Take 1 tablet by mouth daily 30 tablet 3     No current facility-administered medications for this visit.       Assessment / Plan:     S/p CABG x4/ AVR ((25mm Epic Max) / ascending aorta aneurysm replacement (34mm x 10 mm Hemashield) / CANDACE clip    Sternal Pain: CXR reviewed; advise extra strength tylenol q 8 hrs during the day; lidocaine patches as needed; pt states he does not feel like he needs any narcotics and discomfort does not appear to be nerve related. Pt agrees and understands plan, explained this to be expected during this time in recovery    HLD: pt taking Crestor 20 mg daily     Patient taking Plavix for 3 months s/p sx (~4/22/25)    Continue to follow up with PCP/ Cardiology           Electronically signed by Aditya Coleman PA-C on 4/11/2025 at 3:22 PM

## 2025-04-11 NOTE — PROGRESS NOTES
[facility]   Electrophysiology  VEENA Patterson-CNP  Attending EP: Dr. Galeas    Date: 5/9/2025  I had the privilege of visiting Kojo Gracia in the office.     Chief Complaint:   Chief Complaint   Patient presents with    Atrial Fibrillation     No cardiac symptoms at this time.     History of Present Illness: History obtained from patient and medical record.    Kojo Gracia is 60 y.o. male with a past medical history of ascending AAA, aortic stenosis, PAF, and obesity. S/p aortic valve replacement with ascending aorta graft replacement with ligament of Franky and CANDACE ligation (1/16/25)    -Interval history: Today, Kojo Gracia is being seen for routine follow up. He is doing well and feels much better. He denies any known recurrence of atrial fibrillation. He was very symptomatic with AF prior to CABG. We discussed his CANDACE ligation. He finished cardiac rehab and is now back working. He still has pain when he coughs too hard. He has successfully stopped smoking.     Denies having chest pain, palpitations, shortness of breath, orthopnea/PND, cough, or dizziness at the time of this visit.    With regard to medication therapy the patient has been compliant with prescribed regimen. They have tolerated therapy to date.     Assessment&Plan:    1.Paroxysmal Atrial Fibrillation  - S/p Ligament of Franky and CANDACE ligation (1/16/25)    - Currently in NSR  - Continue Toprol XL 12.5 mg BID  - [id number] score:2 (CAD/HTN); [id number] Vasc score and anticoagulation discussed.                ~ He is currently on DAPT. Discussed need to assess CANDACE ligation to ensure he does not need anti-coagulation long term     - Afib risk factors including age, HTN, obesity, inactivity and SID were discussed with patient. Risk factor modification recommended               ~ TSH normal (1/2025)               ~ Needs outpatient sleep study       - S/p CANDACE ligation with surgery in January. Discussed need for PAOLO to

## 2025-05-01 ENCOUNTER — TELEPHONE (OUTPATIENT)
Dept: CARDIOLOGY CLINIC | Age: 60
End: 2025-05-01

## 2025-05-01 NOTE — TELEPHONE ENCOUNTER
Medication Refill    Medication needing refilled:  rosuvastatin   metoprolol succinate   clopidogrel     Dosage of the medication:   20mg  25mg  75mg    How are you taking this medication (QD, BID, TID, QID, PRN):    Take 1 tablet by mouth daily    Take 1 tablet by mouth daily   Take 1 tablet by mouth daily     30 or 90 day supply called in:   90 90  90    When will you run out of your medication:    Which Pharmacy are we sending the medication to?:     [facility]  [address] [place] [id number]  Phone: [phone]  Fax: [phone]

## 2025-05-02 NOTE — TELEPHONE ENCOUNTER
I called and spoke to [facility] associate. I told him NPTS recently gave refiils on all 3 meds  rosuvastatin , metoprolol succinate , clopidogrel on 3/24/25 w/90 tabs and 2 refills. He v/u and will call us back if he need further assistance.

## 2025-05-09 ENCOUNTER — OFFICE VISIT (OUTPATIENT)
Dept: CARDIOLOGY CLINIC | Age: 60
End: 2025-05-09
Payer: COMMERCIAL

## 2025-05-09 ENCOUNTER — TELEPHONE (OUTPATIENT)
Dept: CARDIOLOGY CLINIC | Age: 60
End: 2025-05-09

## 2025-05-09 VITALS
BODY MASS INDEX: 37.47 KG/M2 | WEIGHT: 253 LBS | HEART RATE: 74 BPM | SYSTOLIC BLOOD PRESSURE: 124 MMHG | HEIGHT: 69 IN | DIASTOLIC BLOOD PRESSURE: 72 MMHG | OXYGEN SATURATION: 96 %

## 2025-05-09 DIAGNOSIS — Z15.2 CLASS 3 OBESITY DUE TO DISRUPTION OF MC4R PATHWAY WITH SERIOUS COMORBIDITY AND BODY MASS INDEX (BMI) OF 40.0 TO 44.9 IN ADULT (HCC): ICD-10-CM

## 2025-05-09 DIAGNOSIS — E88.82 CLASS 3 OBESITY DUE TO DISRUPTION OF MC4R PATHWAY WITH SERIOUS COMORBIDITY AND BODY MASS INDEX (BMI) OF 40.0 TO 44.9 IN ADULT (HCC): ICD-10-CM

## 2025-05-09 DIAGNOSIS — R00.1 BRADYCARDIA: ICD-10-CM

## 2025-05-09 DIAGNOSIS — Z95.828 S/P ASCENDING AORTIC REPLACEMENT: ICD-10-CM

## 2025-05-09 DIAGNOSIS — I48.0 PAF (PAROXYSMAL ATRIAL FIBRILLATION) (HCC): Primary | ICD-10-CM

## 2025-05-09 DIAGNOSIS — E66.813 CLASS 3 OBESITY DUE TO DISRUPTION OF MC4R PATHWAY WITH SERIOUS COMORBIDITY AND BODY MASS INDEX (BMI) OF 40.0 TO 44.9 IN ADULT (HCC): ICD-10-CM

## 2025-05-09 DIAGNOSIS — I48.0 PAROXYSMAL ATRIAL FIBRILLATION (HCC): ICD-10-CM

## 2025-05-09 PROCEDURE — 93000 ELECTROCARDIOGRAM COMPLETE: CPT | Performed by: NURSE PRACTITIONER

## 2025-05-09 PROCEDURE — 99214 OFFICE O/P EST MOD 30 MIN: CPT | Performed by: NURSE PRACTITIONER

## 2025-05-09 NOTE — TELEPHONE ENCOUNTER
You are scheduled for a PAOLO    Our  will call you to discuss a date for your procedure.    The day of your procedure you will need to check in at the Registration Desk in the Main Lobby.    PRE-PROCEDURE INSTRUCTIONS   Do not eat or drink anything after midnight the night before your procedure.   Take all your medications with a sip of water in the morning.   Ok to hold all meds   Please have a responsible adult to drive you home after your procedure.    If you have any questions regarding your procedure itself or your medications, please call [phone] and ask to speak to an EP nurse.

## 2025-05-09 NOTE — PATIENT INSTRUCTIONS
You are scheduled for a PAOLO  Our  will call you to discuss a date for your procedure.  The day of your procedure you will need to check in at the Registration Desk in the Main Lobby.    PRE-PROCEDURE INSTRUCTIONS   Do not eat or drink anything after midnight the night before your procedure.   Take all your medications with a sip of water in the morning.   Ok to take all meds   Please have a responsible adult to drive you home after your procedure.    If you have any questions regarding your procedure itself or your medications, please call [phone] and ask to speak to an EP nurse.

## 2025-05-20 NOTE — TELEPHONE ENCOUNTER
Procedure: PAOLO    Date Of Procedure:  6/20/25    Time Of Arrival: 7AM    Procedure Time: 8AM       Called and spoke to patient and he is agreeable to the date and time. Reviewed the Pre-Procedure instructions and they verbalized understanding. Encouraged to call with any questions or concerns.       Published on [employer] / emailed to Cath lab / note in chart / scheduled in Epic/Cupid/Carto

## 2025-06-20 ENCOUNTER — HOSPITAL ENCOUNTER (OUTPATIENT)
Age: 60
Discharge: HOME | End: 2025-06-22
Payer: COMMERCIAL

## 2025-06-20 VITALS
HEIGHT: 69 IN | HEART RATE: 56 BPM | DIASTOLIC BLOOD PRESSURE: 73 MMHG | OXYGEN SATURATION: 96 % | WEIGHT: 252 LBS | BODY MASS INDEX: 37.33 KG/M2 | RESPIRATION RATE: 14 BRPM | SYSTOLIC BLOOD PRESSURE: 139 MMHG

## 2025-06-20 DIAGNOSIS — I48.0 PAROXYSMAL ATRIAL FIBRILLATION (HCC): ICD-10-CM

## 2025-06-20 LAB — ECHO BSA: 2.36 M2

## 2025-06-20 PROCEDURE — 93312 ECHO TRANSESOPHAGEAL: CPT | Performed by: INTERNAL MEDICINE

## 2025-06-20 PROCEDURE — 7100000011 HC PHASE II RECOVERY - ADDTL 15 MIN: Performed by: INTERNAL MEDICINE

## 2025-06-20 PROCEDURE — 6360000002 HC RX W HCPCS: Performed by: INTERNAL MEDICINE

## 2025-06-20 PROCEDURE — 99152 MOD SED SAME PHYS/QHP 5/>YRS: CPT | Performed by: INTERNAL MEDICINE

## 2025-06-20 PROCEDURE — 93325 DOPPLER ECHO COLOR FLOW MAPG: CPT | Performed by: INTERNAL MEDICINE

## 2025-06-20 PROCEDURE — 7100000010 HC PHASE II RECOVERY - FIRST 15 MIN: Performed by: INTERNAL MEDICINE

## 2025-06-20 PROCEDURE — 93312 ECHO TRANSESOPHAGEAL: CPT

## 2025-06-20 PROCEDURE — 99153 MOD SED SAME PHYS/QHP EA: CPT | Performed by: INTERNAL MEDICINE

## 2025-06-20 PROCEDURE — 93320 DOPPLER ECHO COMPLETE: CPT | Performed by: INTERNAL MEDICINE

## 2025-06-20 PROCEDURE — 6370000000 HC RX 637 (ALT 250 FOR IP): Performed by: INTERNAL MEDICINE

## 2025-06-20 RX ORDER — CARVEDILOL 6.25 MG/1
6.25 TABLET ORAL 2 TIMES DAILY
Qty: 180 TABLET | Refills: 3 | Status: SHIPPED | OUTPATIENT
Start: 2025-06-20

## 2025-06-20 RX ORDER — CARVEDILOL 6.25 MG/1
6.25 TABLET ORAL 2 TIMES DAILY
Qty: 180 TABLET | Refills: 3 | Status: SHIPPED | OUTPATIENT
Start: 2025-06-20 | End: 2025-06-20

## 2025-06-20 RX ORDER — FENTANYL CITRATE 50 UG/ML
INJECTION, SOLUTION INTRAMUSCULAR; INTRAVENOUS PRN
Status: COMPLETED | OUTPATIENT
Start: 2025-06-20 | End: 2025-06-20

## 2025-06-20 RX ORDER — MIDAZOLAM HYDROCHLORIDE 1 MG/ML
INJECTION, SOLUTION INTRAMUSCULAR; INTRAVENOUS PRN
Status: COMPLETED | OUTPATIENT
Start: 2025-06-20 | End: 2025-06-20

## 2025-06-20 RX ORDER — LIDOCAINE HYDROCHLORIDE 20 MG/ML
SOLUTION OROPHARYNGEAL PRN
Status: COMPLETED | OUTPATIENT
Start: 2025-06-20 | End: 2025-06-20

## 2025-06-20 RX ADMIN — MIDAZOLAM 2 MG: 1 INJECTION INTRAMUSCULAR; INTRAVENOUS at 07:54

## 2025-06-20 RX ADMIN — MIDAZOLAM 2 MG: 1 INJECTION INTRAMUSCULAR; INTRAVENOUS at 07:52

## 2025-06-20 RX ADMIN — FENTANYL CITRATE 100 MCG: 50 INJECTION, SOLUTION INTRAMUSCULAR; INTRAVENOUS at 07:52

## 2025-06-20 RX ADMIN — LIDOCAINE HYDROCHLORIDE 15 ML: 20 SOLUTION ORAL; TOPICAL at 07:41

## 2025-06-20 RX ADMIN — MIDAZOLAM 1 MG: 1 INJECTION INTRAMUSCULAR; INTRAVENOUS at 08:01

## 2025-06-20 NOTE — H&P
[facility]   Electrophysiology    Date: 6/20/2025  I had the privilege of visiting Kojo Gracia in the office.     Chief Complaint:   Here for post CANDACE ligation PAOLO.    History of Present Illness: History obtained from patient and medical record.    Kojo Gracia is 60 y.o. male with a past medical history of ascending AAA, aortic stenosis, PAF, and obesity. S/p aortic valve replacement with ascending aorta graft replacement with ligament of Franky and CANDACE ligation (1/16/25)    -Interval history: Today, Kojo Gracia is being seen for routine follow up. He is doing well and feels much better. He denies any known recurrence of atrial fibrillation. He was very symptomatic with AF prior to CABG. We discussed his CANDACE ligation. He finished cardiac rehab and is now back working. He still has pain when he coughs too hard. He has successfully stopped smoking.     Denies having chest pain, palpitations, shortness of breath, orthopnea/PND, cough, or dizziness at the time of this visit.    With regard to medication therapy the patient has been compliant with prescribed regimen. They have tolerated therapy to date.     Assessment&Plan:    1.Paroxysmal Atrial Fibrillation  - S/p Ligament of Franky and CANDACE ligation (1/16/25)    - Currently in NSR  - Continue Toprol XL 12.5 mg BID  - [id number] score:2 (CAD/HTN); [id number] Vasc score and anticoagulation discussed.                ~ He is currently on DAPT. Discussed need to assess CANDACE ligation to ensure he does not need anti-coagulation long term     - Afib risk factors including age, HTN, obesity, inactivity and SID were discussed with patient. Risk factor modification recommended               ~ TSH normal (1/2025)               ~ Needs outpatient sleep study       - S/p CANDACE ligation with surgery in January. Discussed need for PAOLO to verify adequate ligation of CANDACE. Risks, benefits, and alternatives discussed. He agrees to proceed. If he has recurrent AF

## 2025-06-20 NOTE — DISCHARGE INSTRUCTIONS
PAOLO DISCHARGE INSTRUCTIONS    No driving for 24 hours. We strongly recommend that a responsible adult stay with you for the next 24 hours.    Continue Medications.    Advance diet as tolerated    Contact physician office  for following symptoms:  Fever  Difficulty swallowing  Chest pain  Difficulty breathing  Bleeding

## 2025-07-10 ENCOUNTER — TELEPHONE (OUTPATIENT)
Dept: CARDIOLOGY CLINIC | Age: 60
End: 2025-07-10

## 2025-07-10 RX ORDER — AMLODIPINE BESYLATE 5 MG/1
5 TABLET ORAL DAILY
Qty: 90 TABLET | Refills: 3 | Status: SHIPPED | OUTPATIENT
Start: 2025-07-10

## 2025-07-10 NOTE — TELEPHONE ENCOUNTER
Discussed with GERARDO. Add norvasc 5mg daily. Spoke with rae. Take amlodipine when he picks it up today. Confirmed pharmacy. Patient will continue to monitor BP and call tomorrow if he is still symptomatic with dizziness.  EARL

## 2025-07-10 NOTE — TELEPHONE ENCOUNTER
Tita, RN from patient's job called stating patient has been dizzy this AM since 4:30.  Patient has recently had a PAOLO and had medication changes.     Denies CP or SOB.      BP-  172/96  at work  HR-  81  PULSE OX-  97%    Please call Tita back at [phone].

## 2025-08-14 ENCOUNTER — OFFICE VISIT (OUTPATIENT)
Dept: CARDIOLOGY CLINIC | Age: 60
End: 2025-08-14
Payer: COMMERCIAL

## 2025-08-14 VITALS
DIASTOLIC BLOOD PRESSURE: 80 MMHG | HEART RATE: 81 BPM | HEIGHT: 69 IN | SYSTOLIC BLOOD PRESSURE: 140 MMHG | WEIGHT: 254 LBS | OXYGEN SATURATION: 94 % | BODY MASS INDEX: 37.62 KG/M2

## 2025-08-14 DIAGNOSIS — Z95.1 HX OF CABG: ICD-10-CM

## 2025-08-14 DIAGNOSIS — I77.810 ASCENDING AORTA DILATATION: ICD-10-CM

## 2025-08-14 DIAGNOSIS — I25.10 CORONARY ARTERY DISEASE DUE TO LIPID RICH PLAQUE: Primary | ICD-10-CM

## 2025-08-14 DIAGNOSIS — Z95.828 S/P ASCENDING AORTIC REPLACEMENT: ICD-10-CM

## 2025-08-14 DIAGNOSIS — Z95.2 S/P AVR (AORTIC VALVE REPLACEMENT): ICD-10-CM

## 2025-08-14 DIAGNOSIS — I35.0 AORTIC VALVE STENOSIS, NONRHEUMATIC: ICD-10-CM

## 2025-08-14 DIAGNOSIS — I48.0 PAF (PAROXYSMAL ATRIAL FIBRILLATION) (HCC): ICD-10-CM

## 2025-08-14 DIAGNOSIS — I25.83 CORONARY ARTERY DISEASE DUE TO LIPID RICH PLAQUE: Primary | ICD-10-CM

## 2025-08-14 PROCEDURE — 99214 OFFICE O/P EST MOD 30 MIN: CPT | Performed by: INTERNAL MEDICINE

## 2025-08-14 RX ORDER — CARVEDILOL 12.5 MG/1
12.5 TABLET ORAL 2 TIMES DAILY
Qty: 180 TABLET | Refills: 3 | Status: SHIPPED | OUTPATIENT
Start: 2025-08-14

## 2025-08-29 ENCOUNTER — TELEPHONE (OUTPATIENT)
Dept: CARDIOLOGY CLINIC | Age: 60
End: 2025-08-29

## (undated) DEVICE — CANNULA VENT 16FR MAL INTRO SIL CONN 0.25IN NVENT BULL TIP

## (undated) DEVICE — ADAPTER PERF L7.5IN INLET LEG 3IN Y TYP VENT CLR CODE CLMP

## (undated) DEVICE — MARKER GRAFT CORONARY BYPASS DISTAL S/S DISP

## (undated) DEVICE — SUMP INTCARD SUCT AD 20FR PERICARD MAYO STYL FLX VERSATILE

## (undated) DEVICE — ROTATING SURGICAL PUNCHES, 1 PER POUCH: Brand: A&E MEDICAL / ROTATING SURGICAL PUNCHES

## (undated) DEVICE — INDICATED FOR SURGICAL CLAMPING DURING CARDIOVASCULAR PERIPHERAL VASCULAR, AND GENERAL SURGERY.: Brand: SOFT/FIBRA® SPRING CLIP

## (undated) DEVICE — STERNUM BLADE, OFFSET (31.7 X 0.64 X 6.3MM)

## (undated) DEVICE — TIP SUCT FRAZ 8FR ST W VENT

## (undated) DEVICE — DRESSING STERILE PETRO W3XL8IN N ADH OIL EMUL GZ CURAD

## (undated) DEVICE — GLOVE SURG SZ 8 L11.77IN FNGR THK9.8MIL STRW LTX POLYMER

## (undated) DEVICE — SPONGE,LAP,4"X18",XR,ST,5/PK,40PK/CS: Brand: MEDLINE INDUSTRIES, INC.

## (undated) DEVICE — SUTURE PROL SZ 7-0 L24IN NONABSORBABLE BLU L8MM BV175-6 3/8 8735H

## (undated) DEVICE — GUIDEWIRE VASC L260CM DIA0.035IN RAD 3MM J TIP L7CM PTFE

## (undated) DEVICE — SUTURE ETHIBOND EXCEL SZ 2-0 L30IN PLEDG 7X3X1.5MM PXX41

## (undated) DEVICE — Device: Brand: RETRACT-O-TAPE 18G X 30.5CM BLUNT NEEDLE

## (undated) DEVICE — INTENDED FOR TISSUE SEPARATION, AND OTHER PROCEDURES THAT REQUIRE A SHARP SURGICAL BLADE TO PUNCTURE OR CUT.: Brand: BARD-PARKER ® STAINLESS STEEL BLADES

## (undated) DEVICE — CLAMP SURG ISOLATOR SYNERGY

## (undated) DEVICE — SUTURE VICRYL + SZ 0 L27IN ABSRB UD CT-1 L36MM 1/2 CIR TAPR VCP260H

## (undated) DEVICE — DRESSING WND SM W2.5XL4IN BRTH W/ CATH SECUREMENT AND

## (undated) DEVICE — Device: Brand: NOMOLINE™ LH ADULT NASAL CO2 CANNULA WITH O2 4M

## (undated) DEVICE — CANNULA PERFUSION 5.5IN 9FR AORTIC ROOT

## (undated) DEVICE — NEEDLE SUTURE FR SPRING EYE

## (undated) DEVICE — SHEET,DRAPE,40X58,STERILE: Brand: MEDLINE

## (undated) DEVICE — SUTURE PERMA-HAND SZ 4-0 L144IN NONABSORBABLE BLK LIGAPAK LA53G

## (undated) DEVICE — CATHETER DIAG AD 5FR L100CM COR GRY HYDRPHLC NYL MPA 2 W/ 2

## (undated) DEVICE — SUTURE NONABSORBABLE MONOFILAMENT 5-0 C-1 1X24 IN PROLENE 8725H

## (undated) DEVICE — SUTURE PROL SZ 4-0 L36IN NONABSORBABLE BLU L26MM SH 1/2 CIR 8521H

## (undated) DEVICE — PAD, DEFIB, ADULT, RADIOTRANS, PHYSIO: Brand: MEDLINE

## (undated) DEVICE — SPONGE,TONSIL,DBL STRNG,XRAY,SM,7/8",ST: Brand: MEDLINE INDUSTRIES, INC.

## (undated) DEVICE — DRAIN SURG SGL COLL PT TB FOR ATS BG OASIS

## (undated) DEVICE — TOWEL,OR,DSP,ST,WHITE,DLX,XR,4/PK,20PK/C: Brand: MEDLINE

## (undated) DEVICE — HANDPIECE SET WITH HIGH FLOW TIP AND SUCTION TUBE: Brand: INTERPULSE

## (undated) DEVICE — PERCUTANEOUS ENTRY THINWALL NEEDLE  ONE-PART: Brand: COOK

## (undated) DEVICE — LIQUIBAND RAPID ADHESIVE 36/CS 0.8ML: Brand: MEDLINE

## (undated) DEVICE — NDLCTR: FOAM/MAG 40CT 64/CS: Brand: MEDICAL ACTION INDUSTRIES

## (undated) DEVICE — TUBING, SUCTION, 1/4" X 10', STRAIGHT: Brand: MEDLINE

## (undated) DEVICE — KIT MICROINTRODUCER 4FR ECHOGENIC NDL L7CM 21GA STIFF COAX

## (undated) DEVICE — CONNECTOR PERF L5 IN OD1 2 IN SAT HCT ST FEATURING B CARE 5

## (undated) DEVICE — KIT COMPL CK0289R4  BB9L99R8

## (undated) DEVICE — TUBING INSUFFLATION SMK EVAC HI FLO SET PNEUMOCLEAR

## (undated) DEVICE — SUTURE PERMAHAND SZ 2-0 L30IN NONABSORBABLE BLK SH L26MM C016D

## (undated) DEVICE — SUTURE VICRYL + SZ 0 L18IN ABSRB UD L36MM CT-1 1/2 CIR VCP840D

## (undated) DEVICE — BLANKET THER PED W25XL33IN HYPER/HYPOTHERMIA DISP

## (undated) DEVICE — 3 ML SYRINGE LUER-LOCK TIP: Brand: MONOJECT

## (undated) DEVICE — CUSTOM PK 10A99R2 RED VENT

## (undated) DEVICE — PLEDGET SURG W0.375XL0.062IN THK1.5MM WHT SFT PTFE RECT

## (undated) DEVICE — GLIDESHEATH SLENDER STAINLESS STEEL KIT: Brand: GLIDESHEATH SLENDER

## (undated) DEVICE — CATHETER ANGIO 5FR L100CM GRY S STL NYL JL3.5 3 SEG BRAID L

## (undated) DEVICE — NEEDLE HYPO 18GA L1.5IN THN WALL PIVOTING SHLD BVL ORIENTED

## (undated) DEVICE — ADAPTER PK

## (undated) DEVICE — AUTOTRANSFUSION BOWL SET 225 CC XTRA

## (undated) DEVICE — KIT DIL 8/12/16/20/24FR NDL 18GA GWIRE L180CM DIA0.035IN

## (undated) DEVICE — SYRINGE, LUER LOCK, 10ML: Brand: MEDLINE

## (undated) DEVICE — BAG BLD TRNSF 1 CPLR IV ST 600ML TERUFLEX

## (undated) DEVICE — TRAY,CATHETER,SUCTION,14 FR,2 GLV,MINI: Brand: MEDLINE

## (undated) DEVICE — STABILIZER SURG TISS W/ CANSTR TBNG EVOLUTION OCTPS

## (undated) DEVICE — CATHETER THRMDIL 7.5FR L110CM PROXIMAL/DISTAL PRT L30CM STD

## (undated) DEVICE — SYRINGE 60ML BULB IRRIG ST LF

## (undated) DEVICE — CONTAINER STOR SURG SLUSH

## (undated) DEVICE — PRESSURE MONITORING SET: Brand: TRUWAVE, VAMP PLUS

## (undated) DEVICE — RETROGRADE CARDIOPLEGIA CATHETER: Brand: EDWARDS LIFESCIENCES RETROGRADE CARDIOPLEGIA CATHETER

## (undated) DEVICE — SYRINGE MED 5ML STD CLR PLAS LUERLOCK TIP N CTRL DISP

## (undated) DEVICE — SET PERF L15IN BLU CLMP MULT FEM LUER ON SGL INLET LEG DLP

## (undated) DEVICE — ACUMEN IQ SENSOR: Brand: ACUMEN IQ

## (undated) DEVICE — 3M™ STERI-DRAPE™ INSTRUMENT POUCH 1018: Brand: STERI-DRAPE™

## (undated) DEVICE — CANNULA ART 22FR OVERALL L12IN DIL TIP INTRO W GWIRE 0038IN

## (undated) DEVICE — EXTENSION SET, 2 INJECTION SITES, MALE LUER LOCK ADAPTER WITH RETRACTABLE COLLAR: Brand: INTERLINK

## (undated) DEVICE — BLADE ES ELASTOMERIC COAT INSUL DURABLE BEND UPTO 90DEG

## (undated) DEVICE — BLADE OPHTH 180DEG CUT SURF BLU STR SHRP DBL BVL GRINDLESS

## (undated) DEVICE — TR BAND RADIAL ARTERY COMPRESSION DEVICE: Brand: TR BAND

## (undated) DEVICE — KIT ART LN 20GA L12CM FEP RADPQ 0.025X13.75IN SPR GWIRE

## (undated) DEVICE — SET ADMIN PRIMING 67ML L105IN NVENT 180UM FLTR 3 RLER CLMP

## (undated) DEVICE — CATH LAB PACK: Brand: MEDLINE INDUSTRIES, INC.

## (undated) DEVICE — CANNULA PERF L2IN BLNT TIP 3MM VES CLR RADPQ BODY FEM LUER D

## (undated) DEVICE — GUIDE SURG TISS

## (undated) DEVICE — HYPODERMIC SAFETY NEEDLE: Brand: MAGELLAN

## (undated) DEVICE — RETRACTOR SURG INSRT SUT HLD OCTOBASE

## (undated) DEVICE — FILTER OXGNTR GAS BACT VIR REMOVAL W/ 1/4 INLET

## (undated) DEVICE — DRESSING CATHETER POS W BTRFLY TAPE PTCH ULT I SYS SORBAVIEW

## (undated) DEVICE — 1LYRTR 16FR10ML100%SILTMPS SNP: Brand: MEDLINE INDUSTRIES, INC.

## (undated) DEVICE — TEMP PACING WIRE: Brand: MYO/WIRE

## (undated) DEVICE — CABG: Brand: MEDLINE INDUSTRIES, INC.

## (undated) DEVICE — TRAY VEN ACCS DIA9FR 3 LUMN ADV HF DEV

## (undated) DEVICE — GEL US 20GM NONIRRITATING OVERWRAPPED FILE PCH TRNSMIT

## (undated) DEVICE — SUTURE PROL SZ 4-0 L36IN NONABSORBABLE BLU BB L17MM 3/8 CIR 8581H

## (undated) DEVICE — SET TRNQT 12FR L7.5IN BRNZ TB SNR TOURNIKWIK

## (undated) DEVICE — CANNULA PERF L72.5CM DIA7.7X8.3MM FEM VEN MINIMALLY

## (undated) DEVICE — SUTURE VICRYL + SZ 2-0 L36IN ABSRB UD L36MM CT-1 1/2 CIR VCP945H

## (undated) DEVICE — SUTURE VICRYL + SZ 0 L36IN ABSRB UD L48MM CTX 1/2 CIR VCP978H

## (undated) DEVICE — SUTURE VICRYL + SZ 3-0 L27IN ABSRB UD L26MM SH 1/2 CIR VCP416H

## (undated) DEVICE — KIT AT-X65 ANGIOTOUCH HAND CONTROLLER

## (undated) DEVICE — SYSTEM ENDOSCP VES HARV W/ TOOL CANN SEAL SHT PRT BLNT TIP

## (undated) DEVICE — CATHETER DIAG L100CM DIA5.2FR 0.038IN POLYUR COR JR4 STD

## (undated) DEVICE — SUTURE MONOCRYL + SZ 4-0 L27IN ABSRB UD L19MM PS-2 3/8 CIR MCP426H

## (undated) DEVICE — SUTURE PERMAHAND SZ 2-0 L30IN NONABSORBABLE BLK SILK W/O A305H

## (undated) DEVICE — DRAPE,ANGIO,BRACH,STERILE,38X44: Brand: MEDLINE

## (undated) DEVICE — CANNULA PERF 28FR L15IN VEN 3/8IN CONN SGL STG MAL 1 PC

## (undated) DEVICE — COVER US PRB W13XL244CM SURGICAL INTRAOPERATIVE W RUBBERBAND

## (undated) DEVICE — SPONGE LAP W18XL18IN WHT COT 4 PLY FLD STRUNG RADPQ DISP ST 2 PER PACK